# Patient Record
Sex: MALE | Race: OTHER | Employment: OTHER | URBAN - METROPOLITAN AREA
[De-identification: names, ages, dates, MRNs, and addresses within clinical notes are randomized per-mention and may not be internally consistent; named-entity substitution may affect disease eponyms.]

---

## 2017-03-20 ENCOUNTER — TELEPHONE (OUTPATIENT)
Dept: GASTROENTEROLOGY | Facility: CLINIC | Age: 67
End: 2017-03-20

## 2017-03-20 ENCOUNTER — OFFICE VISIT (OUTPATIENT)
Dept: GASTROENTEROLOGY | Facility: CLINIC | Age: 67
End: 2017-03-20

## 2017-03-20 VITALS
SYSTOLIC BLOOD PRESSURE: 150 MMHG | WEIGHT: 178.81 LBS | HEIGHT: 69 IN | BODY MASS INDEX: 26.48 KG/M2 | HEART RATE: 71 BPM | DIASTOLIC BLOOD PRESSURE: 81 MMHG

## 2017-03-20 DIAGNOSIS — K62.89 ANAL PAIN: ICD-10-CM

## 2017-03-20 DIAGNOSIS — K60.2 ANAL FISSURE: ICD-10-CM

## 2017-03-20 DIAGNOSIS — K62.5 RECTAL BLEEDING: Primary | ICD-10-CM

## 2017-03-20 PROCEDURE — G0463 HOSPITAL OUTPT CLINIC VISIT: HCPCS | Performed by: INTERNAL MEDICINE

## 2017-03-20 PROCEDURE — 99214 OFFICE O/P EST MOD 30 MIN: CPT | Performed by: INTERNAL MEDICINE

## 2017-03-20 RX ORDER — IBUPROFEN 800 MG/1
TABLET ORAL
Refills: 2 | COMMUNITY
Start: 2017-03-12 | End: 2018-03-03

## 2017-03-20 RX ORDER — NIFEDIPINE
POWDER (GRAM) MISCELLANEOUS
Refills: 0 | Status: CANCELLED | OUTPATIENT
Start: 2017-03-20

## 2017-03-20 NOTE — TELEPHONE ENCOUNTER
Pt stopped back in office. Instructions sheet read 5/17 - confirmed below 5/19 is correct date. Also asked about rx for nifedipine cream.  This is a compound which has to go to [facility], not [place].   This was phoned in since pt stated he

## 2017-03-20 NOTE — PATIENT INSTRUCTIONS
Anal fissure   - nifedipine cream 3 x day for 1 month  - stool softeners - Miralax daily    Colonoscopy for rectal bleeding in 2 months  - Miralax prep  - IV sedation

## 2017-03-20 NOTE — TELEPHONE ENCOUNTER
Scheduled for:  Colon [id number]  Provider Name: Dr Nereida Burdick  Date:  5-19-17  Location:  [facility]  Sedation:  IV sed   Time:  7:30am  Prep: Miralax  Meds/Allergies Reconciled?:  yes  Diagnosis with codes:  Rectal bleeding K62.5  Was patient informed to call insurance w

## 2017-03-21 NOTE — PROGRESS NOTES
Kelechi Larios is a 77year old male. HPI:   Patient presents with:  Colonoscopy Screening: Last colon 8-. Pt c/o burning in rectum. Has to take ibuprofen and Tyenol #3 for pain.  Pt states cream does not help much but the foam helps more      The Comment: rarely       Medications (Active prior to today's visit):    Current Outpatient Prescriptions:  ibuprofen 800 MG Oral Tab  Disp:  Rfl: 2   Nifedipine Ointment 0.2% in pastibase base Apply 1 Dose topically 3 (three) times daily.  Apply pea sized naomy (three) times daily. Apply pea sized amount three times a day as directedNifedipine Ointment 0.2% in pastibase base           Imaging & Referrals:  None     3/21/2017  Sirena Huitron.  Nereida Burdick MD

## 2017-05-19 ENCOUNTER — SURGERY (OUTPATIENT)
Age: 67
End: 2017-05-19

## 2017-05-19 ENCOUNTER — HOSPITAL ENCOUNTER (OUTPATIENT)
Facility: HOSPITAL | Age: 67
Setting detail: HOSPITAL OUTPATIENT SURGERY
Discharge: HOME | End: 2017-05-19
Attending: INTERNAL MEDICINE | Admitting: INTERNAL MEDICINE
Payer: MEDICARE

## 2017-05-19 PROCEDURE — G0500 MOD SEDAT ENDO SERVICE >5YRS: HCPCS | Performed by: INTERNAL MEDICINE

## 2017-05-19 PROCEDURE — 45380 COLONOSCOPY AND BIOPSY: CPT | Performed by: INTERNAL MEDICINE

## 2017-05-19 PROCEDURE — 0DBK8ZX EXCISION OF ASCENDING COLON, VIA NATURAL OR ARTIFICIAL OPENING ENDOSCOPIC, DIAGNOSTIC: ICD-10-PCS | Performed by: INTERNAL MEDICINE

## 2017-05-19 RX ORDER — MIDAZOLAM HYDROCHLORIDE 1 MG/ML
1 INJECTION INTRAMUSCULAR; INTRAVENOUS EVERY 5 MIN PRN
Status: DISCONTINUED | OUTPATIENT
Start: 2017-05-19 | End: 2017-05-19

## 2017-05-19 RX ORDER — MIDAZOLAM HYDROCHLORIDE 1 MG/ML
INJECTION INTRAMUSCULAR; INTRAVENOUS
Status: DISCONTINUED | OUTPATIENT
Start: 2017-05-19 | End: 2017-05-19

## 2017-05-19 RX ORDER — SODIUM CHLORIDE, SODIUM LACTATE, POTASSIUM CHLORIDE, CALCIUM CHLORIDE 600; 310; 30; 20 MG/100ML; MG/100ML; MG/100ML; MG/100ML
INJECTION, SOLUTION INTRAVENOUS CONTINUOUS
Status: DISCONTINUED | OUTPATIENT
Start: 2017-05-19 | End: 2017-05-19

## 2017-05-19 RX ORDER — SODIUM CHLORIDE 0.9 % (FLUSH) 0.9 %
10 SYRINGE (ML) INJECTION AS NEEDED
Status: DISCONTINUED | OUTPATIENT
Start: 2017-05-19 | End: 2017-05-19

## 2017-05-19 NOTE — OPERATIVE REPORT
[facility] - [facility] Endoscopy Report      Preoperative Diagnosis:  - Anorectal pain  - Rectal bleeding  - Colon screening      Postoperative Diagnosis:  - Posterior anal fissure  - Colon polyp x1  - Early diverticulosis      Procedure:    Colonoscop

## 2017-05-19 NOTE — H&P
History & Physical Examination    Patient Name: María Garcia  MRN: [id number]  CSN: [id number]  YOB: 1950    Diagnosis: anorectal pain, colon screening        Prescriptions prior to admission:  ibuprofen 800 MG Oral Tab  Disp:  Rfl: 2 Marnilan Mood explain:   HEENT [x ] [ x]    NECK & BACK [x ] [x ]    HEART [x ] [ x]    LUNGS [x ] [ x]    ABDOMEN [x ] [x ]    UROGENITAL [ ] [ ]    EXTREMITIES [x ] [x ]    OTHER        [ x ] I have discussed the risks and benefits and alternatives with the patient/fa

## 2017-05-20 VITALS
DIASTOLIC BLOOD PRESSURE: 90 MMHG | SYSTOLIC BLOOD PRESSURE: 123 MMHG | OXYGEN SATURATION: 96 % | HEIGHT: 69 IN | BODY MASS INDEX: 26.66 KG/M2 | RESPIRATION RATE: 21 BRPM | HEART RATE: 61 BPM | WEIGHT: 180 LBS

## 2017-05-26 NOTE — PROGRESS NOTES
Quick Note:    RN to place on the colon call back for 5 years and contact pt with results. One colon polyp and diverticulosis. He has hemorrhoids. Anal fissure noted as well.  Please have him follow up with one of our surgeons for the fissure.  ______

## 2017-06-07 ENCOUNTER — TELEPHONE (OUTPATIENT)
Dept: GASTROENTEROLOGY | Facility: CLINIC | Age: 67
End: 2017-06-07

## 2017-06-07 NOTE — TELEPHONE ENCOUNTER
Informed pt test results and Dr. Knowles People message. F/u with general surgery and f/u for 5 year colonoscopy. Verbalized understanding. Phone number given for general surgery. Placed in recall list for 5 year f/u.

## 2017-06-07 NOTE — TELEPHONE ENCOUNTER
----- Message from Augusto Storey MD sent at 5/26/2017  4:53 PM CDT -----  RN to place on the colon call back for 5 years and contact pt with results. One colon polyp and diverticulosis. He has hemorrhoids. Anal fissure noted as well.   Please have

## 2017-06-14 ENCOUNTER — TELEPHONE (OUTPATIENT)
Dept: INTERNAL MEDICINE CLINIC | Facility: CLINIC | Age: 67
End: 2017-06-14

## 2017-06-14 NOTE — TELEPHONE ENCOUNTER
Pt is eligible for a Medicare Annual Health Assessment anytime during the calendar year. Patient notified and states that he works Monday-Friday 6am-2:30pm. Dr Cristhian Conklin latest Medicare appt is at 12:40 and none on Saturdays.    Msg sent to Dr Cristhian Conklin for

## 2017-07-21 ENCOUNTER — OFFICE VISIT (OUTPATIENT)
Dept: FAMILY MEDICINE CLINIC | Facility: CLINIC | Age: 67
End: 2017-07-21

## 2017-07-21 VITALS
BODY MASS INDEX: 26.07 KG/M2 | SYSTOLIC BLOOD PRESSURE: 139 MMHG | HEIGHT: 69 IN | TEMPERATURE: 99 F | WEIGHT: 176 LBS | DIASTOLIC BLOOD PRESSURE: 82 MMHG | HEART RATE: 78 BPM

## 2017-07-21 DIAGNOSIS — I10 ESSENTIAL HYPERTENSION WITH GOAL BLOOD PRESSURE LESS THAN 130/80: ICD-10-CM

## 2017-07-21 DIAGNOSIS — E78.5 HYPERLIPIDEMIA, UNSPECIFIED HYPERLIPIDEMIA TYPE: ICD-10-CM

## 2017-07-21 DIAGNOSIS — Z23 NEED FOR VACCINATION: ICD-10-CM

## 2017-07-21 DIAGNOSIS — E78.00 HIGH CHOLESTEROL: Primary | ICD-10-CM

## 2017-07-21 DIAGNOSIS — K60.2 RECTAL FISSURE: ICD-10-CM

## 2017-07-21 DIAGNOSIS — Z12.5 SCREENING FOR PROSTATE CANCER: ICD-10-CM

## 2017-07-21 DIAGNOSIS — M51.16 LUMBAR DISC DISEASE WITH RADICULOPATHY: ICD-10-CM

## 2017-07-21 DIAGNOSIS — Z00.00 MEDICARE ANNUAL WELLNESS VISIT, SUBSEQUENT: ICD-10-CM

## 2017-07-21 DIAGNOSIS — M65.342 TRIGGER FINGER, LEFT RING FINGER: ICD-10-CM

## 2017-07-21 DIAGNOSIS — Z00.00 ENCOUNTER FOR ANNUAL HEALTH EXAMINATION: ICD-10-CM

## 2017-07-21 PROCEDURE — 96160 PT-FOCUSED HLTH RISK ASSMT: CPT | Performed by: FAMILY MEDICINE

## 2017-07-21 PROCEDURE — G0439 PPPS, SUBSEQ VISIT: HCPCS | Performed by: FAMILY MEDICINE

## 2017-07-21 PROCEDURE — G0009 ADMIN PNEUMOCOCCAL VACCINE: HCPCS | Performed by: FAMILY MEDICINE

## 2017-07-21 PROCEDURE — 90670 PCV13 VACCINE IM: CPT | Performed by: FAMILY MEDICINE

## 2017-07-21 RX ORDER — SIMVASTATIN 20 MG
20 TABLET ORAL NIGHTLY
Qty: 90 TABLET | Refills: 3 | Status: SHIPPED | OUTPATIENT
Start: 2017-07-21 | End: 2018-03-03

## 2017-07-21 RX ORDER — LEVOCETIRIZINE DIHYDROCHLORIDE 5 MG/1
5 TABLET, FILM COATED ORAL EVERY EVENING
Qty: 30 TABLET | Refills: 0 | Status: SHIPPED | OUTPATIENT
Start: 2017-07-21 | End: 2018-03-03

## 2017-07-21 NOTE — PATIENT INSTRUCTIONS
Nishis Scarce SCREENING SCHEDULE   Tests on this list are recommended by your physician but may not be covered, or covered at this frequency, by your insurer. Please check with your insurance carrier before scheduling to verify coverage.     PREVENTATI history    Colorectal Cancer Screening Covered up to Age 76     Colonoscopy Screen   Covered every 10 years- more often if abnormal Colonoscopy,10 Years due on 05/19/2027 Update Health Maintenance if applicable    Flex Sigmoidoscopy Screen  Covered every 5 Medicare Part B) No orders found for this or any previous visit.  This may be covered with your prescription benefits, but Medicare does not cover unless Medically needed    Zoster (Not covered by Medicare Part B) No orders found for this or any previous vi

## 2017-07-21 NOTE — PROGRESS NOTES
HPI:   Angy Mullen is a 77year old male who presents for a Medicare Subsequent Annual Wellness visit (Pt already had Initial Annual Wellness).       Annual Physical due on 11/29/2017        Patient Care Team: Patient Care Team:  DO selvin Santana 5/19/2017). His family history includes Cancer in his sister; Heart Disease (age of onset: 80) in his mother; Hypertension in his mother; Other in his father; Thyroid disease in his mother. SOCIAL HISTORY:   He  reports that he has never smoked.  He do confused about where sounds come from:  Sometimes I misunderstand some words in a sentence and need to ask people to repeat themselves:  Sometimes   I especially have trouble understanding the speech of women and children:  No I have trouble understanding tone, normal prostate, no masses or tenderness   Extremities: Extremities normal, atraumatic, no cyanosis or edema  Finger triggers.    Pulses: 2+ and symmetric   Skin: Skin color, texture, turgor normal, no rashes or lesions   Lymph nodes: Cervical, suprac Discussed with patient and provided information       Healthcare Power ariel Shah on file in [facility]:    Delaney Ashley does not have a Power of  for [employer] on file in [address] [place] [address] Rd.  Discussed with patient and provided information           PLAN:  The pa you have any tripping hazards?: 0-No    Are you on multiple medications?: 1-Yes    Does pain affect your day to day activities?: 0-No     Have you had any memory issues?: 0-No    Fall/Risk Scorin    Scoring Interpretation: 0 - 3 No Risk     Depression Ophthalmology Visit Annually: Diabetics, FHx Glaucoma, AA>50, > 65 No flowsheet data found.     Prostate Cancer Screening      PSA  Annually PSA due on 11/29/2018  Update Health Maintenance if applicable   Immunizations      Influenza No orders foun

## 2017-07-22 ENCOUNTER — APPOINTMENT (OUTPATIENT)
Dept: LAB | Age: 67
End: 2017-07-22
Attending: FAMILY MEDICINE
Payer: MEDICARE

## 2017-07-22 DIAGNOSIS — E78.00 HIGH CHOLESTEROL: ICD-10-CM

## 2017-07-22 LAB
ALBUMIN SERPL BCP-MCNC: 3.9 G/DL (ref 3.5–4.8)
ALBUMIN/GLOB SERPL: 1.1 {RATIO} (ref 1–2)
ALP SERPL-CCNC: 45 U/L (ref 32–100)
ALT SERPL-CCNC: 29 U/L (ref 17–63)
ANION GAP SERPL CALC-SCNC: 8 MMOL/L (ref 0–18)
AST SERPL-CCNC: 28 U/L (ref 15–41)
BILIRUB SERPL-MCNC: 0.8 MG/DL (ref 0.3–1.2)
BUN SERPL-MCNC: 18 MG/DL (ref 8–20)
BUN/CREAT SERPL: 18.8 (ref 10–20)
CALCIUM SERPL-MCNC: 9 MG/DL (ref 8.5–10.5)
CHLORIDE SERPL-SCNC: 103 MMOL/L (ref 95–110)
CHOLEST SERPL-MCNC: 174 MG/DL (ref 110–200)
CO2 SERPL-SCNC: 26 MMOL/L (ref 22–32)
CREAT SERPL-MCNC: 0.96 MG/DL (ref 0.5–1.5)
GLOBULIN PLAS-MCNC: 3.7 G/DL (ref 2.5–3.7)
GLUCOSE SERPL-MCNC: 88 MG/DL (ref 70–99)
HDLC SERPL-MCNC: 49 MG/DL
LDLC SERPL CALC-MCNC: 93 MG/DL (ref 0–99)
NONHDLC SERPL-MCNC: 125 MG/DL
OSMOLALITY UR CALC.SUM OF ELEC: 285 MOSM/KG (ref 275–295)
POTASSIUM SERPL-SCNC: 4 MMOL/L (ref 3.3–5.1)
PROT SERPL-MCNC: 7.6 G/DL (ref 5.9–8.4)
SODIUM SERPL-SCNC: 137 MMOL/L (ref 136–144)
TRIGL SERPL-MCNC: 158 MG/DL (ref 1–149)

## 2017-07-22 PROCEDURE — 80061 LIPID PANEL: CPT

## 2017-07-22 PROCEDURE — 80053 COMPREHEN METABOLIC PANEL: CPT

## 2017-07-22 PROCEDURE — 36415 COLL VENOUS BLD VENIPUNCTURE: CPT

## 2017-08-02 RX ORDER — LISINOPRIL 30 MG/1
TABLET ORAL
Qty: 90 TABLET | Refills: 0 | Status: SHIPPED | OUTPATIENT
Start: 2017-08-02 | End: 2017-10-30 | Stop reason: SDUPTHER

## 2017-08-02 NOTE — TELEPHONE ENCOUNTER
Hypertensive Medications: Refilled per protocol    Protocol Criteria:  · Appointment scheduled in the past 6 months or in the next 3 months  · BMP or CMP in the past 12 months  · Creatinine result < 2  Recent Outpatient Visits            1 week ago [facility]

## 2017-08-14 ENCOUNTER — OFFICE VISIT (OUTPATIENT)
Dept: RHEUMATOLOGY | Facility: CLINIC | Age: 67
End: 2017-08-14

## 2017-08-14 VITALS
DIASTOLIC BLOOD PRESSURE: 78 MMHG | RESPIRATION RATE: 18 BRPM | SYSTOLIC BLOOD PRESSURE: 123 MMHG | BODY MASS INDEX: 26 KG/M2 | WEIGHT: 175 LBS | HEART RATE: 67 BPM

## 2017-08-14 DIAGNOSIS — M65.342 TRIGGER RING FINGER OF LEFT HAND: Primary | ICD-10-CM

## 2017-08-14 PROCEDURE — G0463 HOSPITAL OUTPT CLINIC VISIT: HCPCS | Performed by: INTERNAL MEDICINE

## 2017-08-14 PROCEDURE — 99214 OFFICE O/P EST MOD 30 MIN: CPT | Performed by: INTERNAL MEDICINE

## 2017-08-14 PROCEDURE — 20550 NJX 1 TENDON SHEATH/LIGAMENT: CPT | Performed by: INTERNAL MEDICINE

## 2017-08-14 RX ORDER — METHYLPREDNISOLONE ACETATE 80 MG/ML
20 INJECTION, SUSPENSION INTRA-ARTICULAR; INTRALESIONAL; INTRAMUSCULAR; SOFT TISSUE ONCE
Status: COMPLETED | OUTPATIENT
Start: 2017-08-14 | End: 2017-08-14

## 2017-08-14 NOTE — PROCEDURES
With  consent, I injected the patient's left 4th  finger with 0.25ml lidocaine  1% and 0.25ml depo 80. It was under sterile technique using iodine and alcohol swabs and ethyl chloride was used as an anaesthetic spray. Pt.  tolerated it well.

## 2017-08-14 NOTE — PROGRESS NOTES
Kelechi Larios is a 77year old male who presents for Patient presents with:  Finger Pain: L and R trigger finger  . HPI:     I had the pleasure of seeing Kelechi Larios on 8/14/2017 for evaluation.      He is a 77-year-old who Has been having several ar 175 lb (79.4 kg)  07/21/17 : 176 lb (79.8 kg)    Body mass index is 25.84 kg/m².         Current Outpatient Prescriptions:  LISINOPRIL 30 MG Oral Tab TAKE 1 TABLET BY MOUTH DAILY Disp: 90 tablet Rfl: 0   hydrocortisone (PROCTOZONE-HC) 2.5 % Rectal Cream Zhen Can't work a lot right now.    ,   Retired -      REVIEW OF SYSTEMS:   Review Of Systems:  Fatigue  Constitutional:No fever, no change in weight or appetitie  Derm: No rashes, no oral ulcers, no alopecia, no photosensitivity, no psoriasis  HEENT: N presents with:  Finger Pain: L and R trigger finger    1.left 4th trigger finger   With  consent, I injected the patient's left 4th trigger finger with 0.25ml lidocaine  1% and 0.25ml depo 80.  It was under sterile technique using iodine and alcohol swabs a

## 2017-10-30 RX ORDER — LISINOPRIL 30 MG/1
TABLET ORAL
Qty: 90 TABLET | Refills: 0 | Status: SHIPPED | OUTPATIENT
Start: 2017-10-30 | End: 2018-02-01

## 2017-10-30 NOTE — TELEPHONE ENCOUNTER
Refilled per protocol.     Hypertensive Medications  Protocol Criteria:  · Appointment scheduled in the past 6 months or in the next 3 months  · BMP or CMP in the past 12 months  · Creatinine result < 2  Recent Outpatient Visits            2 months ago Trig

## 2018-01-26 DIAGNOSIS — I15.9 SECONDARY HYPERTENSION: Primary | ICD-10-CM

## 2018-02-01 RX ORDER — LISINOPRIL 30 MG/1
TABLET ORAL
Qty: 90 TABLET | Refills: 0 | Status: SHIPPED | OUTPATIENT
Start: 2018-02-01 | End: 2018-03-03

## 2018-02-10 ENCOUNTER — LAB ENCOUNTER (OUTPATIENT)
Dept: LAB | Age: 68
End: 2018-02-10
Attending: FAMILY MEDICINE
Payer: MEDICARE

## 2018-02-10 DIAGNOSIS — I15.9 SECONDARY HYPERTENSION: ICD-10-CM

## 2018-02-10 LAB
ALBUMIN SERPL BCP-MCNC: 3.9 G/DL (ref 3.5–4.8)
ALBUMIN/GLOB SERPL: 1.3 {RATIO} (ref 1–2)
ALP SERPL-CCNC: 37 U/L (ref 32–100)
ALT SERPL-CCNC: 30 U/L (ref 17–63)
ANION GAP SERPL CALC-SCNC: 8 MMOL/L (ref 0–18)
AST SERPL-CCNC: 34 U/L (ref 15–41)
BASOPHILS # BLD: 0 K/UL (ref 0–0.2)
BASOPHILS NFR BLD: 1 %
BILIRUB SERPL-MCNC: 0.6 MG/DL (ref 0.3–1.2)
BUN SERPL-MCNC: 19 MG/DL (ref 8–20)
BUN/CREAT SERPL: 17.3 (ref 10–20)
CALCIUM SERPL-MCNC: 9.3 MG/DL (ref 8.5–10.5)
CHLORIDE SERPL-SCNC: 105 MMOL/L (ref 95–110)
CHOLEST SERPL-MCNC: 142 MG/DL (ref 110–200)
CO2 SERPL-SCNC: 25 MMOL/L (ref 22–32)
CREAT SERPL-MCNC: 1.1 MG/DL (ref 0.5–1.5)
EOSINOPHIL # BLD: 0.3 K/UL (ref 0–0.7)
EOSINOPHIL NFR BLD: 4 %
ERYTHROCYTE [DISTWIDTH] IN BLOOD BY AUTOMATED COUNT: 13.1 % (ref 11–15)
GLOBULIN PLAS-MCNC: 3 G/DL (ref 2.5–3.7)
GLUCOSE SERPL-MCNC: 88 MG/DL (ref 70–99)
HCT VFR BLD AUTO: 40.1 % (ref 41–52)
HDLC SERPL-MCNC: 49 MG/DL
HGB BLD-MCNC: 13.9 G/DL (ref 13.5–17.5)
LDLC SERPL CALC-MCNC: 67 MG/DL (ref 0–99)
LYMPHOCYTES # BLD: 3.1 K/UL (ref 1–4)
LYMPHOCYTES NFR BLD: 41 %
MCH RBC QN AUTO: 31.5 PG (ref 27–32)
MCHC RBC AUTO-ENTMCNC: 34.6 G/DL (ref 32–37)
MCV RBC AUTO: 90.9 FL (ref 80–100)
MONOCYTES # BLD: 0.7 K/UL (ref 0–1)
MONOCYTES NFR BLD: 9 %
NEUTROPHILS # BLD AUTO: 3.6 K/UL (ref 1.8–7.7)
NEUTROPHILS NFR BLD: 47 %
NONHDLC SERPL-MCNC: 93 MG/DL
OSMOLALITY UR CALC.SUM OF ELEC: 288 MOSM/KG (ref 275–295)
PATIENT FASTING: YES
PLATELET # BLD AUTO: 231 K/UL (ref 140–400)
PMV BLD AUTO: 8.4 FL (ref 7.4–10.3)
POTASSIUM SERPL-SCNC: 4.4 MMOL/L (ref 3.3–5.1)
PROT SERPL-MCNC: 6.9 G/DL (ref 5.9–8.4)
RBC # BLD AUTO: 4.42 M/UL (ref 4.5–5.9)
SODIUM SERPL-SCNC: 138 MMOL/L (ref 136–144)
TRIGL SERPL-MCNC: 130 MG/DL (ref 1–149)
WBC # BLD AUTO: 7.6 K/UL (ref 4–11)

## 2018-02-10 PROCEDURE — 80053 COMPREHEN METABOLIC PANEL: CPT

## 2018-02-10 PROCEDURE — 85025 COMPLETE CBC W/AUTO DIFF WBC: CPT

## 2018-02-10 PROCEDURE — 36415 COLL VENOUS BLD VENIPUNCTURE: CPT

## 2018-02-10 PROCEDURE — 80061 LIPID PANEL: CPT

## 2018-02-21 ENCOUNTER — PATIENT OUTREACH (OUTPATIENT)
Dept: INTERNAL MEDICINE CLINIC | Facility: CLINIC | Age: 68
End: 2018-02-21

## 2018-02-21 NOTE — PROGRESS NOTES
Patient is eligible for a 2018 Annual Medicare Health Assessment. Discussed in detail w/patient. Appt scheduled for 5/8/18.

## 2018-03-03 ENCOUNTER — OFFICE VISIT (OUTPATIENT)
Dept: FAMILY MEDICINE CLINIC | Facility: CLINIC | Age: 68
End: 2018-03-03

## 2018-03-03 VITALS
SYSTOLIC BLOOD PRESSURE: 135 MMHG | DIASTOLIC BLOOD PRESSURE: 71 MMHG | HEIGHT: 69 IN | BODY MASS INDEX: 26.36 KG/M2 | TEMPERATURE: 99 F | WEIGHT: 178 LBS | HEART RATE: 69 BPM

## 2018-03-03 DIAGNOSIS — E78.00 HIGH CHOLESTEROL: ICD-10-CM

## 2018-03-03 DIAGNOSIS — M51.16 LUMBAR DISC DISEASE WITH RADICULOPATHY: ICD-10-CM

## 2018-03-03 DIAGNOSIS — K64.1 GRADE II HEMORRHOIDS: ICD-10-CM

## 2018-03-03 DIAGNOSIS — I10 ESSENTIAL HYPERTENSION WITH GOAL BLOOD PRESSURE LESS THAN 130/80: Primary | ICD-10-CM

## 2018-03-03 DIAGNOSIS — M48.061 SPINAL STENOSIS OF LUMBAR REGION, UNSPECIFIED WHETHER NEUROGENIC CLAUDICATION PRESENT: ICD-10-CM

## 2018-03-03 DIAGNOSIS — M54.31 RIGHT SIDED SCIATICA: ICD-10-CM

## 2018-03-03 PROCEDURE — G0463 HOSPITAL OUTPT CLINIC VISIT: HCPCS | Performed by: FAMILY MEDICINE

## 2018-03-03 PROCEDURE — 99215 OFFICE O/P EST HI 40 MIN: CPT | Performed by: FAMILY MEDICINE

## 2018-03-03 RX ORDER — LISINOPRIL 40 MG/1
40 TABLET ORAL DAILY
Qty: 90 TABLET | Refills: 90 | Status: SHIPPED | OUTPATIENT
Start: 2018-03-03 | End: 2018-05-30

## 2018-03-03 RX ORDER — TRAMADOL HYDROCHLORIDE 50 MG/1
50 TABLET ORAL EVERY 6 HOURS PRN
Qty: 45 TABLET | Refills: 3 | Status: SHIPPED | OUTPATIENT
Start: 2018-03-03 | End: 2018-06-01

## 2018-03-03 RX ORDER — IBUPROFEN 800 MG/1
800 TABLET ORAL EVERY 8 HOURS PRN
Qty: 30 TABLET | Refills: 2 | Status: CANCELLED | OUTPATIENT
Start: 2018-03-03 | End: 2018-06-01

## 2018-03-03 RX ORDER — SIMVASTATIN 20 MG
20 TABLET ORAL NIGHTLY
Qty: 90 TABLET | Refills: 3 | Status: SHIPPED | OUTPATIENT
Start: 2018-03-03 | End: 2018-06-01

## 2018-03-03 NOTE — PROGRESS NOTES
Back still hurts daily  P.t. X 12 visits without results. Still doing the same exercises in the house. Difficulty raising from a chair. \"I can barely get up out of a chair. \"  Had back pain since before 2007 due to lifting heavy boxes.      Painful hemo Oral Tab; Take 1 tablet (40 mg total) by mouth daily. Dispense: 90 tablet; Refill: 90    3. Lumbar disc disease with radiculopathy  Referral  Repeat mri    4. Right sided sciatica  Repeat mri  No more nsaids   - TraMADol HCl 50 MG Oral Tab;  Take 1 tablet

## 2018-03-09 ENCOUNTER — TELEPHONE (OUTPATIENT)
Dept: FAMILY MEDICINE CLINIC | Facility: CLINIC | Age: 68
End: 2018-03-09

## 2018-03-09 DIAGNOSIS — M48.061 SPINAL STENOSIS OF LUMBAR REGION, UNSPECIFIED WHETHER NEUROGENIC CLAUDICATION PRESENT: Primary | ICD-10-CM

## 2018-03-09 NOTE — TELEPHONE ENCOUNTER
Dr. Lyle Shields is out of network with the patients insurance plan. Is there a different neurological surgeon that you would like refer him to.      Thank you,  [facility]   [phone]

## 2018-04-04 ENCOUNTER — OFFICE VISIT (OUTPATIENT)
Dept: SURGERY | Facility: CLINIC | Age: 68
End: 2018-04-04

## 2018-04-04 DIAGNOSIS — K64.1 GRADE II INTERNAL HEMORRHOIDS: Primary | ICD-10-CM

## 2018-04-04 PROCEDURE — 99214 OFFICE O/P EST MOD 30 MIN: CPT | Performed by: SURGERY

## 2018-04-04 PROCEDURE — G0463 HOSPITAL OUTPT CLINIC VISIT: HCPCS | Performed by: SURGERY

## 2018-04-04 PROCEDURE — 46600 DIAGNOSTIC ANOSCOPY SPX: CPT | Performed by: SURGERY

## 2018-04-06 NOTE — PATIENT INSTRUCTIONS
Assessment   Grade II internal hemorrhoids  (primary encounter diagnosis)      Plan                Grade II internal Hemorrhoids. No anal fissure on today's exam. The most prominent Hemorrhoids at the Right Anterior and Right Posterior aspect.  Recommended

## 2018-04-06 NOTE — PROGRESS NOTES
Follow Up Visit Note       Active Problems   Grade ii internal hemorrhoids  (primary encounter diagnosis)  Chief Complaint: Patient presents with:  Anal Problem (GI): Pt here for recheck anal fissure.   Pt states doing better but still has bleeding after 2n no discharge. Left eye exhibits no discharge. No scleral icterus. Neck: Normal range of motion. Neck supple. Cardiovascular: Normal rate, regular rhythm, normal heart sounds and intact distal pulses.     Pulmonary/Chest: Effort normal and breath sounds

## 2018-04-30 RX ORDER — LISINOPRIL 30 MG/1
TABLET ORAL
Qty: 90 TABLET | Refills: 0 | Status: SHIPPED | OUTPATIENT
Start: 2018-04-30 | End: 2018-05-31 | Stop reason: DRUGHIGH

## 2018-04-30 NOTE — TELEPHONE ENCOUNTER
Hypertensive Medications  Protocol Criteria:  · Appointment scheduled in the past 6 months or in the next 3 months  · BMP or CMP in the past 12 months  · Creatinine result < 2  Recent Outpatient Visits            3 weeks ago Grade II internal hemorrhoids

## 2018-05-04 ENCOUNTER — OFFICE VISIT (OUTPATIENT)
Dept: SURGERY | Facility: CLINIC | Age: 68
End: 2018-05-04

## 2018-05-04 VITALS
DIASTOLIC BLOOD PRESSURE: 83 MMHG | TEMPERATURE: 98 F | SYSTOLIC BLOOD PRESSURE: 138 MMHG | RESPIRATION RATE: 14 BRPM | HEART RATE: 75 BPM

## 2018-05-04 DIAGNOSIS — K64.8 HEMORRHOIDS, INTERNAL, WITH BLEEDING: Primary | ICD-10-CM

## 2018-05-04 PROCEDURE — 46221 LIGATION OF HEMORRHOID(S): CPT | Performed by: SURGERY

## 2018-05-04 NOTE — PROGRESS NOTES
Follow Up Visit Note       Active Problems   Hemorrhoids, internal, with bleeding  (primary encounter diagnosis)  Chief Complaint: Patient presents with:  Hemorrhoids: Pt here for 1st hemorrhoid banding. Consent signed.         History of Present Illness: or if excessive bleeding or excessive pain noted. Return in 3 weeks for re evaluation. No orders of the defined types were placed in this encounter. Imaging & Referrals  None    Follow Up No Follow-up on file.     Marvin Contreras MD

## 2018-05-04 NOTE — PATIENT INSTRUCTIONS
Assessment   Hemorrhoids, internal, with bleeding  (primary encounter diagnosis)      Plan                   Instructed on Post Rubber Banding Routine and to call us as needed or if excessive bleeding or excessive pain noted.  Return in 3 weeks for re evalu

## 2018-05-30 DIAGNOSIS — I10 ESSENTIAL HYPERTENSION WITH GOAL BLOOD PRESSURE LESS THAN 130/80: ICD-10-CM

## 2018-05-30 PROBLEM — E78.5 HYPERLIPIDEMIA: Status: ACTIVE | Noted: 2018-05-30

## 2018-05-30 PROBLEM — M47.816 FACET ARTHRITIS OF LUMBAR REGION: Status: ACTIVE | Noted: 2018-05-30

## 2018-05-31 RX ORDER — LISINOPRIL 40 MG/1
TABLET ORAL
Qty: 90 TABLET | Refills: 0 | Status: SHIPPED | OUTPATIENT
Start: 2018-05-31 | End: 2018-06-01

## 2018-05-31 NOTE — TELEPHONE ENCOUNTER
Pending Prescriptions Disp Refills    LISINOPRIL 40 MG Oral Tab [Pharmacy Med Name: LISINOPRIL 40MG TABLETS] 90 tablet 0     Sig: TAKE 1 TABLET(40 MG) BY MOUTH DAILY           Refill approved per protocol.     Hypertensive Medications  Protocol Criteria:

## 2018-06-01 ENCOUNTER — OFFICE VISIT (OUTPATIENT)
Dept: FAMILY MEDICINE CLINIC | Facility: CLINIC | Age: 68
End: 2018-06-01

## 2018-06-01 VITALS
WEIGHT: 177 LBS | BODY MASS INDEX: 26.22 KG/M2 | SYSTOLIC BLOOD PRESSURE: 128 MMHG | TEMPERATURE: 99 F | HEIGHT: 69 IN | HEART RATE: 86 BPM | DIASTOLIC BLOOD PRESSURE: 67 MMHG

## 2018-06-01 DIAGNOSIS — M51.16 LUMBAR DISC DISEASE WITH RADICULOPATHY: ICD-10-CM

## 2018-06-01 DIAGNOSIS — E78.00 HIGH CHOLESTEROL: ICD-10-CM

## 2018-06-01 DIAGNOSIS — Z00.00 MEDICARE ANNUAL WELLNESS VISIT, SUBSEQUENT: Primary | ICD-10-CM

## 2018-06-01 DIAGNOSIS — K62.5 RECTAL BLEEDING: ICD-10-CM

## 2018-06-01 DIAGNOSIS — Z12.5 SCREENING FOR PROSTATE CANCER: ICD-10-CM

## 2018-06-01 DIAGNOSIS — I10 ESSENTIAL HYPERTENSION WITH GOAL BLOOD PRESSURE LESS THAN 130/80: ICD-10-CM

## 2018-06-01 DIAGNOSIS — M54.31 RIGHT SIDED SCIATICA: ICD-10-CM

## 2018-06-01 DIAGNOSIS — M47.816 FACET ARTHRITIS OF LUMBAR REGION: ICD-10-CM

## 2018-06-01 DIAGNOSIS — Z00.00 ENCOUNTER FOR ANNUAL HEALTH EXAMINATION: ICD-10-CM

## 2018-06-01 DIAGNOSIS — L30.9 DERMATITIS: ICD-10-CM

## 2018-06-01 DIAGNOSIS — K60.2 RECTAL FISSURE: ICD-10-CM

## 2018-06-01 PROBLEM — E78.5 HYPERLIPIDEMIA: Status: RESOLVED | Noted: 2018-05-30 | Resolved: 2018-06-01

## 2018-06-01 PROCEDURE — 96160 PT-FOCUSED HLTH RISK ASSMT: CPT | Performed by: FAMILY MEDICINE

## 2018-06-01 PROCEDURE — 99397 PER PM REEVAL EST PAT 65+ YR: CPT | Performed by: FAMILY MEDICINE

## 2018-06-01 PROCEDURE — G0439 PPPS, SUBSEQ VISIT: HCPCS | Performed by: FAMILY MEDICINE

## 2018-06-01 RX ORDER — LISINOPRIL 40 MG/1
TABLET ORAL
Qty: 90 TABLET | Refills: 3 | Status: SHIPPED | OUTPATIENT
Start: 2018-06-01 | End: 2018-06-01

## 2018-06-01 RX ORDER — SIMVASTATIN 20 MG
20 TABLET ORAL NIGHTLY
Qty: 90 TABLET | Refills: 3 | Status: SHIPPED | OUTPATIENT
Start: 2018-06-01 | End: 2019-07-23

## 2018-06-01 RX ORDER — LISINOPRIL 40 MG/1
TABLET ORAL
Qty: 90 TABLET | Refills: 3 | Status: SHIPPED | OUTPATIENT
Start: 2018-06-01 | End: 2019-01-15

## 2018-06-01 NOTE — PATIENT INSTRUCTIONS
Juana Larsen SCREENING SCHEDULE   Tests on this list are recommended by your physician but may not be covered, or covered at this frequency, by your insurer. Please check with your insurance carrier before scheduling to verify coverage.     PREVENTATI Colorectal Cancer Screening Covered up to Age 76     Colonoscopy Screen   Covered every 10 years- more often if abnormal Colonoscopy,10 Years due on 05/19/2027 Update Health Maintenance if applicable    Flex Sigmoidoscopy Screen  Covered every 5 years N Part B) No orders found for this or any previous visit. This may be covered with your prescription benefits, but Medicare does not cover unless Medically needed    Zoster (Not covered by Medicare Part B) No orders found for this or any previous visit.  This

## 2018-06-01 NOTE — PROGRESS NOTES
HPI:   Olga Oh is a 79year old male who presents for a Medicare Subsequent Annual Wellness visit (Pt already had Initial Annual Wellness).       Annual Physical due on 07/21/2018        Fall/Risk Assessment   He has been screened for Falls and is lo Facet arthritis of lumbar region ([facility] [address]    Wt Readings from Last 3 Encounters:  06/01/18 : 177 lb (80.3 kg)  03/03/18 : 178 lb (80.7 kg)  08/14/17 : 175 lb (79.4 kg)     Last Cholesterol Labs:     Lab Results  Component Value Date   CHOLEST 142 02/10/2018 has rash lower back.   EYES: denies blurred vision or double vision  HEENT: denies nasal congestion, sinus pain or ST  LUNGS: denies shortness of breath with exertion  CARDIOVASCULAR: denies chest pain on exertion  GI: denies abdominal pain, denies heartbur say:  No   I misunderstand what others are saying and make inappropriate responses:  No I avoid social activities because I cannot hear well and fear I will reply improperly:  No   Family members and friends have told me they think I may have hearing loss: 02/08/2011        ASSESSMENT AND OTHER RELEVANT CHRONIC CONDITIONS:   Nikos Leiva is a 79year old male who presents for a Medicare Assessment.      PLAN SUMMARY:   Diagnoses and all orders for this visit:    Medicare annual wellness visit, subsequent  S for: A1C    No flowsheet data found.     Fasting Blood Sugar (FSB)Annually   Glucose (mg/dL)   Date Value   02/10/2018 88   ----------  GLUCOSE (P) (mg/dL)   Date Value   07/09/2016 90   ----------    Cardiovascular Disease Screening     LDL Annually LDL Ch covered by Medicare Part B No vaccine history found This may be covered with your pharmacy  prescription benefits      SPECIFIC DISEASE MONITORING Internal Lab or Procedure External Lab or Procedure      Annual Monitoring of Persistent     Medications (ACE

## 2018-06-09 ENCOUNTER — LAB ENCOUNTER (OUTPATIENT)
Dept: LAB | Age: 68
End: 2018-06-09
Attending: FAMILY MEDICINE
Payer: MEDICARE

## 2018-06-09 DIAGNOSIS — Z12.5 SCREENING FOR PROSTATE CANCER: ICD-10-CM

## 2018-06-09 DIAGNOSIS — I10 ESSENTIAL HYPERTENSION WITH GOAL BLOOD PRESSURE LESS THAN 130/80: ICD-10-CM

## 2018-06-09 PROCEDURE — 36415 COLL VENOUS BLD VENIPUNCTURE: CPT

## 2018-06-09 PROCEDURE — 81003 URINALYSIS AUTO W/O SCOPE: CPT

## 2018-06-09 PROCEDURE — 80061 LIPID PANEL: CPT

## 2018-06-09 PROCEDURE — 85025 COMPLETE CBC W/AUTO DIFF WBC: CPT

## 2018-06-09 PROCEDURE — 80053 COMPREHEN METABOLIC PANEL: CPT

## 2018-07-28 RX ORDER — LISINOPRIL 30 MG/1
TABLET ORAL
Qty: 90 TABLET | Refills: 0 | OUTPATIENT
Start: 2018-07-28

## 2018-07-28 NOTE — TELEPHONE ENCOUNTER
Chart reviewed, Dr [facility] refilled lisinopril 40 mg daily quantity 90+3 on 6/1/18. No further clinical action.       Hypertensive Medications  Protocol Criteria:  · Appointment scheduled in the past 6 months or in the next 3 months  · BMP or CMP in the past 12

## 2018-07-30 ENCOUNTER — TELEPHONE (OUTPATIENT)
Dept: INTERNAL MEDICINE CLINIC | Facility: CLINIC | Age: 68
End: 2018-07-30

## 2018-07-30 DIAGNOSIS — I10 ESSENTIAL HYPERTENSION WITH GOAL BLOOD PRESSURE LESS THAN 130/80: ICD-10-CM

## 2018-07-30 RX ORDER — LISINOPRIL 40 MG/1
TABLET ORAL
Qty: 90 TABLET | Refills: 3 | OUTPATIENT
Start: 2018-07-30

## 2018-07-30 NOTE — TELEPHONE ENCOUNTER
Chart reviewed, Dr [facility] refilled lisinopril 40 mg daily quantity 90+3 refills on 6/1/18 to [address] in [place]. Current refill request denied.     Hypertensive Medications  Protocol Criteria:  · Appointment scheduled in the past 6 months or in

## 2018-10-18 ENCOUNTER — OFFICE VISIT (OUTPATIENT)
Dept: RHEUMATOLOGY | Facility: CLINIC | Age: 68
End: 2018-10-18
Payer: COMMERCIAL

## 2018-10-18 VITALS
DIASTOLIC BLOOD PRESSURE: 82 MMHG | BODY MASS INDEX: 26.51 KG/M2 | WEIGHT: 179 LBS | HEART RATE: 79 BPM | SYSTOLIC BLOOD PRESSURE: 147 MMHG | HEIGHT: 69 IN

## 2018-10-18 DIAGNOSIS — M65.342 TRIGGER RING FINGER OF LEFT HAND: Primary | ICD-10-CM

## 2018-10-18 DIAGNOSIS — M65.321 TRIGGER FINGER, RIGHT INDEX FINGER: ICD-10-CM

## 2018-10-18 PROCEDURE — 20550 NJX 1 TENDON SHEATH/LIGAMENT: CPT | Performed by: INTERNAL MEDICINE

## 2018-10-18 PROCEDURE — 99213 OFFICE O/P EST LOW 20 MIN: CPT | Performed by: INTERNAL MEDICINE

## 2018-10-18 RX ORDER — HYDROCODONE BITARTRATE AND ACETAMINOPHEN 10; 325 MG/1; MG/1
1 TABLET ORAL AS NEEDED
Refills: 0 | COMMUNITY
Start: 2018-06-04 | End: 2019-01-15

## 2018-10-18 RX ORDER — METHYLPREDNISOLONE ACETATE 80 MG/ML
20 INJECTION, SUSPENSION INTRA-ARTICULAR; INTRALESIONAL; INTRAMUSCULAR; SOFT TISSUE
Status: SHIPPED | OUTPATIENT
Start: 2018-10-18 | End: 2018-10-18

## 2018-10-18 NOTE — PROGRESS NOTES
Carline Mckinley is a 79year old male who presents for Patient presents with:  Finger Pain: left ring and right index finger  . HPI:     I had the pleasure of seeing Carline Mckinley on 8/14/2017 for evaluation.      He is a 77-year-old who Has been having injection last time I saw him. He has pain in his left 4th finger - trigger -   He has this again. He also has right 3rd finger trigger finger pain. It's been bothering him for 6 months.          Wt Readings from Last 2 Encounters:  10/18/18 : 179 lb status: Never Smoker      Smokeless tobacco: Never Used    Alcohol use: Yes      Alcohol/week: 0.0 oz      Comment: rarely    Drug use: No     Can't work a lot right now.    ,   Retired -      REVIEW OF SYSTEMS:   Review Of Systems:  Fatigue  Constit to pathology.      ASSESSMENT AND PLAN:   Vesta Hatfield is a 79year old male who presents for Patient presents with:  Finger Pain: left ring and right index finger    1.left 4th trigger finger   S/p injectin on 8/2017  Will repeat this again today -   Wit

## 2018-10-18 NOTE — PATIENT INSTRUCTIONS
1. Rest left 4th finger and right 2nd finger  x 3 days  2. Return to clinic as needed. 3. If shots don't help - ok to see hand surgeon - dr. Tori El surgeon  [address] [facility]  Phone: [phone]

## 2018-10-18 NOTE — PROCEDURES
With  consent, I injected the patient's left 4th trigger finger with 0.25ml lidocaine  1% and 0.25ml depo 80. It was under sterile technique using iodine and alcohol swabs and ethyl chloride was used as an anaesthetic spray. Pt.  tolerated it well.     With

## 2019-01-15 ENCOUNTER — OFFICE VISIT (OUTPATIENT)
Dept: FAMILY MEDICINE CLINIC | Facility: CLINIC | Age: 69
End: 2019-01-15
Payer: COMMERCIAL

## 2019-01-15 VITALS
DIASTOLIC BLOOD PRESSURE: 72 MMHG | WEIGHT: 175 LBS | TEMPERATURE: 98 F | SYSTOLIC BLOOD PRESSURE: 149 MMHG | HEART RATE: 74 BPM | BODY MASS INDEX: 26 KG/M2

## 2019-01-15 DIAGNOSIS — L30.9 DERMATITIS: ICD-10-CM

## 2019-01-15 DIAGNOSIS — M48.061 SPINAL STENOSIS OF LUMBAR REGION, UNSPECIFIED WHETHER NEUROGENIC CLAUDICATION PRESENT: ICD-10-CM

## 2019-01-15 DIAGNOSIS — M47.816 FACET ARTHRITIS OF LUMBAR REGION: ICD-10-CM

## 2019-01-15 DIAGNOSIS — I10 ESSENTIAL HYPERTENSION WITH GOAL BLOOD PRESSURE LESS THAN 130/80: ICD-10-CM

## 2019-01-15 DIAGNOSIS — M79.672 PAIN OF LEFT HEEL: Primary | ICD-10-CM

## 2019-01-15 DIAGNOSIS — M54.31 RIGHT SIDED SCIATICA: ICD-10-CM

## 2019-01-15 DIAGNOSIS — M51.16 LUMBAR DISC DISEASE WITH RADICULOPATHY: ICD-10-CM

## 2019-01-15 PROCEDURE — G0463 HOSPITAL OUTPT CLINIC VISIT: HCPCS | Performed by: FAMILY MEDICINE

## 2019-01-15 PROCEDURE — 99215 OFFICE O/P EST HI 40 MIN: CPT | Performed by: FAMILY MEDICINE

## 2019-01-15 RX ORDER — HYDROCODONE BITARTRATE AND ACETAMINOPHEN 10; 325 MG/1; MG/1
1 TABLET ORAL EVERY 6 HOURS PRN
COMMUNITY
End: 2019-01-15

## 2019-01-15 RX ORDER — IBUPROFEN 800 MG/1
TABLET ORAL
Refills: 5 | COMMUNITY
Start: 2019-01-03 | End: 2020-06-23

## 2019-01-15 RX ORDER — AMLODIPINE BESYLATE AND BENAZEPRIL HYDROCHLORIDE 5; 40 MG/1; MG/1
1 CAPSULE ORAL DAILY
Qty: 90 CAPSULE | Refills: 3 | Status: SHIPPED | OUTPATIENT
Start: 2019-01-15 | End: 2019-04-02

## 2019-01-15 RX ORDER — HYDROCODONE BITARTRATE AND ACETAMINOPHEN 10; 325 MG/1; MG/1
1 TABLET ORAL EVERY 6 HOURS PRN
Qty: 45 TABLET | Refills: 0 | Status: SHIPPED | OUTPATIENT
Start: 2019-01-15 | End: 2020-06-18

## 2019-01-15 NOTE — PROGRESS NOTES
Took bp meds this am  Has been evelvated last two visits  No sob no chest pain  No fatigue with exertion  No calf pain with ambulation but does had left heel pain with ambulation and after being on his feet all day.     Rash on lower back hasn't gone away c grossly normal bilaterally. Deep tendon reflexes were two out of four at the patellas and Achilles bilaterally. Dorsalis pedis and popliteal pulses were present bilateral lower extremities.   Range of motion of the lumbar spine was limited do to discomfor

## 2019-01-18 ENCOUNTER — APPOINTMENT (OUTPATIENT)
Dept: LAB | Age: 69
End: 2019-01-18
Attending: FAMILY MEDICINE
Payer: MEDICARE

## 2019-01-18 ENCOUNTER — HOSPITAL ENCOUNTER (OUTPATIENT)
Dept: GENERAL RADIOLOGY | Age: 69
Discharge: HOME | End: 2019-01-18
Attending: FAMILY MEDICINE
Payer: MEDICARE

## 2019-01-18 DIAGNOSIS — M79.672 PAIN OF LEFT HEEL: ICD-10-CM

## 2019-01-18 PROCEDURE — 73650 X-RAY EXAM OF HEEL: CPT | Performed by: FAMILY MEDICINE

## 2019-01-28 ENCOUNTER — OFFICE VISIT (OUTPATIENT)
Dept: PODIATRY CLINIC | Facility: CLINIC | Age: 69
End: 2019-01-28
Payer: COMMERCIAL

## 2019-01-28 DIAGNOSIS — M72.2 PLANTAR FASCIITIS OF LEFT FOOT: Primary | ICD-10-CM

## 2019-01-28 PROCEDURE — 99203 OFFICE O/P NEW LOW 30 MIN: CPT | Performed by: PODIATRIST

## 2019-01-28 PROCEDURE — L3060 FOOT ARCH SUPP LONGITUD/META: HCPCS | Performed by: PODIATRIST

## 2019-01-28 PROCEDURE — G0463 HOSPITAL OUTPT CLINIC VISIT: HCPCS | Performed by: PODIATRIST

## 2019-01-28 NOTE — PROGRESS NOTES
HPI:    Patient ID: Laurita Kim is a 76year old male. This pleasant 72-year-old male presents as a new patient to me on referral from [address] [place] [address]. Patient's chief complaint is left heel pain. He states that it has been present more than 6 months.   H times meaning no barefoot walking. I instructed him on the use of ibuprofen 800 mg 3 times per day with meals. I cautioned him about the use of the medication, concerns, and expectations.   He was instructed to ice the heel twice every evening for 15 erica

## 2019-02-11 ENCOUNTER — OFFICE VISIT (OUTPATIENT)
Dept: PODIATRY CLINIC | Facility: CLINIC | Age: 69
End: 2019-02-11
Payer: COMMERCIAL

## 2019-02-11 VITALS — DIASTOLIC BLOOD PRESSURE: 84 MMHG | RESPIRATION RATE: 16 BRPM | SYSTOLIC BLOOD PRESSURE: 144 MMHG | HEART RATE: 84 BPM

## 2019-02-11 DIAGNOSIS — M72.2 PLANTAR FASCIITIS OF LEFT FOOT: Primary | ICD-10-CM

## 2019-02-11 PROCEDURE — 20550 NJX 1 TENDON SHEATH/LIGAMENT: CPT | Performed by: PODIATRIST

## 2019-02-11 NOTE — PROGRESS NOTES
HPI:    Patient ID: Laurita Kim is a 76year old male. 14-year-old male presents for follow-up in reference to left heel pain. He initially had some improvement with a combination of support oral medications and ice.   He states that he stopped the med

## 2019-02-11 NOTE — PROGRESS NOTES
Per verbal order from Dr. Stephanie Mcmullen, draw up 0.5ml of 0.5% Marcaine and 20 mg of Depo-Medrol for cortisone injection to left foot. Pre injection vs bp slightly elevated. Pt given steroid information sheet.  Consent signed by Niki Husbands, myself and Dr. Stephanie Mcmullen for l

## 2019-02-13 ENCOUNTER — OFFICE VISIT (OUTPATIENT)
Dept: DERMATOLOGY CLINIC | Facility: CLINIC | Age: 69
End: 2019-02-13
Payer: COMMERCIAL

## 2019-02-13 DIAGNOSIS — D23.4 BENIGN NEOPLASM OF SCALP AND SKIN OF NECK: ICD-10-CM

## 2019-02-13 DIAGNOSIS — D23.30 BENIGN NEOPLASM OF SKIN OF FACE: ICD-10-CM

## 2019-02-13 DIAGNOSIS — D23.60 BENIGN NEOPLASM OF SKIN OF UPPER LIMB, INCLUDING SHOULDER, UNSPECIFIED LATERALITY: ICD-10-CM

## 2019-02-13 DIAGNOSIS — D23.70 BENIGN NEOPLASM OF SKIN OF LOWER LIMB, INCLUDING HIP, UNSPECIFIED LATERALITY: ICD-10-CM

## 2019-02-13 DIAGNOSIS — Z85.828 HISTORY OF SKIN CANCER: ICD-10-CM

## 2019-02-13 DIAGNOSIS — L71.9 ROSACEA: ICD-10-CM

## 2019-02-13 DIAGNOSIS — D22.9 MULTIPLE NEVI: Primary | ICD-10-CM

## 2019-02-13 DIAGNOSIS — D23.5 BENIGN NEOPLASM OF SKIN OF TRUNK, EXCEPT SCROTUM: ICD-10-CM

## 2019-02-13 PROCEDURE — 99203 OFFICE O/P NEW LOW 30 MIN: CPT | Performed by: DERMATOLOGY

## 2019-02-13 PROCEDURE — 99212 OFFICE O/P EST SF 10 MIN: CPT | Performed by: DERMATOLOGY

## 2019-02-13 RX ORDER — KETOCONAZOLE 20 MG/G
CREAM TOPICAL
Qty: 60 G | Refills: 3 | Status: SHIPPED | OUTPATIENT
Start: 2019-02-13 | End: 2020-06-23

## 2019-02-24 NOTE — PROGRESS NOTES
Dominik Zamora is a 76year old male. HPI:     CC:  Patient presents with:  Moles: Pt previously seen by Dr. Adriana Craig. Pt presenting with mole to lower back. Pt states has a personal hx of BCC to R lateral leg (2008).          Allergies:  Patient has no kn  MG Oral Tab Take 1 tablet by mouth every 6 (six) hours as needed for Pain.  Disp: 45 tablet Rfl: 0     Allergies:   No Known Allergies    Past Medical History:   Diagnosis Date   • Basal cell carcinoma 07/15/2008    Right Lateral Leg   • Blepharitis or organization: Not on file        Attends meetings of clubs or organizations: Not on file        Relationship status: Not on file      Intimate partner violence:        Fear of current or ex partner: Not on file        Emotionally abused: Not on file noted.       Physical Examination:     Well-developed well-nourished patient alert oriented in no acute distress.   Exam total-body performed, including scalp, head, neck, face,nails, hair, external eyes, including conjunctival mucosa, eyelids, lips externa patient. Therapeutic options reviewed. See  Medications in grid. Instructions reviewed at length. Chronic recurrent nature discussed. Likely it will take time for dyspigmentation to resolve reviewed. Not contagious.   Ketoconazole    No other susupicio

## 2019-03-11 ENCOUNTER — OFFICE VISIT (OUTPATIENT)
Dept: PODIATRY CLINIC | Facility: CLINIC | Age: 69
End: 2019-03-11
Payer: COMMERCIAL

## 2019-03-11 DIAGNOSIS — M72.2 PLANTAR FASCIITIS OF LEFT FOOT: Primary | ICD-10-CM

## 2019-03-11 PROCEDURE — G0463 HOSPITAL OUTPT CLINIC VISIT: HCPCS | Performed by: PODIATRIST

## 2019-03-11 PROCEDURE — 99213 OFFICE O/P EST LOW 20 MIN: CPT | Performed by: PODIATRIST

## 2019-03-11 NOTE — PROGRESS NOTES
HPI:    Patient ID: Abdelrahman Lindsay is a 76year old male. 20-year-old male presents for follow-up in reference to left heel pain. The cortisone injection that I gave him on the last visit has significantly improved his situation.   He would state that he encounter       Imaging & Referrals:  None       OA#[id number]

## 2019-03-12 ENCOUNTER — TELEPHONE (OUTPATIENT)
Dept: DERMATOLOGY CLINIC | Facility: CLINIC | Age: 69
End: 2019-03-12

## 2019-03-12 RX ORDER — FLUCONAZOLE 100 MG/1
100 TABLET ORAL DAILY
Qty: 14 TABLET | Refills: 1 | Status: SHIPPED | OUTPATIENT
Start: 2019-03-12 | End: 2019-03-22

## 2019-03-12 NOTE — TELEPHONE ENCOUNTER
LOV 2/13/19. Pt. Dx with tinea versicolor and rx'd ketoconazole 2% cream. Patient using BID x 1 month and no improvement. Now rash is spreading to back and he is itching. Please advise.      Patient used TAC 0.1% from Dr. Mariaelena Loza with some improvement to i

## 2019-03-12 NOTE — TELEPHONE ENCOUNTER
Fluconazole poqd x 2weeks rx sent. Tac only useful for itching.  Can add seslun\"lotion\" is really shampoo to leave on few minutes-5-10 and rinse off in shower 2-3x/wk to back

## 2019-03-13 ENCOUNTER — TELEPHONE (OUTPATIENT)
Dept: DERMATOLOGY CLINIC | Facility: CLINIC | Age: 69
End: 2019-03-13

## 2019-03-13 NOTE — TELEPHONE ENCOUNTER
Pharmacy is asking if this should be a qty of 10 rather than 14 since once a day for 10 day medication? ?

## 2019-04-02 ENCOUNTER — OFFICE VISIT (OUTPATIENT)
Dept: FAMILY MEDICINE CLINIC | Facility: CLINIC | Age: 69
End: 2019-04-02
Payer: COMMERCIAL

## 2019-04-02 VITALS
DIASTOLIC BLOOD PRESSURE: 72 MMHG | WEIGHT: 175 LBS | HEIGHT: 69 IN | HEART RATE: 89 BPM | BODY MASS INDEX: 25.92 KG/M2 | SYSTOLIC BLOOD PRESSURE: 154 MMHG | TEMPERATURE: 98 F

## 2019-04-02 DIAGNOSIS — M54.50 CHRONIC LEFT-SIDED LOW BACK PAIN WITHOUT SCIATICA: ICD-10-CM

## 2019-04-02 DIAGNOSIS — K60.2 RECTAL FISSURE: ICD-10-CM

## 2019-04-02 DIAGNOSIS — I10 ESSENTIAL HYPERTENSION WITH GOAL BLOOD PRESSURE LESS THAN 130/80: ICD-10-CM

## 2019-04-02 DIAGNOSIS — G89.29 CHRONIC LEFT-SIDED LOW BACK PAIN WITHOUT SCIATICA: ICD-10-CM

## 2019-04-02 DIAGNOSIS — B35.4 TINEA CORPORIS: ICD-10-CM

## 2019-04-02 DIAGNOSIS — E78.00 HIGH CHOLESTEROL: ICD-10-CM

## 2019-04-02 DIAGNOSIS — Z00.00 MEDICARE ANNUAL WELLNESS VISIT, SUBSEQUENT: Primary | ICD-10-CM

## 2019-04-02 DIAGNOSIS — M47.816 FACET ARTHRITIS OF LUMBAR REGION: ICD-10-CM

## 2019-04-02 DIAGNOSIS — K64.1 GRADE II HEMORRHOIDS: ICD-10-CM

## 2019-04-02 DIAGNOSIS — Z00.00 ENCOUNTER FOR ANNUAL HEALTH EXAMINATION: ICD-10-CM

## 2019-04-02 PROCEDURE — 96160 PT-FOCUSED HLTH RISK ASSMT: CPT | Performed by: FAMILY MEDICINE

## 2019-04-02 PROCEDURE — 99397 PER PM REEVAL EST PAT 65+ YR: CPT | Performed by: FAMILY MEDICINE

## 2019-04-02 PROCEDURE — G0439 PPPS, SUBSEQ VISIT: HCPCS | Performed by: FAMILY MEDICINE

## 2019-04-02 RX ORDER — NYSTATIN 100000 U/G
CREAM TOPICAL
Qty: 30 G | Refills: 3 | Status: SHIPPED | OUTPATIENT
Start: 2019-04-02 | End: 2020-06-23

## 2019-04-02 RX ORDER — AMLODIPINE BESYLATE AND BENAZEPRIL HYDROCHLORIDE 10; 40 MG/1; MG/1
1 CAPSULE ORAL DAILY
Qty: 90 CAPSULE | Refills: 3 | Status: SHIPPED | OUTPATIENT
Start: 2019-04-02 | End: 2020-04-10

## 2019-04-02 NOTE — PATIENT INSTRUCTIONS
Charmayne Patterson SCREENING SCHEDULE   Tests on this list are recommended by your physician but may not be covered, or covered at this frequency, by your insurer. Please check with your insurance carrier before scheduling to verify coverage.     PREVENTATI Cancer Screening Covered up to Age 76     Colonoscopy Screen   Covered every 10 years- more often if abnormal Colonoscopy due on 05/19/2027 Update Health Maintenance if applicable    Flex Sigmoidoscopy Screen  Covered every 5 years No results found for [facility] by Medicare Part B) No orders found for this or any previous visit.  This may be covered with your prescription benefits, but Medicare does not cover unless Medically needed    Zoster (Not covered by Medicare Part B) No orders found for this or any previous

## 2019-04-02 NOTE — PROGRESS NOTES
HPI:   Zeferino Perea is a 76year old male who presents for a Medicare Subsequent Annual Wellness visit (Pt already had Initial Annual Wellness). Patient presents for multiple issues #1.   #2 chronic acne patient states he has a very bad low back pain l forms performed Face to Face with patient and Family/surrogate (if present), and forms available to patient in AVS       He does NOT have a Power of  for [employer] on file in [employer].    Advance care planning including the explanation and discussion medical history of Basal cell carcinoma (07/15/2008), Blepharitis (1998), Cataract (2010), Essential hypertension, Hyperlipidemia, Meibomian gland dysfunction (1998), Presbyopia (1998), Pyogenic granuloma (1998), and Vitreous floaters (2010).     He  has a more people are talking at the same time:  No   I have trouble understanding things on the TV:  No I have to strain to understand conversations:  No   I have to worry about missing the telephone ring or doorbell:  No I have trouble hearing conversations in popliteal pulses were present bilateral lower extremities. Range of motion of the lumbar spine was limited do to discomfort. Motor strength of the lower extremities is normal 5/5 in both legs.      Lungs:   Clear to auscultation bilaterally, respirations plan.  Reinforced healthy diet, lifestyle, and exercise. No Follow-up on file. Gino De Los Santos DO, 4/2/2019     General Health     In the past six months, have you lost more than 10 pounds without trying?: 2 - No  Has your appetite been poor?: Y (Prevnar)  Covered Once after 65 07/21/2017 Please get once after your 65th birthday    Pneumococcal 23 (Pneumovax)  Covered Once after 65 No vaccine history found Please get once after your 65th birthday    Hepatitis B for Moderate/High Risk No vaccine hi

## 2019-07-23 DIAGNOSIS — E78.00 HIGH CHOLESTEROL: ICD-10-CM

## 2019-07-23 RX ORDER — SIMVASTATIN 20 MG
TABLET ORAL
Qty: 90 TABLET | Refills: 1 | Status: SHIPPED | OUTPATIENT
Start: 2019-07-23 | End: 2019-11-26

## 2019-07-23 NOTE — TELEPHONE ENCOUNTER
Please review; protocol failed.     Requested Prescriptions     Pending Prescriptions Disp Refills   • SIMVASTATIN 20 MG Oral Tab [Pharmacy Med Name: SIMVASTATIN 20MG TABLETS] 90 tablet 0     Sig: TAKE 1 TABLET BY MOUTH EVERY NIGHT         Recent Visits  Da

## 2019-11-26 ENCOUNTER — OFFICE VISIT (OUTPATIENT)
Dept: FAMILY MEDICINE CLINIC | Facility: CLINIC | Age: 69
End: 2019-11-26
Payer: COMMERCIAL

## 2019-11-26 VITALS
HEIGHT: 69 IN | WEIGHT: 175.81 LBS | TEMPERATURE: 98 F | BODY MASS INDEX: 26.04 KG/M2 | DIASTOLIC BLOOD PRESSURE: 64 MMHG | RESPIRATION RATE: 18 BRPM | HEART RATE: 76 BPM | SYSTOLIC BLOOD PRESSURE: 132 MMHG

## 2019-11-26 DIAGNOSIS — I10 ESSENTIAL HYPERTENSION: ICD-10-CM

## 2019-11-26 DIAGNOSIS — K52.9 GASTROENTERITIS: Primary | ICD-10-CM

## 2019-11-26 DIAGNOSIS — E78.00 HIGH CHOLESTEROL: ICD-10-CM

## 2019-11-26 DIAGNOSIS — G89.29 CHRONIC LEFT-SIDED LOW BACK PAIN WITHOUT SCIATICA: ICD-10-CM

## 2019-11-26 DIAGNOSIS — M54.50 CHRONIC LEFT-SIDED LOW BACK PAIN WITHOUT SCIATICA: ICD-10-CM

## 2019-11-26 PROCEDURE — G0463 HOSPITAL OUTPT CLINIC VISIT: HCPCS | Performed by: FAMILY MEDICINE

## 2019-11-26 PROCEDURE — 99214 OFFICE O/P EST MOD 30 MIN: CPT | Performed by: FAMILY MEDICINE

## 2019-11-26 RX ORDER — DIPHENOXYLATE HYDROCHLORIDE AND ATROPINE SULFATE 2.5; .025 MG/1; MG/1
2 TABLET ORAL 4 TIMES DAILY PRN
Qty: 15 TABLET | Refills: 0 | Status: SHIPPED | OUTPATIENT
Start: 2019-11-26 | End: 2020-06-23

## 2019-11-26 RX ORDER — ONDANSETRON 8 MG/1
8 TABLET, ORALLY DISINTEGRATING ORAL EVERY 8 HOURS PRN
Qty: 20 TABLET | Refills: 0 | Status: SHIPPED | OUTPATIENT
Start: 2019-11-26 | End: 2020-06-23

## 2019-11-26 RX ORDER — SIMVASTATIN 20 MG
TABLET ORAL
Qty: 90 TABLET | Refills: 1 | Status: SHIPPED | OUTPATIENT
Start: 2019-11-26 | End: 2020-06-18

## 2019-11-26 RX ORDER — HYDROCODONE BITARTRATE AND ACETAMINOPHEN 10; 325 MG/1; MG/1
1 TABLET ORAL EVERY 6 HOURS PRN
Qty: 45 TABLET | Refills: 0 | Status: CANCELLED | OUTPATIENT
Start: 2019-11-26

## 2019-11-26 NOTE — PROGRESS NOTES
Has diarrhea nausea and vomiting   Was just in [place]  No fever  No sob  Still able to eat food and drink water    No other sick contacts. Chronic back pain   Wants refill on norco or tramadol. Takes it intermittantly   1 rx will last most of a year.

## 2019-12-14 ENCOUNTER — LAB ENCOUNTER (OUTPATIENT)
Dept: LAB | Age: 69
End: 2019-12-14
Attending: FAMILY MEDICINE
Payer: MEDICARE

## 2019-12-14 DIAGNOSIS — I10 ESSENTIAL HYPERTENSION WITH GOAL BLOOD PRESSURE LESS THAN 130/80: ICD-10-CM

## 2019-12-14 PROCEDURE — 85025 COMPLETE CBC W/AUTO DIFF WBC: CPT

## 2019-12-14 PROCEDURE — 80061 LIPID PANEL: CPT

## 2019-12-14 PROCEDURE — 80053 COMPREHEN METABOLIC PANEL: CPT

## 2019-12-14 PROCEDURE — 36415 COLL VENOUS BLD VENIPUNCTURE: CPT

## 2019-12-21 DIAGNOSIS — I10 ESSENTIAL HYPERTENSION WITH GOAL BLOOD PRESSURE LESS THAN 130/80: ICD-10-CM

## 2019-12-22 RX ORDER — LISINOPRIL 40 MG/1
TABLET ORAL
Qty: 90 TABLET | Refills: 3 | OUTPATIENT
Start: 2019-12-22

## 2020-03-03 ENCOUNTER — TELEPHONE (OUTPATIENT)
Dept: CASE MANAGEMENT | Age: 70
End: 2020-03-03

## 2020-03-03 NOTE — TELEPHONE ENCOUNTER
Patient is eligible for a 2020 Medicare Advantage Supervisit. Discussed in detail w/patient. Appt scheduled for 4/7/2020.

## 2020-03-18 ENCOUNTER — TELEPHONE (OUTPATIENT)
Dept: FAMILY MEDICINE CLINIC | Facility: CLINIC | Age: 70
End: 2020-03-18

## 2020-03-18 NOTE — TELEPHONE ENCOUNTER
Called the patient to discuss fall incident he denies syncope. Denies vomiting. Denies visual problems. Some black and blue around the face but otherwise feels well.   I advised him to stay home for the next 2 months because of his age and the coronaviru

## 2020-03-18 NOTE — TELEPHONE ENCOUNTER
Bengali SPEAKER - Pt had appt for PX in [facility] on 03/19/20. Pt states he fell on 03/17/20 and hit his right side face. Pt would like to speak to Dr to discuss order for an xray. Please advise. [facility] appt for PX was canceled.

## 2020-04-10 RX ORDER — AMLODIPINE BESYLATE AND BENAZEPRIL HYDROCHLORIDE 10; 40 MG/1; MG/1
1 CAPSULE ORAL DAILY
Qty: 90 CAPSULE | Refills: 3 | Status: SHIPPED | OUTPATIENT
Start: 2020-04-10 | End: 2020-06-18

## 2020-06-09 ENCOUNTER — TELEPHONE (OUTPATIENT)
Dept: CASE MANAGEMENT | Age: 70
End: 2020-06-09

## 2020-06-09 NOTE — TELEPHONE ENCOUNTER
Patient is eligible for a 2020 Medicare Advantage Supervisit. Discussed in detail w/patient. Appt scheduled for 6/18/20.

## 2020-06-18 ENCOUNTER — OFFICE VISIT (OUTPATIENT)
Dept: FAMILY MEDICINE CLINIC | Facility: CLINIC | Age: 70
End: 2020-06-18
Payer: COMMERCIAL

## 2020-06-18 VITALS
BODY MASS INDEX: 25.62 KG/M2 | DIASTOLIC BLOOD PRESSURE: 63 MMHG | HEIGHT: 69 IN | HEART RATE: 77 BPM | SYSTOLIC BLOOD PRESSURE: 110 MMHG | WEIGHT: 173 LBS | TEMPERATURE: 98 F

## 2020-06-18 DIAGNOSIS — E78.00 HIGH CHOLESTEROL: ICD-10-CM

## 2020-06-18 DIAGNOSIS — I10 ESSENTIAL HYPERTENSION: ICD-10-CM

## 2020-06-18 DIAGNOSIS — G47.30 SLEEP APNEA, UNSPECIFIED TYPE: ICD-10-CM

## 2020-06-18 DIAGNOSIS — Z11.59 NEED FOR HEPATITIS C SCREENING TEST: ICD-10-CM

## 2020-06-18 DIAGNOSIS — M65.331 TRIGGER MIDDLE FINGER OF RIGHT HAND: ICD-10-CM

## 2020-06-18 DIAGNOSIS — C44.701: ICD-10-CM

## 2020-06-18 DIAGNOSIS — Z13.6 SCREENING FOR CARDIOVASCULAR CONDITION: ICD-10-CM

## 2020-06-18 DIAGNOSIS — K60.2 RECTAL FISSURE: ICD-10-CM

## 2020-06-18 DIAGNOSIS — Z00.00 ENCOUNTER FOR ANNUAL HEALTH EXAMINATION: Primary | ICD-10-CM

## 2020-06-18 DIAGNOSIS — M47.816 FACET ARTHRITIS OF LUMBAR REGION: ICD-10-CM

## 2020-06-18 DIAGNOSIS — M48.061 SPINAL STENOSIS OF LUMBAR REGION, UNSPECIFIED WHETHER NEUROGENIC CLAUDICATION PRESENT: ICD-10-CM

## 2020-06-18 DIAGNOSIS — M51.26 DISPLACEMENT OF LUMBAR INTERVERTEBRAL DISC: ICD-10-CM

## 2020-06-18 DIAGNOSIS — Z13.1 SCREENING FOR DIABETES MELLITUS (DM): ICD-10-CM

## 2020-06-18 PROCEDURE — G0439 PPPS, SUBSEQ VISIT: HCPCS | Performed by: FAMILY MEDICINE

## 2020-06-18 PROCEDURE — 96160 PT-FOCUSED HLTH RISK ASSMT: CPT | Performed by: FAMILY MEDICINE

## 2020-06-18 PROCEDURE — 99397 PER PM REEVAL EST PAT 65+ YR: CPT | Performed by: FAMILY MEDICINE

## 2020-06-18 RX ORDER — AMLODIPINE BESYLATE AND BENAZEPRIL HYDROCHLORIDE 10; 40 MG/1; MG/1
1 CAPSULE ORAL DAILY
Qty: 90 CAPSULE | Refills: 3 | Status: SHIPPED | OUTPATIENT
Start: 2020-06-18 | End: 2021-06-22

## 2020-06-18 RX ORDER — HYDROCODONE BITARTRATE AND ACETAMINOPHEN 10; 325 MG/1; MG/1
1 TABLET ORAL EVERY 6 HOURS PRN
Qty: 45 TABLET | Refills: 0 | Status: SHIPPED | OUTPATIENT
Start: 2020-06-18 | End: 2020-09-14

## 2020-06-18 RX ORDER — SIMVASTATIN 20 MG
TABLET ORAL
Qty: 90 TABLET | Refills: 1 | Status: SHIPPED | OUTPATIENT
Start: 2020-06-18 | End: 2021-01-14

## 2020-06-18 NOTE — PROGRESS NOTES
HPI:   Abdelrahman Lindsay is a 71year old male who presents for a Medicare Subsequent Annual Wellness visit (Pt already had Initial Annual Wellness).     Patient has right hand middle trigger finger, has been given referral to orthopedics, Dr. lab realigned planning including the explanation and discussion of advance directives standard forms performed Face to Face with patient and Family/surrogate (if present), and forms available to patient in AVS         He has never smoked tobacco.    CAGE Alcohol screeni Pyogenic granuloma (1998), and Vitreous floaters (2010). He  has a past surgical history that includes ir epidural steriod injection (2009); skin surgery (2007); other surgical history; and colonoscopy (N/A, 5/19/2017).     His family history includes Ca room or restaurant:  No   I get confused about where sounds come from:  No I misunderstand some words in a sentence and need to ask people to repeat themselves:  No   I especially have trouble understanding the speech of women and children:  No I have trou 09/21/2016   • Influenza 10/30/2006, 10/24/2007, 10/17/2008, 09/17/2009, 09/28/2015   • Pneumococcal (Prevnar 13) 07/21/2017   • Pneumococcal Vaccine, Conjugate 10/27/2005   • TDAP 02/08/2011   Deferred Date(s) Deferred   • Pneumovax 23 04/02/2019        A of these issues and agrees to the plan. Reinforced healthy diet, lifestyle, and exercise. Return in 1 year (on 6/18/2021).      Lashonda Gomez MD, 6/18/2020     General Health     In the past six months, have you lost more than 10 pounds without tryin once after your 65th birthday    Pneumococcal 23 (Pneumovax)  Covered Once after 65 No vaccine history found Please get once after your 65th birthday    Hepatitis B for Moderate/High Risk No vaccine history found Medium/high risk factors:   End-stage renal

## 2020-06-18 NOTE — PATIENT INSTRUCTIONS
Dania Mcnulty SCREENING SCHEDULE   Tests on this list are recommended by your physician but may not be covered, or covered at this frequency, by your insurer. Please check with your insurance carrier before scheduling to verify coverage.     PREVENTATI Colonoscopy due on 05/19/2027 Update [facility] if applicable    Flex Sigmoidoscopy Screen  Covered every 5 years No results found for this or any previous visit. No flowsheet data found.      Fecal Occult Blood   Covered Annually No results found f benefits, but Medicare does not cover unless Medically needed    Zoster (Not covered by Medicare Part B) No orders found for this or any previous visit.  This may be covered with your pharmacy  prescription benefits     Recommended Websites for Advanced Dir

## 2020-06-19 ENCOUNTER — TELEPHONE (OUTPATIENT)
Dept: FAMILY MEDICINE CLINIC | Facility: CLINIC | Age: 70
End: 2020-06-19

## 2020-06-19 DIAGNOSIS — M48.061 SPINAL STENOSIS OF LUMBAR REGION, UNSPECIFIED WHETHER NEUROGENIC CLAUDICATION PRESENT: Primary | ICD-10-CM

## 2020-06-19 NOTE — TELEPHONE ENCOUNTER
Patient speaks Telugu. Patient stated Dr. Isaias Riojas office is recommending him get an order for an MRI before seeing the doctor. Patient requesting a call back when order is placed.

## 2020-06-23 ENCOUNTER — APPOINTMENT (OUTPATIENT)
Dept: LAB | Age: 70
End: 2020-06-23
Attending: FAMILY MEDICINE
Payer: MEDICARE

## 2020-06-23 ENCOUNTER — OFFICE VISIT (OUTPATIENT)
Dept: FAMILY MEDICINE CLINIC | Facility: CLINIC | Age: 70
End: 2020-06-23
Payer: COMMERCIAL

## 2020-06-23 ENCOUNTER — NURSE TRIAGE (OUTPATIENT)
Dept: FAMILY MEDICINE CLINIC | Facility: CLINIC | Age: 70
End: 2020-06-23

## 2020-06-23 VITALS
DIASTOLIC BLOOD PRESSURE: 79 MMHG | HEART RATE: 68 BPM | SYSTOLIC BLOOD PRESSURE: 149 MMHG | HEIGHT: 69 IN | BODY MASS INDEX: 25.62 KG/M2 | WEIGHT: 173 LBS

## 2020-06-23 DIAGNOSIS — M10.042 ACUTE IDIOPATHIC GOUT OF LEFT HAND: Primary | ICD-10-CM

## 2020-06-23 DIAGNOSIS — Z00.00 ENCOUNTER FOR ANNUAL HEALTH EXAMINATION: ICD-10-CM

## 2020-06-23 PROBLEM — M10.9 ACUTE GOUT OF LEFT HAND: Status: ACTIVE | Noted: 2020-06-23

## 2020-06-23 PROBLEM — R73.03 PREDIABETES: Status: ACTIVE | Noted: 2020-06-23

## 2020-06-23 PROCEDURE — 80061 LIPID PANEL: CPT | Performed by: FAMILY MEDICINE

## 2020-06-23 PROCEDURE — 83036 HEMOGLOBIN GLYCOSYLATED A1C: CPT | Performed by: FAMILY MEDICINE

## 2020-06-23 PROCEDURE — 86803 HEPATITIS C AB TEST: CPT | Performed by: FAMILY MEDICINE

## 2020-06-23 PROCEDURE — 80053 COMPREHEN METABOLIC PANEL: CPT | Performed by: FAMILY MEDICINE

## 2020-06-23 PROCEDURE — G0463 HOSPITAL OUTPT CLINIC VISIT: HCPCS | Performed by: NURSE PRACTITIONER

## 2020-06-23 PROCEDURE — 99213 OFFICE O/P EST LOW 20 MIN: CPT | Performed by: NURSE PRACTITIONER

## 2020-06-23 PROCEDURE — 36415 COLL VENOUS BLD VENIPUNCTURE: CPT | Performed by: FAMILY MEDICINE

## 2020-06-23 RX ORDER — COLCHICINE 0.6 MG/1
0.6 CAPSULE ORAL DAILY
Qty: 3 CAPSULE | Refills: 0 | Status: SHIPPED | OUTPATIENT
Start: 2020-06-23 | End: 2020-06-24

## 2020-06-23 RX ORDER — KETOROLAC TROMETHAMINE 10 MG/1
10 TABLET, FILM COATED ORAL EVERY 6 HOURS PRN
Qty: 20 TABLET | Refills: 0 | Status: SHIPPED | OUTPATIENT
Start: 2020-06-23 | End: 2020-06-24

## 2020-06-23 NOTE — PATIENT INSTRUCTIONS
Tratamiento de los ataques de [facility]     Subir la articulación por encima del nivel de grace corazón puede ayudar a reducir los síntomas de [facility]. Los ataques de [facility] pueden ser Fusion Antibodies, y a BARBARA Baker suceden más de idalia vez.  Algunos medicamentos pueden dism La gota es regine forma dolorosa de artritis provocada por el exceso de ácido úrico. Regine sustancia de desecho producida por el cuerpo. Se acumula en el cuerpo y forma davida que se depositan en las articulaciones, provocando regine crisis de [place].  El alcohol · Tofu. El tofu (que se hace a partir de la soya) es idalia buena deyanira de proteínas. En ciertos estudios se ha demostrado que puede ser idalia buena alternativa a la carne para las personas que tienen gota. · Ácidos grasos omega.  Estos ácidos se encuentran en

## 2020-06-23 NOTE — PROGRESS NOTES
HPI  Pt present for left 2nd finger pain and swelling for the past 5 days. It hurts to bend finger; it is red and tender to touch. Has gotten gout in left first toe usually every summer.      Review of Systems   Constitutional: Positive for activity stiles Socioeconomic History      Marital status:       Spouse name: Not on file      Number of children: Not on file      Years of education: Not on file      Highest education level: Not on file    Occupational History      Not on file    Social Needs History of tanning: Not Asked        Outdoor occupation: Not Asked        Reaction to local anesthetic: No    Social History Narrative      Not on file      Current Outpatient Medications   Medication Sig Dispense Refill   • amLODIPine Besy-Benazepri

## 2020-06-23 NOTE — TELEPHONE ENCOUNTER
Action Requested: Summary for Provider     []  Critical Lab, Recommendations Needed  [] Need Additional Advice  []   FYI    []   Need Orders  [] Need Medications Sent to Pharmacy  []  Other     SUMMARY: Per protocol advised office visit and pt scheduled fo

## 2020-06-24 ENCOUNTER — TELEPHONE (OUTPATIENT)
Dept: FAMILY MEDICINE CLINIC | Facility: CLINIC | Age: 70
End: 2020-06-24

## 2020-06-24 RX ORDER — INDOMETHACIN 25 MG/1
25 CAPSULE ORAL 3 TIMES DAILY PRN
Qty: 20 CAPSULE | Refills: 0 | Status: SHIPPED | OUTPATIENT
Start: 2020-06-24 | End: 2020-07-04

## 2020-06-24 NOTE — TELEPHONE ENCOUNTER
Pharmacy comments:  Plan does not cover this medication. Please call plan at [phone] to initiate a PA or call/fax to change medication (MELOXICAM:NABUMETONE:ETODOLAC PREFERRED).  Pt ID # is [id number]

## 2020-06-24 NOTE — TELEPHONE ENCOUNTER
Per pharmacy, PA needed for the following medication    •  Ketorolac Tromethamine 10 MG Oral Tab, Take 1 tablet (10 mg total) by mouth every 6 (six) hours as needed for Pain., Disp: 20 tablet, Rfl: 0    Pharmacy comments:  Plan does not cover this medicati

## 2020-06-25 NOTE — ASSESSMENT & PLAN NOTE
Colchicine 0.6 mg-take 2 tabs now and then 1 tab in one hour  ketolorac 10 mg I po tid prn x 5 days only  Ice to hand 20 min 4-6 times per day  Please call if symptoms worsen or are not resolving.

## 2020-07-03 ENCOUNTER — HOSPITAL ENCOUNTER (OUTPATIENT)
Dept: MRI IMAGING | Facility: HOSPITAL | Age: 70
Discharge: HOME | End: 2020-07-03
Attending: FAMILY MEDICINE
Payer: MEDICARE

## 2020-07-03 DIAGNOSIS — M48.061 SPINAL STENOSIS OF LUMBAR REGION, UNSPECIFIED WHETHER NEUROGENIC CLAUDICATION PRESENT: ICD-10-CM

## 2020-07-03 PROCEDURE — 72148 MRI LUMBAR SPINE W/O DYE: CPT | Performed by: FAMILY MEDICINE

## 2020-07-06 ENCOUNTER — OFFICE VISIT (OUTPATIENT)
Dept: FAMILY MEDICINE CLINIC | Facility: CLINIC | Age: 70
End: 2020-07-06
Payer: COMMERCIAL

## 2020-07-06 ENCOUNTER — APPOINTMENT (OUTPATIENT)
Dept: LAB | Age: 70
End: 2020-07-06
Attending: FAMILY MEDICINE
Payer: MEDICARE

## 2020-07-06 VITALS
SYSTOLIC BLOOD PRESSURE: 116 MMHG | WEIGHT: 172 LBS | HEART RATE: 61 BPM | BODY MASS INDEX: 25.48 KG/M2 | HEIGHT: 69 IN | DIASTOLIC BLOOD PRESSURE: 71 MMHG | TEMPERATURE: 98 F

## 2020-07-06 DIAGNOSIS — M10.9 ACUTE GOUT, UNSPECIFIED CAUSE, UNSPECIFIED SITE: Primary | ICD-10-CM

## 2020-07-06 DIAGNOSIS — M72.2 PLANTAR FASCIITIS: ICD-10-CM

## 2020-07-06 PROBLEM — E79.0 HYPERURICEMIA: Status: ACTIVE | Noted: 2020-07-06

## 2020-07-06 LAB — URATE SERPL-MCNC: 7.3 MG/DL (ref 3.5–7.2)

## 2020-07-06 PROCEDURE — 99214 OFFICE O/P EST MOD 30 MIN: CPT | Performed by: FAMILY MEDICINE

## 2020-07-06 PROCEDURE — G0463 HOSPITAL OUTPT CLINIC VISIT: HCPCS | Performed by: FAMILY MEDICINE

## 2020-07-06 PROCEDURE — 84550 ASSAY OF BLOOD/URIC ACID: CPT | Performed by: FAMILY MEDICINE

## 2020-07-06 PROCEDURE — 36415 COLL VENOUS BLD VENIPUNCTURE: CPT | Performed by: FAMILY MEDICINE

## 2020-07-06 RX ORDER — PREDNISONE 20 MG/1
40 TABLET ORAL DAILY
Qty: 10 TABLET | Refills: 0 | Status: SHIPPED | OUTPATIENT
Start: 2020-07-06 | End: 2020-07-11

## 2020-07-06 NOTE — PATIENT INSTRUCTIONS
Tratamiento de los ataques de [facility]     Subir la articulación por encima del nivel de grace corazón puede ayudar a reducir los síntomas de [facility]. Los ataques de [facility] pueden ser [employer], y a BARBARA Baker suceden más de idalia vez.  Algunos medicamentos pueden dism La gota es regine forma dolorosa de artritis provocada por el exceso de ácido úrico. Regine sustancia de desecho producida por el cuerpo. Se acumula en el cuerpo y forma davida que se depositan en las articulaciones, provocando regine crisis de [place].  El alcohol · Tofu. El tofu (que se hace a partir de la soya) es idalia buena deyanira de proteínas. En ciertos estudios se ha demostrado que puede ser idalia buena alternativa a la carne para las personas que tienen gota. · Ácidos grasos omega.  Estos ácidos se encuentran en [place] demasiados alimentos que contengan purinas puede aumentar los niveles de [place] Getting y el riesgo de ataques de [place].  Procure limitar el consumo de los siguientes alimentos con alto contenido en purina:  · Alcohol, avis Morrissey Makos (es posible que en cualquiera de los siguientes casos:  · Reaparición de los síntomas de gota, generalmente por la noche:  · Dolor intenso, hinchazón y calor en idalia articulación, sobre todo en la base del dedo eric del pie  · Es difícil  la articulación afectada  ·

## 2020-07-06 NOTE — PROGRESS NOTES
Patient presents with:  Gout: c/o inflammation left hand index finger    HPI:   Dominik Zamora is a 71year old male who presents to clinic with complaints of L hand index finger pain, swelling, tenderness to touch, and redness for the past 2 weeks.   Had o minimal tenderness to palpation. Able to flex at MCP joint unable to flex at DIP/PIP joints due to swelling/pain. ASSESSMENT AND PLAN:   1. Acute gout, unspecified cause, unspecified site  -CMP reviewed.   Given that Toradol did not help and the medicat

## 2020-07-13 PROBLEM — M65.331 TRIGGER MIDDLE FINGER OF RIGHT HAND: Status: ACTIVE | Noted: 2020-07-13

## 2020-07-17 ENCOUNTER — OFFICE VISIT (OUTPATIENT)
Dept: PODIATRY CLINIC | Facility: CLINIC | Age: 70
End: 2020-07-17
Payer: COMMERCIAL

## 2020-07-17 VITALS — SYSTOLIC BLOOD PRESSURE: 132 MMHG | HEART RATE: 66 BPM | DIASTOLIC BLOOD PRESSURE: 70 MMHG

## 2020-07-17 DIAGNOSIS — M79.672 LEFT FOOT PAIN: ICD-10-CM

## 2020-07-17 DIAGNOSIS — M72.2 PLANTAR FASCIITIS OF LEFT FOOT: Primary | ICD-10-CM

## 2020-07-17 DIAGNOSIS — Z87.898 HISTORY OF PREDIABETES: ICD-10-CM

## 2020-07-17 PROCEDURE — 20550 NJX 1 TENDON SHEATH/LIGAMENT: CPT | Performed by: PODIATRIST

## 2020-07-17 PROCEDURE — 3078F DIAST BP <80 MM HG: CPT | Performed by: PODIATRIST

## 2020-07-17 PROCEDURE — 3075F SYST BP GE 130 - 139MM HG: CPT | Performed by: PODIATRIST

## 2020-07-17 RX ORDER — DEXAMETHASONE SODIUM PHOSPHATE 4 MG/ML
2 VIAL (ML) INJECTION ONCE
Status: COMPLETED | OUTPATIENT
Start: 2020-07-17 | End: 2020-07-17

## 2020-07-17 RX ORDER — CELECOXIB 200 MG/1
200 CAPSULE ORAL DAILY
Qty: 30 CAPSULE | Refills: 0 | Status: SHIPPED | OUTPATIENT
Start: 2020-07-17 | End: 2020-07-18

## 2020-07-17 NOTE — PROCEDURES
Per Dr Danielle Paz to draw up .5ml of dexamethasone sodium phosphate, .5ml kenalog-10 and .5ml sesorcaine 0.5% for a left foot injection. Pt had left office before I could obtain post injection vitals.

## 2020-07-17 NOTE — PROGRESS NOTES
Delaney Ramirez is a 71year old male.  Patient presents with:  New Patient: heel pain, left foot - pain scale 8/10 - takes ibuprofen as needed - has had pain for about 2 years - previous DPM gave 1 cortisone injection and would not give another as he usuall SURGICAL HISTORY      Arthrocentesis of the right hip trochanteric bursa   • SKIN SURGERY  2007    Leg, Skin cancer, Surgically removed- no othe treatment       Family History   Problem Relation Age of Onset   • Hypertension Mother    • Thyroid disease Mot palpation is noted to the central medial band of his left plantar fascial ligament with a trigger point area on the plantar medial aspect of the heel. No associated abnormal swelling, atrophic changes or palpable masses/defects are noted.   Decreased ankle injection and the cold therapy well.   He was instructed to ice the affected area as directed, do stretching exercises as demonstrated and directed, avoid going barefoot, and refrain from any heavy activity (including but not limited to extended periods of

## 2020-07-18 RX ORDER — CELECOXIB 200 MG/1
CAPSULE ORAL
Qty: 90 CAPSULE | Refills: 0 | Status: SHIPPED | OUTPATIENT
Start: 2020-07-18 | End: 2020-08-15 | Stop reason: ALTCHOICE

## 2020-07-31 ENCOUNTER — OFFICE VISIT (OUTPATIENT)
Dept: PODIATRY CLINIC | Facility: CLINIC | Age: 70
End: 2020-07-31
Payer: COMMERCIAL

## 2020-07-31 DIAGNOSIS — Z87.898 HISTORY OF PREDIABETES: ICD-10-CM

## 2020-07-31 DIAGNOSIS — M72.2 PLANTAR FASCIITIS OF LEFT FOOT: Primary | ICD-10-CM

## 2020-07-31 DIAGNOSIS — M79.672 LEFT FOOT PAIN: ICD-10-CM

## 2020-07-31 PROCEDURE — 99213 OFFICE O/P EST LOW 20 MIN: CPT | Performed by: PODIATRIST

## 2020-07-31 PROCEDURE — L4397 STATIC OR DYNAMI AFO PRE OTS: HCPCS | Performed by: PODIATRIST

## 2020-07-31 NOTE — PROGRESS NOTES
Jorge Fonseca is a 71year old male. Patient presents with: Follow-up:  Plantar fasciitis left  HPI:     This 72-year-old male patient returns to clinic today complaining of left heel pain. He states that his pain is a lot better with pain less intense. • Glaucoma Neg    • Macular degeneration Neg       Social History    Socioeconomic History      Marital status:       Spouse name: Not on file      Number of children: Not on file      Years of education: Not on file      Highest education level: contractures are noted.     ASSESSMENT AND PLAN:   Diagnoses and all orders for this visit:    Plantar fasciitis of left foot    History of prediabetes    Left foot pain        Plan: Discussed the clinical findings with the patient along with the treatment

## 2020-08-15 ENCOUNTER — OFFICE VISIT (OUTPATIENT)
Dept: FAMILY MEDICINE CLINIC | Facility: CLINIC | Age: 70
End: 2020-08-15
Payer: COMMERCIAL

## 2020-08-15 VITALS
RESPIRATION RATE: 17 BRPM | WEIGHT: 172.13 LBS | SYSTOLIC BLOOD PRESSURE: 135 MMHG | DIASTOLIC BLOOD PRESSURE: 76 MMHG | HEIGHT: 69 IN | HEART RATE: 72 BPM | BODY MASS INDEX: 25.5 KG/M2

## 2020-08-15 DIAGNOSIS — B02.9 HERPES ZOSTER WITHOUT COMPLICATION: Primary | ICD-10-CM

## 2020-08-15 PROCEDURE — G0463 HOSPITAL OUTPT CLINIC VISIT: HCPCS | Performed by: PHYSICIAN ASSISTANT

## 2020-08-15 PROCEDURE — 3078F DIAST BP <80 MM HG: CPT | Performed by: PHYSICIAN ASSISTANT

## 2020-08-15 PROCEDURE — 99213 OFFICE O/P EST LOW 20 MIN: CPT | Performed by: PHYSICIAN ASSISTANT

## 2020-08-15 PROCEDURE — 3075F SYST BP GE 130 - 139MM HG: CPT | Performed by: PHYSICIAN ASSISTANT

## 2020-08-15 PROCEDURE — 3008F BODY MASS INDEX DOCD: CPT | Performed by: PHYSICIAN ASSISTANT

## 2020-08-15 RX ORDER — GABAPENTIN 300 MG/1
300 CAPSULE ORAL 3 TIMES DAILY
Qty: 90 CAPSULE | Refills: 0 | Status: SHIPPED | OUTPATIENT
Start: 2020-08-15 | End: 2020-09-14

## 2020-08-15 RX ORDER — VALACYCLOVIR HYDROCHLORIDE 1 G/1
1 TABLET, FILM COATED ORAL 3 TIMES DAILY
Qty: 21 TABLET | Refills: 0 | Status: SHIPPED | OUTPATIENT
Start: 2020-08-15 | End: 2020-08-22 | Stop reason: ALTCHOICE

## 2020-08-15 NOTE — PROGRESS NOTES
HPI:   Rash   This is a new problem. The current episode started in the past 7 days. The problem is unchanged. The affected locations include the back. The rash is characterized by burning, pain, redness and itchiness.  Pertinent negatives include no conges Surgical History:     Past Surgical History:   Procedure Laterality Date   • Colonoscopy N/A 5/19/2017    Procedure: COLONOSCOPY;  Surgeon: Zaynab Schaefer MD;  Location: [facility] ENDOSCOPY   • Ir epidural steriod injection  2009    epidural steroid injection status: Not on file      Intimate partner violence:        Fear of current or ex partner: Not on file        Emotionally abused: Not on file        Physically abused: Not on file        Forced sexual activity: Not on file    Other Topics      Concerns: Ear: External ear normal.   Left Ear: External ear normal.   Nose: Nose normal.   Eyes: Conjunctivae are normal. Right eye exhibits no discharge. Left eye exhibits no discharge.    Cardiovascular: Normal rate, regular rhythm, S1 normal, S2 normal and normal

## 2020-08-16 NOTE — ASSESSMENT & PLAN NOTE
Start Valtrex 1 g PO TID for 7 days. Advise patient to take Gabapentin 300 mg PO TID as needed for pain and apply Hydrocortisone 2.5% cream for itching.

## 2020-08-19 ENCOUNTER — OFFICE VISIT (OUTPATIENT)
Dept: PODIATRY CLINIC | Facility: CLINIC | Age: 70
End: 2020-08-19
Payer: COMMERCIAL

## 2020-08-19 DIAGNOSIS — M72.2 PLANTAR FASCIITIS OF LEFT FOOT: Primary | ICD-10-CM

## 2020-08-19 DIAGNOSIS — M72.2 PLANTAR FASCIITIS OF LEFT FOOT: ICD-10-CM

## 2020-08-19 PROCEDURE — 20550 NJX 1 TENDON SHEATH/LIGAMENT: CPT | Performed by: PODIATRIST

## 2020-08-19 RX ORDER — CELECOXIB 200 MG/1
200 CAPSULE ORAL DAILY
Qty: 30 CAPSULE | Refills: 0 | Status: SHIPPED | OUTPATIENT
Start: 2020-08-19 | End: 2020-08-25

## 2020-08-19 RX ORDER — DEXAMETHASONE SODIUM PHOSPHATE 4 MG/ML
2 INJECTION, SOLUTION INTRA-ARTICULAR; INTRALESIONAL; INTRAMUSCULAR; INTRAVENOUS; SOFT TISSUE ONCE
Status: COMPLETED | OUTPATIENT
Start: 2020-08-19 | End: 2020-08-19

## 2020-08-19 NOTE — PROGRESS NOTES
Jessica Day is a 71year old male. Patient presents with: Follow-up:  Plantar fasciitis left  HPI:     This 60-year-old male patient returns to clinic today in follow-up.   He states that his pain in his left foot is overall better with no significant c 2009    epidural steroid injection to back x 1   • OTHER SURGICAL HISTORY      Arthrocentesis of the right hip trochanteric bursa   • SKIN SURGERY  2007    Leg, Skin cancer, Surgically removed- no othe treatment       Family History   Problem Relation Age muscle groups are graded 5 out of 5 in the foot and ankle.   Pain on palpation is noted to the central and medial bands of his left plantar fascial ligament which is overall decreased as compared to that noted at his last visit with a trigger point area not physical therapy and encouraged patient to call and make an appointment as he is hesitant. He states he is currently on medication for shingles which is upsetting his stomach. He states the Celebrex has never upset his stomach.   He was instructed to call

## 2020-08-19 NOTE — PROCEDURES
Per Dr Naren Simms to draw up .5ml of dexamethasone sodium phosphate, .5ml kenalog-10 and .5ml sesorcaine 0.5% for a left foot injection. Pt had left office before I could obtain post injection vitals.

## 2020-08-20 ENCOUNTER — TELEPHONE (OUTPATIENT)
Dept: PODIATRY CLINIC | Facility: CLINIC | Age: 70
End: 2020-08-20

## 2020-08-20 NOTE — TELEPHONE ENCOUNTER
Physical therapy referral was entered as outgoing. Please provide the name and location where the patient will be going.

## 2020-08-21 NOTE — TELEPHONE ENCOUNTER
Brisa Simons, DORIAN  You 32 minutes ago (11:21 AM)      Kennedy Torres,     With patient living a distance away. Jay Lackey wanted something closer to home.   Gave him a broad referral so that he may pick the location that is most convenient for him to do his physical th

## 2020-08-21 NOTE — TELEPHONE ENCOUNTER
Called pt to see where he plans to go for PT. He states he was going to call insurance to check benefits and see where he can go. He will CB and let us know when he decides.

## 2020-08-22 ENCOUNTER — OFFICE VISIT (OUTPATIENT)
Dept: FAMILY MEDICINE CLINIC | Facility: CLINIC | Age: 70
End: 2020-08-22
Payer: COMMERCIAL

## 2020-08-22 VITALS
WEIGHT: 170 LBS | SYSTOLIC BLOOD PRESSURE: 138 MMHG | BODY MASS INDEX: 25.18 KG/M2 | TEMPERATURE: 99 F | HEIGHT: 69 IN | DIASTOLIC BLOOD PRESSURE: 65 MMHG | HEART RATE: 85 BPM

## 2020-08-22 DIAGNOSIS — B02.9 HERPES ZOSTER WITHOUT COMPLICATION: Primary | ICD-10-CM

## 2020-08-22 PROCEDURE — 99213 OFFICE O/P EST LOW 20 MIN: CPT | Performed by: NURSE PRACTITIONER

## 2020-08-22 PROCEDURE — G0463 HOSPITAL OUTPT CLINIC VISIT: HCPCS | Performed by: NURSE PRACTITIONER

## 2020-08-22 PROCEDURE — 3075F SYST BP GE 130 - 139MM HG: CPT | Performed by: NURSE PRACTITIONER

## 2020-08-22 PROCEDURE — 3008F BODY MASS INDEX DOCD: CPT | Performed by: NURSE PRACTITIONER

## 2020-08-22 PROCEDURE — 3078F DIAST BP <80 MM HG: CPT | Performed by: NURSE PRACTITIONER

## 2020-08-22 RX ORDER — HYDROCODONE BITARTRATE AND ACETAMINOPHEN 5; 325 MG/1; MG/1
1 TABLET ORAL EVERY 6 HOURS PRN
Qty: 30 TABLET | Refills: 0 | Status: SHIPPED | OUTPATIENT
Start: 2020-08-22 | End: 2020-09-14

## 2020-08-25 RX ORDER — CELECOXIB 200 MG/1
CAPSULE ORAL
Qty: 90 CAPSULE | Refills: 0 | Status: SHIPPED | OUTPATIENT
Start: 2020-08-25

## 2020-08-31 ENCOUNTER — OFFICE VISIT (OUTPATIENT)
Dept: FAMILY MEDICINE CLINIC | Facility: CLINIC | Age: 70
End: 2020-08-31
Payer: COMMERCIAL

## 2020-08-31 VITALS
TEMPERATURE: 97 F | BODY MASS INDEX: 25.92 KG/M2 | HEART RATE: 77 BPM | HEIGHT: 69 IN | SYSTOLIC BLOOD PRESSURE: 137 MMHG | WEIGHT: 175 LBS | DIASTOLIC BLOOD PRESSURE: 71 MMHG

## 2020-08-31 DIAGNOSIS — R19.01 RIGHT UPPER QUADRANT ABDOMINAL MASS: ICD-10-CM

## 2020-08-31 DIAGNOSIS — H93.11 TINNITUS, RIGHT EAR: ICD-10-CM

## 2020-08-31 DIAGNOSIS — B02.8 HERPES ZOSTER COMPLICATION: Primary | ICD-10-CM

## 2020-08-31 DIAGNOSIS — M46.96 UNSPECIFIED INFLAMMATORY SPONDYLOPATHY, LUMBAR REGION (HCC): ICD-10-CM

## 2020-08-31 DIAGNOSIS — K59.00 CONSTIPATION, UNSPECIFIED CONSTIPATION TYPE: ICD-10-CM

## 2020-08-31 PROCEDURE — 3078F DIAST BP <80 MM HG: CPT | Performed by: FAMILY MEDICINE

## 2020-08-31 PROCEDURE — 3008F BODY MASS INDEX DOCD: CPT | Performed by: FAMILY MEDICINE

## 2020-08-31 PROCEDURE — 99214 OFFICE O/P EST MOD 30 MIN: CPT | Performed by: FAMILY MEDICINE

## 2020-08-31 PROCEDURE — G0463 HOSPITAL OUTPT CLINIC VISIT: HCPCS | Performed by: FAMILY MEDICINE

## 2020-08-31 PROCEDURE — 3075F SYST BP GE 130 - 139MM HG: CPT | Performed by: FAMILY MEDICINE

## 2020-08-31 RX ORDER — ASPIRIN 81 MG
100 TABLET, DELAYED RELEASE (ENTERIC COATED) ORAL 2 TIMES DAILY
Qty: 30 TABLET | Refills: 2 | Status: SHIPPED | OUTPATIENT
Start: 2020-08-31 | End: 2021-02-11

## 2020-08-31 NOTE — PROGRESS NOTES
Patient presents with:  Shingles: c/o large mass on left side of abdomen, rash has cleared     HPI:   Liana Sosa is a 71year old male who presents to clinic with complaints of left-sided abdominal mass first noted 3 weeks ago.   Feels that the mass has recover. 2. Tinnitus, right ear  - ENT - INTERNAL    3. Right upper quadrant abdominal mass  - r/o actual hernia. - US ABDOMEN COMPLETE (CPT=76700); Future    4. Constipation, unspecified constipation type  - docusate sodium 100 MG Oral Tab;  Take 1 ta

## 2020-09-03 ENCOUNTER — OFFICE VISIT (OUTPATIENT)
Dept: OTOLARYNGOLOGY | Facility: CLINIC | Age: 70
End: 2020-09-03
Payer: COMMERCIAL

## 2020-09-03 ENCOUNTER — OFFICE VISIT (OUTPATIENT)
Dept: AUDIOLOGY | Facility: CLINIC | Age: 70
End: 2020-09-03
Payer: COMMERCIAL

## 2020-09-03 VITALS
DIASTOLIC BLOOD PRESSURE: 68 MMHG | TEMPERATURE: 98 F | HEIGHT: 69 IN | WEIGHT: 175 LBS | SYSTOLIC BLOOD PRESSURE: 132 MMHG | BODY MASS INDEX: 25.92 KG/M2

## 2020-09-03 DIAGNOSIS — H90.3 SENSORINEURAL HEARING LOSS, BILATERAL: ICD-10-CM

## 2020-09-03 DIAGNOSIS — H93.13 TINNITUS, BILATERAL: ICD-10-CM

## 2020-09-03 DIAGNOSIS — H90.3 SENSORINEURAL HEARING LOSS, BILATERAL: Primary | ICD-10-CM

## 2020-09-03 PROCEDURE — G0268 REMOVAL OF IMPACTED WAX MD: HCPCS | Performed by: OTOLARYNGOLOGY

## 2020-09-03 PROCEDURE — 99203 OFFICE O/P NEW LOW 30 MIN: CPT | Performed by: OTOLARYNGOLOGY

## 2020-09-03 PROCEDURE — 92567 TYMPANOMETRY: CPT | Performed by: AUDIOLOGIST

## 2020-09-03 PROCEDURE — 92557 COMPREHENSIVE HEARING TEST: CPT | Performed by: AUDIOLOGIST

## 2020-09-03 PROCEDURE — 3008F BODY MASS INDEX DOCD: CPT | Performed by: OTOLARYNGOLOGY

## 2020-09-03 PROCEDURE — 3078F DIAST BP <80 MM HG: CPT | Performed by: OTOLARYNGOLOGY

## 2020-09-03 PROCEDURE — G0463 HOSPITAL OUTPT CLINIC VISIT: HCPCS | Performed by: OTOLARYNGOLOGY

## 2020-09-03 PROCEDURE — 3075F SYST BP GE 130 - 139MM HG: CPT | Performed by: OTOLARYNGOLOGY

## 2020-09-03 NOTE — PROGRESS NOTES
Laurita Kim is a 71year old male. Patient presents with:  Ringing In Ear: c/o ringing in both ears for a year      HISTORY OF PRESENT ILLNESS  He presents with history of progressive hearing loss bilaterally.  And more difficulty understanding speech b Heart Disease Mother 80           • Cancer Sister         uterine cancer.    • Other (Other) Father         good health   • Diabetes Neg    • Glaucoma Neg    • Macular degeneration Neg        Past Medical History:   Diagnosis Date   • Basal cell carcino Normal. Thyroid gland - Normal.   Eyes Normal Conjunctiva - Right: Normal, Left: Normal. Pupil - Right: Normal, Left: Normal. Fundus - Right: Normal, Left: Normal.   Neurological Normal Memory - Normal. Cranial nerves - Cranial nerves II through XII grossl MG Oral Cap, Take 1 capsule by mouth daily. , Disp: 90 capsule, Rfl: 3  •  simvastatin 20 MG Oral Tab, TAKE 1 TABLET BY MOUTH EVERY NIGHT, Disp: 90 tablet, Rfl: 1  •  HYDROcodone-acetaminophen  MG Oral Tab, Take 1 tablet by mouth every 6 (six) hours a

## 2020-09-03 NOTE — PROGRESS NOTES
AUDIOGRAM     Jorge Fonseca was referred for testing by Ashley Michele for bilateral tinnitus  11/11/1950  [id number]        Otoscopic inspection: right ear no cerumen; left ear no cerumen.        Tests/Procedures  Patient was tested via  standard insert ea

## 2020-09-03 NOTE — PATIENT INSTRUCTIONS
How Hearing Aids Can Help You     Losing your hearing can be frustrating. But hearing aids can help you hear what Carlos Cooper been missing. Not everyone who has hearing loss needs hearing aids.  Hearing aids will most likely help you if your hearing loss:  · K © 7986-2122 [address]. [address] [facility], [facility] [place]. All rights reserved. This information is not intended as a substitute for professional medical care. Always follow your healthcare professional's instructions.

## 2020-09-14 ENCOUNTER — NURSE TRIAGE (OUTPATIENT)
Dept: FAMILY MEDICINE CLINIC | Facility: CLINIC | Age: 70
End: 2020-09-14

## 2020-09-14 ENCOUNTER — OFFICE VISIT (OUTPATIENT)
Dept: FAMILY MEDICINE CLINIC | Facility: CLINIC | Age: 70
End: 2020-09-14
Payer: COMMERCIAL

## 2020-09-14 VITALS
BODY MASS INDEX: 26.07 KG/M2 | SYSTOLIC BLOOD PRESSURE: 134 MMHG | TEMPERATURE: 98 F | DIASTOLIC BLOOD PRESSURE: 70 MMHG | HEIGHT: 69 IN | HEART RATE: 81 BPM | WEIGHT: 176 LBS

## 2020-09-14 DIAGNOSIS — M54.50 ACUTE BILATERAL LOW BACK PAIN WITHOUT SCIATICA: ICD-10-CM

## 2020-09-14 DIAGNOSIS — M48.061 SPINAL STENOSIS OF LUMBAR REGION, UNSPECIFIED WHETHER NEUROGENIC CLAUDICATION PRESENT: ICD-10-CM

## 2020-09-14 DIAGNOSIS — L29.9 ITCH OF SKIN: Primary | ICD-10-CM

## 2020-09-14 PROCEDURE — 3008F BODY MASS INDEX DOCD: CPT | Performed by: FAMILY MEDICINE

## 2020-09-14 PROCEDURE — G0463 HOSPITAL OUTPT CLINIC VISIT: HCPCS | Performed by: FAMILY MEDICINE

## 2020-09-14 PROCEDURE — 99214 OFFICE O/P EST MOD 30 MIN: CPT | Performed by: FAMILY MEDICINE

## 2020-09-14 PROCEDURE — 3075F SYST BP GE 130 - 139MM HG: CPT | Performed by: FAMILY MEDICINE

## 2020-09-14 PROCEDURE — 3078F DIAST BP <80 MM HG: CPT | Performed by: FAMILY MEDICINE

## 2020-09-14 RX ORDER — HYDROCODONE BITARTRATE AND ACETAMINOPHEN 10; 325 MG/1; MG/1
1 TABLET ORAL EVERY 8 HOURS PRN
Qty: 60 TABLET | Refills: 0 | Status: SHIPPED | OUTPATIENT
Start: 2020-09-14 | End: 2021-04-12

## 2020-09-14 NOTE — PROGRESS NOTES
Patient presents with:  Hip Pain: c/o hip and low back pain after fall x 1 week, concerned to due hernia    HPI:   Abdelrahman Lindsay is a 71year old male who presents to clinic with complaints of lumbar low back pain that is chronic in nature but flared up a suspicious lesions  HEENT: atraumatic, normocephalic  EYES: PERRLA, EOMI,conjunctiva are clear  NECK: supple, no adenopathy  LUNGS: clear to auscultation  CARDIO: RRR without murmur  MUSCULOSKELETAL: Tender to palpation over lumbar paraspinal muscles, no b

## 2020-09-14 NOTE — TELEPHONE ENCOUNTER
Action Requested: Summary for Provider     []  Critical Lab, Recommendations Needed  [] Need Additional Advice  []   FYI    []   Need Orders  [] Need Medications Sent to Pharmacy  []  Other     SUMMARY:   Appointment made for today 9/14/2020    The patient

## 2020-09-15 ENCOUNTER — HOSPITAL ENCOUNTER (OUTPATIENT)
Dept: GENERAL RADIOLOGY | Age: 70
Discharge: HOME | End: 2020-09-15
Attending: FAMILY MEDICINE
Payer: MEDICARE

## 2020-09-15 DIAGNOSIS — M54.50 ACUTE BILATERAL LOW BACK PAIN WITHOUT SCIATICA: ICD-10-CM

## 2020-09-15 PROCEDURE — 72110 X-RAY EXAM L-2 SPINE 4/>VWS: CPT | Performed by: FAMILY MEDICINE

## 2020-10-06 ENCOUNTER — ORDER TRANSCRIPTION (OUTPATIENT)
Dept: PHYSICAL THERAPY | Age: 70
End: 2020-10-06

## 2020-10-06 ENCOUNTER — OFFICE VISIT (OUTPATIENT)
Dept: PHYSICAL THERAPY | Age: 70
End: 2020-10-06
Attending: NEUROLOGICAL SURGERY
Payer: MEDICARE

## 2020-10-06 DIAGNOSIS — M54.50 LOW BACK PAIN: Primary | ICD-10-CM

## 2020-10-06 PROCEDURE — 97140 MANUAL THERAPY 1/> REGIONS: CPT

## 2020-10-06 PROCEDURE — 97162 PT EVAL MOD COMPLEX 30 MIN: CPT

## 2020-10-06 NOTE — PROGRESS NOTES
LUMBAR SPINE EVALUATION:   Referring Physician: Dr. Agusto Ward  Date of Onset: Aug 2020 Date of Service: 10/6/2020   Low back pain (M54.5)  PATIENT SUMMARY:   Leeann Minaya is a 71year old y/o male who presents to therapy today with complaints of low back p discussed evaluation findings, pathology, POC and HEP. Pt voiced understanding and performs HEP correctly without reported pain. Skilled Physical Therapy is medically necessary to address the above impairments and reach functional goals.      In agreement ergonomics and importance of remaining active  Patient was instructed in and issued a HEP for: prone pressups every 1-2 hours, sitting w/lumbar roll  Treatment provided today: prone lumbar PA mobs     Charges: PT Eval Moderate Complexity, MTx1      Total T

## 2020-10-07 ENCOUNTER — NURSE TRIAGE (OUTPATIENT)
Dept: FAMILY MEDICINE CLINIC | Facility: CLINIC | Age: 70
End: 2020-10-07

## 2020-10-07 ENCOUNTER — APPOINTMENT (OUTPATIENT)
Dept: PHYSICAL THERAPY | Age: 70
End: 2020-10-07
Attending: NEUROLOGICAL SURGERY
Payer: MEDICARE

## 2020-10-07 NOTE — TELEPHONE ENCOUNTER
Action Requested: Summary for Provider     []  Critical Lab, Recommendations Needed  [] Need Additional Advice  []   FYI    []   Need Orders  [] Need Medications Sent to Pharmacy  []  Other     SUMMARY: Per protocol advised : OV within 2 weeks , has appt f

## 2020-10-08 ENCOUNTER — OFFICE VISIT (OUTPATIENT)
Dept: PHYSICAL THERAPY | Age: 70
End: 2020-10-08
Attending: NEUROLOGICAL SURGERY
Payer: MEDICARE

## 2020-10-08 PROCEDURE — 97140 MANUAL THERAPY 1/> REGIONS: CPT

## 2020-10-08 PROCEDURE — 97110 THERAPEUTIC EXERCISES: CPT

## 2020-10-08 NOTE — PROGRESS NOTES
Dx:   Low back pain (M54.5)      Insurance (Authorized # of Visits):  [facility] Medicare   [address] visits: 6  Authorizing Physician: Dr. Gerry Krishnamurthy  Next MD visit: none scheduled  Fall Risk: standard         Precautions:    FOTO initial: status- 60/100          limitation

## 2020-10-09 ENCOUNTER — OFFICE VISIT (OUTPATIENT)
Dept: FAMILY MEDICINE CLINIC | Facility: CLINIC | Age: 70
End: 2020-10-09
Payer: COMMERCIAL

## 2020-10-09 VITALS
SYSTOLIC BLOOD PRESSURE: 143 MMHG | HEART RATE: 84 BPM | HEIGHT: 69 IN | TEMPERATURE: 99 F | WEIGHT: 171 LBS | DIASTOLIC BLOOD PRESSURE: 66 MMHG | BODY MASS INDEX: 25.33 KG/M2

## 2020-10-09 DIAGNOSIS — R53.83 FATIGUE, UNSPECIFIED TYPE: Primary | ICD-10-CM

## 2020-10-09 DIAGNOSIS — Z23 NEED FOR SHINGLES VACCINE: ICD-10-CM

## 2020-10-09 DIAGNOSIS — E55.9 VITAMIN D DEFICIENCY: ICD-10-CM

## 2020-10-09 PROCEDURE — 99214 OFFICE O/P EST MOD 30 MIN: CPT | Performed by: FAMILY MEDICINE

## 2020-10-09 PROCEDURE — 3078F DIAST BP <80 MM HG: CPT | Performed by: FAMILY MEDICINE

## 2020-10-09 PROCEDURE — 3008F BODY MASS INDEX DOCD: CPT | Performed by: FAMILY MEDICINE

## 2020-10-09 PROCEDURE — G0463 HOSPITAL OUTPT CLINIC VISIT: HCPCS | Performed by: FAMILY MEDICINE

## 2020-10-09 PROCEDURE — 3077F SYST BP >= 140 MM HG: CPT | Performed by: FAMILY MEDICINE

## 2020-10-09 RX ORDER — IBUPROFEN 800 MG/1
TABLET ORAL
COMMUNITY
Start: 2020-09-22

## 2020-10-09 RX ORDER — ZOSTER VACCINE RECOMBINANT, ADJUVANTED 50 MCG/0.5
0.5 KIT INTRAMUSCULAR ONCE
Qty: 1 EACH | Refills: 0 | Status: SHIPPED | OUTPATIENT
Start: 2020-10-09 | End: 2020-10-09

## 2020-10-09 RX ORDER — ERGOCALCIFEROL 1.25 MG/1
50000 CAPSULE ORAL WEEKLY
Qty: 12 CAPSULE | Refills: 0 | Status: SHIPPED | OUTPATIENT
Start: 2020-10-09 | End: 2021-01-12

## 2020-10-09 NOTE — PROGRESS NOTES
Patient presents with:  Fatigue: c/o fatigue and lack of appetite    HPI:   Nikos Leiva is a 71year old male who presents to clinic with complaints of extreme fatigue, weakness and lack of appetite for the past 2 weeks. Patient has lost 5 pounds.   Sta 25-HYDROXY  - TSH+FREE T4; Future    2. Need for shingles vaccine  -Patient has recovered completely from all shingles outbreak. Okay to receive shingles vaccine. - Zoster Vac Recomb Adjuvanted (SHINGRIX) 50 MCG/0.5ML Intramuscular Recon Susp;  Inject 0.5

## 2020-10-10 ENCOUNTER — LAB ENCOUNTER (OUTPATIENT)
Dept: LAB | Age: 70
End: 2020-10-10
Attending: FAMILY MEDICINE
Payer: MEDICARE

## 2020-10-10 DIAGNOSIS — R53.83 FATIGUE, UNSPECIFIED TYPE: ICD-10-CM

## 2020-10-10 PROCEDURE — 82306 VITAMIN D 25 HYDROXY: CPT | Performed by: FAMILY MEDICINE

## 2020-10-10 PROCEDURE — 85025 COMPLETE CBC W/AUTO DIFF WBC: CPT | Performed by: FAMILY MEDICINE

## 2020-10-10 PROCEDURE — 84439 ASSAY OF FREE THYROXINE: CPT

## 2020-10-10 PROCEDURE — 84443 ASSAY THYROID STIM HORMONE: CPT

## 2020-10-10 PROCEDURE — 36415 COLL VENOUS BLD VENIPUNCTURE: CPT | Performed by: FAMILY MEDICINE

## 2020-10-13 ENCOUNTER — OFFICE VISIT (OUTPATIENT)
Dept: PHYSICAL THERAPY | Age: 70
End: 2020-10-13
Attending: NEUROLOGICAL SURGERY
Payer: MEDICARE

## 2020-10-13 PROCEDURE — 97110 THERAPEUTIC EXERCISES: CPT

## 2020-10-13 PROCEDURE — 97140 MANUAL THERAPY 1/> REGIONS: CPT

## 2020-10-13 NOTE — PROGRESS NOTES
Dx:   Low back pain (M54.5)      Insurance (Authorized # of Visits):  [facility] Medicare   [address] visits: 6  Authorizing Physician: Dr. Marv Can  Next MD visit: none scheduled  Fall Risk: standard         Precautions:    FOTO initial: status- 60/100          limitation HEP: 10/8/20- hooklying TA bracing, sidelying clamshell     Charges: MTx1, TEx2      Total Timed Treatment: 40 min  Total Treatment Time: 40 min

## 2020-10-15 ENCOUNTER — OFFICE VISIT (OUTPATIENT)
Dept: PHYSICAL THERAPY | Age: 70
End: 2020-10-15
Attending: NEUROLOGICAL SURGERY
Payer: MEDICARE

## 2020-10-15 PROCEDURE — 97110 THERAPEUTIC EXERCISES: CPT

## 2020-10-15 NOTE — PROGRESS NOTES
Dx:   Low back pain (M54.5)      Insurance (Authorized # of Visits):  [facility] Medicare   [address] visits: 6  Authorizing Physician: Dr. Enriqueta South  Next MD visit: none scheduled  Fall Risk: standard         Precautions:    FOTO initial: status- 60/100          limitation SL B  Supine sciatic nerve glide 15xB  Supine knee to opposite shoulder stretch [id number] B  Bridge w/ball squeeze 5 secx15       Manual therapy:  Prone lumbar paraspinal oscillations  Prone lumbar paraspinal MFR  Prone lumbar PA mobs Manual therapy:  Prone

## 2020-10-20 ENCOUNTER — OFFICE VISIT (OUTPATIENT)
Dept: PHYSICAL THERAPY | Age: 70
End: 2020-10-20
Attending: NEUROLOGICAL SURGERY
Payer: MEDICARE

## 2020-10-20 PROCEDURE — 97110 THERAPEUTIC EXERCISES: CPT

## 2020-10-20 PROCEDURE — 97140 MANUAL THERAPY 1/> REGIONS: CPT

## 2020-10-20 NOTE — PROGRESS NOTES
Dx:   Low back pain (M54.5)      Insurance (Authorized # of Visits):  [facility] Medicare   [address] visits: 6  Authorizing Physician: Dr. Iliana Perrin  Next MD visit: none scheduled  Fall Risk: standard         Precautions:    FOTO initial: status- 60/100          limitation stretch [id number] B  Bridge w/ball squeeze 5 secx15   There ex:  nustep L5 x 8 min  Prone pressups 10x  Prone pressups w/sag 10x  Supine sciatic nerve glide 15xB  Supine knee to opposite shoulder stretch [id number] B              Manual therapy:  Prone lumbar p

## 2020-10-22 ENCOUNTER — OFFICE VISIT (OUTPATIENT)
Dept: PHYSICAL THERAPY | Age: 70
End: 2020-10-22
Attending: NEUROLOGICAL SURGERY
Payer: MEDICARE

## 2020-10-22 PROCEDURE — 97140 MANUAL THERAPY 1/> REGIONS: CPT

## 2020-10-22 PROCEDURE — 97110 THERAPEUTIC EXERCISES: CPT

## 2020-10-22 NOTE — PROGRESS NOTES
Dx:   Low back pain (M54.5)      Insurance (Authorized # of Visits):  [facility] Medicare   [address] visits: 6  Authorizing Physician: Dr. Rhys Monreal  Next MD visit: none scheduled  Fall Risk: standard         Precautions:    FOTO initial: status- 60/100          limitation pressups w/sag 10x  Supine sciatic nerve glide 15xB  Supine knee to opposite shoulder stretch 59aicx0 B           There ex:  nustep L5 x 8 min  Prone pressups 10x  Seated sciatic nerve glide 15xB  Standing hip abd/ext/flex yellow TB 15xea B  Seated pirifor

## 2020-10-26 ENCOUNTER — OFFICE VISIT (OUTPATIENT)
Dept: PHYSICAL THERAPY | Age: 70
End: 2020-10-26
Attending: NEUROLOGICAL SURGERY
Payer: MEDICARE

## 2020-10-26 PROCEDURE — 97110 THERAPEUTIC EXERCISES: CPT

## 2020-10-26 PROCEDURE — 97140 MANUAL THERAPY 1/> REGIONS: CPT

## 2020-10-26 NOTE — PROGRESS NOTES
Dx:   Low back pain (M54.5)      Insurance (Authorized # of Visits):  [facility] Medicare   [address] visits: 8  Authorizing Physician: Dr. Elder ref.  provider found  Next MD visit: none scheduled  Fall Risk: standard         Precautions:    FOTO initial: status- 60/100 15xB  Supine knee to opposite shoulder stretch [id number] B  Bridge w/ball squeeze 5 secx15   There ex:  nustep L5 x 8 min  Prone pressups 10x  Prone pressups w/sag 10x  Supine sciatic nerve glide 15xB  Supine knee to opposite shoulder stretch [id number] B

## 2020-10-27 ENCOUNTER — APPOINTMENT (OUTPATIENT)
Dept: PHYSICAL THERAPY | Age: 70
End: 2020-10-27
Attending: NEUROLOGICAL SURGERY
Payer: MEDICARE

## 2020-10-29 ENCOUNTER — OFFICE VISIT (OUTPATIENT)
Dept: PHYSICAL THERAPY | Age: 70
End: 2020-10-29
Attending: NEUROLOGICAL SURGERY
Payer: MEDICARE

## 2020-10-29 PROCEDURE — 97140 MANUAL THERAPY 1/> REGIONS: CPT

## 2020-10-29 PROCEDURE — 97110 THERAPEUTIC EXERCISES: CPT

## 2020-10-29 NOTE — PROGRESS NOTES
Stacey  Pt has attended 8 visits in Physical Therapy.      Dx:   Low back pain (M54.5)      Insurance (Authorized # of Visits):  [facility] Medicare   [address] visits: 6  Authorizing Physician: Dr. Braxton Hilliard MD visit: none scheduled  Fall Risk: standard TX#: 4/8 Date: 10/20/20             TX#: 5/8 Date: 10/22/20  Tx#: 6/8 Date: 10/26/20  Tx: 7/8 Date: 10/29/20  Tx: 8/8   There ex:  Standing lumbar ext over table 10x   hooklying TA bracing 5 secx15  hooklying TA bracing w/ball squeeze 5 secx15 lumbar PA mobs  Re-assessment                     HEP: 10/8/20- hooklying TA bracing, sidelying clamshell            10/15/20- supine sciatic nerve glide, supine knee to opposite shoulder stretch           10/20/20-prone pressups            10/29/20- red T

## 2020-11-13 ENCOUNTER — TELEPHONE (OUTPATIENT)
Dept: FAMILY MEDICINE CLINIC | Facility: CLINIC | Age: 70
End: 2020-11-13

## 2020-11-13 DIAGNOSIS — Z20.828 EXPOSURE TO SARS-ASSOCIATED CORONAVIRUS: Primary | ICD-10-CM

## 2020-11-13 NOTE — TELEPHONE ENCOUNTER
With [facility]  Patty Cuellar ID# [id number]   Verified name and . Patient reports daughter tested positive for COVID and they have been in close contact. Patient denies symptoms at this. Patient is asymptomatic. [facility]UP order pended.     Patient ha

## 2020-11-16 ENCOUNTER — APPOINTMENT (OUTPATIENT)
Dept: LAB | Age: 70
End: 2020-11-16
Attending: FAMILY MEDICINE
Payer: MEDICARE

## 2020-11-16 DIAGNOSIS — Z20.828 EXPOSURE TO SARS-ASSOCIATED CORONAVIRUS: ICD-10-CM

## 2021-01-11 DIAGNOSIS — E55.9 VITAMIN D DEFICIENCY: ICD-10-CM

## 2021-01-11 NOTE — TELEPHONE ENCOUNTER
Per pharmacy, pt requesting a refill on VITAMIN D2 50,000IU (ERGO) CAP RX  Sig: Take 1 capsule by mouth 1 time a week for 12 doses  QTY: 12

## 2021-01-12 RX ORDER — ERGOCALCIFEROL 1.25 MG/1
50000 CAPSULE ORAL WEEKLY
Qty: 12 CAPSULE | Refills: 0 | Status: SHIPPED | OUTPATIENT
Start: 2021-01-12 | End: 2021-06-10

## 2021-01-13 DIAGNOSIS — E78.00 HIGH CHOLESTEROL: ICD-10-CM

## 2021-01-13 NOTE — TELEPHONE ENCOUNTER
Current Outpatient Medications:   •  simvastatin 20 MG Oral Tab, TAKE 1 TABLET BY MOUTH EVERY NIGHT, Disp: 90 tablet, Rfl: 1

## 2021-01-14 RX ORDER — SIMVASTATIN 20 MG
TABLET ORAL
Qty: 90 TABLET | Refills: 1 | Status: SHIPPED | OUTPATIENT
Start: 2021-01-14 | End: 2021-07-18

## 2021-02-03 DIAGNOSIS — Z23 NEED FOR VACCINATION: ICD-10-CM

## 2021-02-11 ENCOUNTER — OFFICE VISIT (OUTPATIENT)
Dept: PAIN CLINIC | Facility: HOSPITAL | Age: 71
End: 2021-02-11
Attending: ANESTHESIOLOGY
Payer: MEDICARE

## 2021-02-11 VITALS
HEIGHT: 69 IN | DIASTOLIC BLOOD PRESSURE: 70 MMHG | RESPIRATION RATE: 18 BRPM | SYSTOLIC BLOOD PRESSURE: 123 MMHG | HEART RATE: 70 BPM | OXYGEN SATURATION: 96 % | BODY MASS INDEX: 25.18 KG/M2 | WEIGHT: 170 LBS

## 2021-02-11 DIAGNOSIS — M47.816 FACET ARTHRITIS OF LUMBAR REGION: Primary | ICD-10-CM

## 2021-02-11 DIAGNOSIS — M51.26 HNP (HERNIATED NUCLEUS PULPOSUS), LUMBAR: ICD-10-CM

## 2021-02-11 PROCEDURE — 99211 OFF/OP EST MAY X REQ PHY/QHP: CPT

## 2021-02-11 NOTE — CHRONIC PAIN
Initial Consultation Note      HISTORY OF PRESENT ILLNESS:  Mame Segura is a 79year old old male referred to the pain clinic  for evaluation treatment of his low back pain is a very nice 66-year-old male who looks younger than his stated age developed daily.  90 capsule 3        ALLERGIES:  No Known Allergies    SURGICAL HISTORY:  Past Surgical History:   Procedure Laterality Date   • COLONOSCOPY N/A 5/19/2017    Performed by Jennifer Paige MD at [address] [place] [place] [address] ENDOSCOPY   • IR [address] INJECTION  2009 level: Not on file    Occupational History      Not on file    Social Needs      Financial resource strain: Not on file      Food insecurity        Worry: Not on file        Inability: Not on file      Transportation needs        Medical: Not on file discussed.     PHYSICAL EXAMINATION:   02/11/21  0933   BP: 123/70   BP Location: Left arm   Patient Position: Sitting   Cuff Size: adult   Pulse: 70   Resp: 18   SpO2: 96%   Weight: 170 lb (77.1 kg)   Height: 5' 9\" (1.753 m)     General: Alert and oriente 06/23/2020    CA 9.2 06/23/2020     No results found for: PT    [place] PH[facility]ICIAN MONITORING PROGRAM REVIEWED  No      ASSESSMENT:   79year old old male with lumbar herniated disc at L3-4 and also at L5S1 disc bulging and lumbar degenerative disc disease

## 2021-02-11 NOTE — PROGRESS NOTES
02/11/2021-presents ambulatory to [facility] to establish care;  NEW CONSULT C/O NON RADIATING LBP;  Pt reports a 15yr hx of back pain that has increased over the years;  referred by Dr. Navneet Gleason; is interested in injection therapy;   Examined by Dr. Alfred Espinoza

## 2021-02-12 ENCOUNTER — DOCUMENTATION ONLY (OUTPATIENT)
Dept: PAIN CLINIC | Facility: HOSPITAL | Age: 71
End: 2021-02-12

## 2021-02-17 ENCOUNTER — SURGERY CENTER DOCUMENTATION (OUTPATIENT)
Dept: SURGERY | Age: 71
End: 2021-02-17

## 2021-02-17 NOTE — PROCEDURES
Iker HA 7.            Patient: María Garcia  MR #: [id number]  : 1950        Operative Report        Date of procedure: 2021    Preoperative diagnosis: No diagnosis found. Postop diagnosis:No diagnosis found.

## 2021-04-05 ENCOUNTER — TELEPHONE (OUTPATIENT)
Dept: CASE MANAGEMENT | Age: 71
End: 2021-04-05

## 2021-04-05 NOTE — TELEPHONE ENCOUNTER
Patient is eligible for a 2021 Medicare Annual Wellness visit. Language Line  Tato Chowdhury [phone] discussed w/patient. Appt scheduled for 4/12/21.

## 2021-04-12 ENCOUNTER — OFFICE VISIT (OUTPATIENT)
Dept: FAMILY MEDICINE CLINIC | Facility: CLINIC | Age: 71
End: 2021-04-12
Payer: COMMERCIAL

## 2021-04-12 ENCOUNTER — LAB ENCOUNTER (OUTPATIENT)
Dept: LAB | Age: 71
End: 2021-04-12
Attending: FAMILY MEDICINE
Payer: MEDICARE

## 2021-04-12 VITALS
BODY MASS INDEX: 25.18 KG/M2 | TEMPERATURE: 97 F | HEART RATE: 70 BPM | WEIGHT: 170 LBS | HEIGHT: 69 IN | DIASTOLIC BLOOD PRESSURE: 63 MMHG | SYSTOLIC BLOOD PRESSURE: 123 MMHG

## 2021-04-12 DIAGNOSIS — G47.30 SLEEP APNEA, UNSPECIFIED TYPE: ICD-10-CM

## 2021-04-12 DIAGNOSIS — M46.96 UNSPECIFIED INFLAMMATORY SPONDYLOPATHY, LUMBAR REGION (HCC): ICD-10-CM

## 2021-04-12 DIAGNOSIS — M65.331 TRIGGER MIDDLE FINGER OF RIGHT HAND: ICD-10-CM

## 2021-04-12 DIAGNOSIS — R73.03 PREDIABETES: ICD-10-CM

## 2021-04-12 DIAGNOSIS — I10 ESSENTIAL HYPERTENSION: Chronic | ICD-10-CM

## 2021-04-12 DIAGNOSIS — Z23 NEED FOR SHINGLES VACCINE: ICD-10-CM

## 2021-04-12 DIAGNOSIS — Z00.00 ENCOUNTER FOR ANNUAL HEALTH EXAMINATION: Primary | ICD-10-CM

## 2021-04-12 DIAGNOSIS — H90.3 SENSORINEURAL HEARING LOSS, BILATERAL: ICD-10-CM

## 2021-04-12 DIAGNOSIS — M51.26 DISPLACEMENT OF LUMBAR INTERVERTEBRAL DISC: ICD-10-CM

## 2021-04-12 DIAGNOSIS — Z13.1 SCREENING FOR DIABETES MELLITUS (DM): ICD-10-CM

## 2021-04-12 DIAGNOSIS — Z13.6 SCREENING FOR CARDIOVASCULAR CONDITION: ICD-10-CM

## 2021-04-12 DIAGNOSIS — E79.0 HYPERURICEMIA: ICD-10-CM

## 2021-04-12 DIAGNOSIS — M47.816 FACET ARTHRITIS OF LUMBAR REGION: ICD-10-CM

## 2021-04-12 DIAGNOSIS — M48.061 SPINAL STENOSIS OF LUMBAR REGION, UNSPECIFIED WHETHER NEUROGENIC CLAUDICATION PRESENT: ICD-10-CM

## 2021-04-12 DIAGNOSIS — C44.701: ICD-10-CM

## 2021-04-12 DIAGNOSIS — Z87.39 HISTORY OF GOUT: ICD-10-CM

## 2021-04-12 PROBLEM — M10.9 ACUTE GOUT OF LEFT HAND: Status: RESOLVED | Noted: 2020-06-23 | Resolved: 2021-04-12

## 2021-04-12 PROBLEM — B02.9 HERPES ZOSTER WITHOUT COMPLICATION: Status: RESOLVED | Noted: 2020-08-15 | Resolved: 2021-04-12

## 2021-04-12 PROCEDURE — 3008F BODY MASS INDEX DOCD: CPT | Performed by: FAMILY MEDICINE

## 2021-04-12 PROCEDURE — 3074F SYST BP LT 130 MM HG: CPT | Performed by: FAMILY MEDICINE

## 2021-04-12 PROCEDURE — 80061 LIPID PANEL: CPT | Performed by: FAMILY MEDICINE

## 2021-04-12 PROCEDURE — 83036 HEMOGLOBIN GLYCOSYLATED A1C: CPT | Performed by: FAMILY MEDICINE

## 2021-04-12 PROCEDURE — 36415 COLL VENOUS BLD VENIPUNCTURE: CPT | Performed by: FAMILY MEDICINE

## 2021-04-12 PROCEDURE — 85025 COMPLETE CBC W/AUTO DIFF WBC: CPT | Performed by: FAMILY MEDICINE

## 2021-04-12 PROCEDURE — 96160 PT-FOCUSED HLTH RISK ASSMT: CPT | Performed by: FAMILY MEDICINE

## 2021-04-12 PROCEDURE — 99397 PER PM REEVAL EST PAT 65+ YR: CPT | Performed by: FAMILY MEDICINE

## 2021-04-12 PROCEDURE — G0439 PPPS, SUBSEQ VISIT: HCPCS | Performed by: FAMILY MEDICINE

## 2021-04-12 PROCEDURE — 84443 ASSAY THYROID STIM HORMONE: CPT | Performed by: FAMILY MEDICINE

## 2021-04-12 PROCEDURE — 3078F DIAST BP <80 MM HG: CPT | Performed by: FAMILY MEDICINE

## 2021-04-12 PROCEDURE — 80053 COMPREHEN METABOLIC PANEL: CPT | Performed by: FAMILY MEDICINE

## 2021-04-12 RX ORDER — ZOSTER VACCINE RECOMBINANT, ADJUVANTED 50 MCG/0.5
0.5 KIT INTRAMUSCULAR ONCE
Qty: 1 EACH | Refills: 0 | Status: SHIPPED | OUTPATIENT
Start: 2021-04-12 | End: 2021-04-12

## 2021-04-12 RX ORDER — HYDROCODONE BITARTRATE AND ACETAMINOPHEN 10; 325 MG/1; MG/1
1 TABLET ORAL EVERY 8 HOURS PRN
Qty: 60 TABLET | Refills: 0 | Status: SHIPPED | OUTPATIENT
Start: 2021-04-12 | End: 2021-08-30

## 2021-04-12 NOTE — PROGRESS NOTES
HPI:   Leeann Minaya is a 79year old male who presents for a Medicare Subsequent Annual Wellness visit (Pt already had Initial Annual Wellness).     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 He reports that he does not use drugs.      REVIEW OF SYSTEMS:      Negative except per hpi    EXAM:   /63 (BP Location: Right arm, Patient Position: Sitting, Cuff Size: adult)   Pulse 70   Temp 97.4 °F (36.3 °C) (Tympanic)   Ht 5' 9\" (1 normal, no murmur  Abdomen: normoactive bowel sounds x4; soft, non-distended; nontender; no masses  Extremities: extremities normal, atraumatic, no cyanosis or edema  Pulses: 2+ and symmetric  Vaccination History     Immunization History   Administered Brian relief from epidural spinal injection. Spinal stenosis of lumbar region, unspecified whether neurogenic claudication present  -     HYDROcodone-acetaminophen (NORCO)  MG Oral Tab; Take 1 tablet by mouth every 8 (eight) hours as needed for Pain.   - p Cardiovascular Disease Screening     LDL Annually LDL Cholesterol (mg/dL)   Date Value   06/23/2020 76        EKG - w/ Initial Preventative Physical Exam only, or if medically necessary Electrocardiogram date    Colorectal Cancer Screening      [place] of Persistent     Medications (ACE/ARB, digoxin diuretics, anticonvulsants.)    Potassium  Annually Potassium (mmol/L)   Date Value   06/23/2020 4.3     POTASSIUM (P) (mmol/L)   Date Value   07/09/2016 4.1    No flowsheet data found.     Creatinine  Annuall

## 2021-04-12 NOTE — PATIENT INSTRUCTIONS
Ghazala Raza SCREENING SCHEDULE   Tests on this list are recommended by your physician but may not be covered, or covered at this frequency, by your insurer. Please check with your insurance carrier before scheduling to verify coverage.     PREVENTATI often if abnormal Colonoscopy due on 05/19/2027 Update [facility] if applicable    Flex Sigmoidoscopy Screen  Covered every 5 years No results found for this or any previous visit. No flowsheet data found.      Fecal Occult Blood   Covered Annually your prescription benefits, but Medicare does not cover unless Medically needed    Zoster (Not covered by Medicare Part B) No orders found for this or any previous visit.  This may be covered with your pharmacy  prescription benefits     Recommended Website

## 2021-06-09 DIAGNOSIS — E55.9 VITAMIN D DEFICIENCY: ICD-10-CM

## 2021-06-10 RX ORDER — ERGOCALCIFEROL 1.25 MG/1
50000 CAPSULE ORAL WEEKLY
Qty: 12 CAPSULE | Refills: 0 | Status: SHIPPED | OUTPATIENT
Start: 2021-06-10 | End: 2021-08-27

## 2021-06-22 DIAGNOSIS — I10 ESSENTIAL HYPERTENSION: ICD-10-CM

## 2021-06-22 RX ORDER — AMLODIPINE BESYLATE AND BENAZEPRIL HYDROCHLORIDE 10; 40 MG/1; MG/1
1 CAPSULE ORAL DAILY
Qty: 90 CAPSULE | Refills: 3 | Status: SHIPPED | OUTPATIENT
Start: 2021-06-22

## 2021-06-22 NOTE — TELEPHONE ENCOUNTER
Per pharmacy, pt requesting a refill on the following medication;    •  amLODIPine Besy-Benazepril HCl 10-40 MG Oral Cap, Take 1 capsule by mouth daily. , Disp: 90 capsule, Rfl: 3

## 2021-07-16 DIAGNOSIS — E78.00 HIGH CHOLESTEROL: ICD-10-CM

## 2021-07-18 RX ORDER — SIMVASTATIN 20 MG
TABLET ORAL
Qty: 90 TABLET | Refills: 1 | Status: SHIPPED | OUTPATIENT
Start: 2021-07-18 | End: 2022-01-11

## 2021-08-30 ENCOUNTER — OFFICE VISIT (OUTPATIENT)
Dept: FAMILY MEDICINE CLINIC | Facility: CLINIC | Age: 71
End: 2021-08-30
Payer: COMMERCIAL

## 2021-08-30 ENCOUNTER — TELEPHONE (OUTPATIENT)
Dept: FAMILY MEDICINE CLINIC | Facility: CLINIC | Age: 71
End: 2021-08-30

## 2021-08-30 VITALS
WEIGHT: 168 LBS | SYSTOLIC BLOOD PRESSURE: 143 MMHG | HEART RATE: 62 BPM | BODY MASS INDEX: 24.88 KG/M2 | HEIGHT: 69 IN | TEMPERATURE: 98 F | DIASTOLIC BLOOD PRESSURE: 59 MMHG

## 2021-08-30 DIAGNOSIS — Z12.83 SKIN EXAM, SCREENING FOR CANCER: ICD-10-CM

## 2021-08-30 DIAGNOSIS — M65.331 TRIGGER MIDDLE FINGER OF RIGHT HAND: Primary | ICD-10-CM

## 2021-08-30 DIAGNOSIS — M48.061 SPINAL STENOSIS OF LUMBAR REGION, UNSPECIFIED WHETHER NEUROGENIC CLAUDICATION PRESENT: ICD-10-CM

## 2021-08-30 DIAGNOSIS — I83.891 SYMPTOMATIC VARICOSE VEINS, RIGHT: ICD-10-CM

## 2021-08-30 DIAGNOSIS — M79.672 LEFT FOOT PAIN: ICD-10-CM

## 2021-08-30 DIAGNOSIS — Z85.828 HISTORY OF BASAL CELL CARCINOMA (BCC): ICD-10-CM

## 2021-08-30 DIAGNOSIS — L98.9 SKIN LESION: ICD-10-CM

## 2021-08-30 DIAGNOSIS — L30.9 DERMATITIS: ICD-10-CM

## 2021-08-30 PROCEDURE — 3078F DIAST BP <80 MM HG: CPT | Performed by: FAMILY MEDICINE

## 2021-08-30 PROCEDURE — 3008F BODY MASS INDEX DOCD: CPT | Performed by: FAMILY MEDICINE

## 2021-08-30 PROCEDURE — 99214 OFFICE O/P EST MOD 30 MIN: CPT | Performed by: FAMILY MEDICINE

## 2021-08-30 PROCEDURE — 3077F SYST BP >= 140 MM HG: CPT | Performed by: FAMILY MEDICINE

## 2021-08-30 RX ORDER — CLOTRIMAZOLE AND BETAMETHASONE DIPROPIONATE 10; .64 MG/G; MG/G
CREAM TOPICAL 2 TIMES DAILY
COMMUNITY

## 2021-08-30 RX ORDER — CLOTRIMAZOLE AND BETAMETHASONE DIPROPIONATE 10; .64 MG/G; MG/G
CREAM TOPICAL 2 TIMES DAILY
Refills: 0 | Status: CANCELLED | OUTPATIENT
Start: 2021-08-30

## 2021-08-30 RX ORDER — CLOTRIMAZOLE AND BETAMETHASONE DIPROPIONATE 10; .64 MG/G; MG/G
1 CREAM TOPICAL 2 TIMES DAILY PRN
Qty: 60 G | Refills: 2 | Status: SHIPPED | OUTPATIENT
Start: 2021-08-30

## 2021-08-30 RX ORDER — HYDROCODONE BITARTRATE AND ACETAMINOPHEN 10; 325 MG/1; MG/1
1 TABLET ORAL EVERY 8 HOURS PRN
Qty: 60 TABLET | Refills: 0 | Status: SHIPPED | OUTPATIENT
Start: 2021-08-30

## 2021-08-30 NOTE — TELEPHONE ENCOUNTER
Bindu Bee from [facility] stated they received an order for compression stockings and that is something that is not in their product line. Thank you.

## 2021-08-30 NOTE — TELEPHONE ENCOUNTER
Left message for patient, please provide [address] information. DME order changed to [address] since [facility] does not supply compression stocking. Order faxed. [address].

## 2021-08-30 NOTE — PROGRESS NOTES
Patient presents with: Follow - Up: finger locking   Varicose Veins: right leg    HPI:   Bj Lewis is a 79year old male who presents to clinic for follow-up. Is starting to have stiffening of his right middle finger again.   Saw orthopedic surgery f middle finger of right hand  - ORTHOPEDIC - INTERNAL    3. Skin lesion  - DERM - INTERNAL    4. Skin exam, screening for cancer  - DERM - INTERNAL    5. History of basal cell carcinoma (BCC)  - DERM - INTERNAL    6.  Dermatitis  - clotrimazole-betamethasone

## 2021-09-02 ENCOUNTER — OFFICE VISIT (OUTPATIENT)
Dept: PAIN CLINIC | Facility: HOSPITAL | Age: 71
End: 2021-09-02
Attending: NURSE PRACTITIONER
Payer: MEDICARE

## 2021-09-02 VITALS — SYSTOLIC BLOOD PRESSURE: 121 MMHG | OXYGEN SATURATION: 99 % | DIASTOLIC BLOOD PRESSURE: 66 MMHG | HEART RATE: 68 BPM

## 2021-09-02 DIAGNOSIS — M47.816 FACET ARTHRITIS OF LUMBAR REGION: Primary | ICD-10-CM

## 2021-09-02 DIAGNOSIS — M51.26 HNP (HERNIATED NUCLEUS PULPOSUS), LUMBAR: ICD-10-CM

## 2021-09-02 PROCEDURE — 99211 OFF/OP EST MAY X REQ PHY/QHP: CPT

## 2021-09-02 RX ORDER — IBUPROFEN 800 MG/1
800 TABLET ORAL EVERY 8 HOURS PRN
Qty: 30 TABLET | Refills: 0 | Status: SHIPPED | OUTPATIENT
Start: 2021-09-02

## 2021-09-02 NOTE — PROGRESS NOTES
Pt presents to [facility] ambulatory for low back pain. Pt states pain starts at the low back mainly at the right side. Pt denies having any radiating pain.  Pain is ost often after sitting for a long period of time and trying to stand up again, or when bending do

## 2021-09-02 NOTE — CHRONIC PAIN
Follow-up Note  CC: right side low back pain   HISTORY OF PRESENT ILLNESS:  Fernie Prieto is a 79year old old male, originally referred to the pain clinic by  No ref.  provider found, with history of Facet arthritis of lumbar region  (primary encounter Reported on 10/9/2020) 90 capsule 0        REVIEW OF SYSTEMS:   Bowel/Bladder Incontinence: as above  Coughing/sneezing/straining does not exacerbate the pain.   Numbness/tingling: as above  Weakness: as above  Weight Loss: Negative   Fever: Negative   Card Skin cancer, Surgically removed- no othe treatment        FAMILY HISTORY:  Family History   Problem Relation Age of Onset   • Hypertension Mother    • Thyroid disease Mother    • Heart Disease Mother 80           • Cancer Sister         uterine cancer. Transportation (Non-Medical):   Physical Activity:       Days of Exercise per Week:       Minutes of Exercise per Session:   Stress:       Feeling of Stress :   Social Connections:       Frequency of Communication with Friends and Family:       Frequency o buttock pain.       TECHNIQUE: A variety of imaging planes and parameters were utilized for visualization of suspected pathology.       FINDINGS:   NUMERATION: For the purposes of this examination, the lowest fully formed disc space is designated as L5-S1. lumbar spine as detailed. Moshe Hove has been mild interval progression (particularly at L5-S1 and L3-L4) as compared with prior exam in December, 2012.  Notable levels as follows:       2.  L5-S1:  Left subarticular zone disc protrusion/extrusion, which result discussion. All questions were answered during extended questions and answer session. Patient agreeable to discussion plan.  Greater than 50% of the time was spent with counseling (nature of discussion centered around pain, therapy, and treatment options),

## 2021-09-08 ENCOUNTER — DOCUMENTATION ONLY (OUTPATIENT)
Dept: PAIN CLINIC | Facility: HOSPITAL | Age: 71
End: 2021-09-08

## 2021-09-08 NOTE — PROGRESS NOTES
Procedure code 71060---64/13/2021 approved  Follow up appt  09/27/2021 at 10 am        [facility] [address] from [employer] # [id number]

## 2021-09-13 ENCOUNTER — SURGERY CENTER DOCUMENTATION (OUTPATIENT)
Dept: SURGERY | Age: 71
End: 2021-09-13

## 2021-09-13 NOTE — PROCEDURES
Iker Colby.            Patient: Kelechi Larios  MR #: [id number]  : 1950        Operative Report        Date of procedure: 2021    Preoperative diagnosis: Right radiculopathy DDD    Postop diagnosis:Right radiculopathy DD by:   Joshua Shine.  Chinyere Smith MD

## 2021-10-18 NOTE — TELEPHONE ENCOUNTER
14 days yes.  Qd Called and lvm to schedule fasting lab appt before any future refills can be authorized.

## 2021-11-04 ENCOUNTER — OFFICE VISIT (OUTPATIENT)
Dept: PAIN CLINIC | Facility: HOSPITAL | Age: 71
End: 2021-11-04
Attending: NURSE PRACTITIONER
Payer: MEDICARE

## 2021-11-04 VITALS — HEART RATE: 74 BPM | OXYGEN SATURATION: 97 % | SYSTOLIC BLOOD PRESSURE: 138 MMHG | DIASTOLIC BLOOD PRESSURE: 74 MMHG

## 2021-11-04 DIAGNOSIS — M47.816 FACET ARTHRITIS OF LUMBAR REGION: Primary | ICD-10-CM

## 2021-11-04 DIAGNOSIS — M51.26 HNP (HERNIATED NUCLEUS PULPOSUS), LUMBAR: ICD-10-CM

## 2021-11-04 PROCEDURE — 99211 OFF/OP EST MAY X REQ PHY/QHP: CPT

## 2021-11-04 NOTE — CHRONIC PAIN
Follow-up Note  CC: injection follow up   HISTORY OF PRESENT ILLNESS:  Abdelrahman Lindsay is a 79year old old male, originally referred to the pain clinic by Dr. Elder ref.  provider found, with history of Facet arthritis of lumbar region  (primary encounter diag 10/9/2020) 90 capsule 0        REVIEW OF SYSTEMS:   Bowel/Bladder Incontinence: as above  Coughing/sneezing/straining does not exacerbate the pain.   Numbness/tingling: as above  Weakness: as above  Weight Loss: Negative   Fever: Negative   Cardiovascular: cancer, Surgically removed- no othe treatment        FAMILY HISTORY:  Family History   Problem Relation Age of Onset   • Hypertension Mother    • Thyroid disease Mother    • Heart Disease Mother 80           • Cancer Sister         uterine cancer.    • (Medical): Not on file      Lack of Transportation (Non-Medical):  Not on file  Physical Activity:       Days of Exercise per Week: Not on file      Minutes of Exercise per Session: Not on file  Stress:       Feeling of Stress : Not on file  Social Connecti 5/5   Foot EHL 5/5 5/5   Gastrocnemius 5/5 5/5   Edema - Absent  Skin - normal     IMAGING:  Narrative   PROCEDURE: MRI SPINE LUMBAR (CPT=72148)       COMPARISON: [address], 12/21/2007, 4:53 PM. [facility] protrusion/extrusion in addition to bilateral facet arthropathy.  No spinal canal stenosis.  Moderate left lateral recess stenosis with effacement of the traversing left   S1 nerve root.  Additional moderate right neural foraminal stenosis without signific neuropathic medications, oral steroids, analgesics), injections, and further testing. Risks and benefits of all options were discussed at length to patients satisfaction during a comprehensive interactive discussion.  All questions were answered during exte

## 2021-11-04 NOTE — PROGRESS NOTES
PT presents ambulatory to the [facility].  Pt reports almost 100% relief. Some pain with activity. ZEE Chauhan saw PT for R Trans F/U. See notes for POC.

## 2022-01-11 ENCOUNTER — TELEPHONE (OUTPATIENT)
Dept: CASE MANAGEMENT | Age: 72
End: 2022-01-11

## 2022-01-11 DIAGNOSIS — E78.00 HIGH CHOLESTEROL: ICD-10-CM

## 2022-01-11 RX ORDER — SIMVASTATIN 20 MG
TABLET ORAL
Qty: 90 TABLET | Refills: 1 | Status: SHIPPED | OUTPATIENT
Start: 2022-01-11 | End: 2022-07-11

## 2022-01-11 NOTE — TELEPHONE ENCOUNTER
Patient is eligible for a 2022 Medicare Annual Wellness visit. This visit can be scheduled any time in the calendar year. Language Line  Mary Barahona [phone] left msg to call back.

## 2022-01-11 NOTE — TELEPHONE ENCOUNTER
Refill passed per [facility] protocol.   Requested Prescriptions   Pending Prescriptions Disp Refills    SIMVASTATIN 20 MG Oral Tab [Pharmacy Med Name: SIMVASTATIN 20MG TABLETS] 90 tablet 1     Sig: TAKE 1 TABLET BY MOUTH EVERY NIGHT        Cholesterol Medication Protocol Passed - 1/11/2022  6:04 AM        Passed - ALT in past 12 months        Passed - LDL in past 12 months        Passed - Last ALT < 80       Lab Results   Component Value Date    ALT 35 04/12/2021             Passed - Last LDL < 130     Lab Results   Component Value Date    LDL 62 04/12/2021               Passed - Appointment in past 12 or next 3 months             Recent Outpatient Visits              2 months ago Facet arthritis of lumbar region    [facility]    Office Visit    4 months ago Facet arthritis of lumbar region    [facility]    Office Visit    4 months ago Trigger middle finger of right hand    Royer Mesa MD    Office Visit    4 months ago Trigger middle finger of right hand    Romana Reardon MD    Office Visit    9 months ago Encounter for annual health examination    Romana Reardon MD    Office Visit

## 2022-02-14 NOTE — PROGRESS NOTES
Called patient LVM offering Dr. Hodges appt @ [facility]    HPI    Patient presents for shingles follow up. Was seen on 8/15 and given valacyclovir to take for one week as well as gabapentin. Still with lots of pain. Rash still present but now crusted over. Unable to sleep at night from pain.       Review of Sy Marital status:       Spouse name: Not on file      Number of children: Not on file      Years of education: Not on file      Highest education level: Not on file    Occupational History      Not on file    Social Needs      Financial resource s Outdoor occupation: Not Asked        Reaction to local anesthetic: No    Social History Narrative      Not on file      Current Outpatient Medications   Medication Sig Dispense Refill   • HYDROcodone-acetaminophen 5-325 MG Oral Tab Take 1 tablet by m HYDROcodone-acetaminophen 5-325 MG Oral Tab        Continue gabapentin tid. Norco as needed for severe pain and to sleep at night. Discussed plan of care with patient and patient is in agreement. All questions answered.  Patient to call with question

## 2022-02-22 ENCOUNTER — OFFICE VISIT (OUTPATIENT)
Dept: FAMILY MEDICINE CLINIC | Facility: CLINIC | Age: 72
End: 2022-02-22
Payer: MEDICARE

## 2022-02-22 VITALS
HEIGHT: 69 IN | BODY MASS INDEX: 25.92 KG/M2 | TEMPERATURE: 98 F | SYSTOLIC BLOOD PRESSURE: 137 MMHG | HEART RATE: 71 BPM | WEIGHT: 175 LBS | DIASTOLIC BLOOD PRESSURE: 70 MMHG

## 2022-02-22 DIAGNOSIS — Z00.00 ENCOUNTER FOR ANNUAL HEALTH EXAMINATION: ICD-10-CM

## 2022-02-22 DIAGNOSIS — Z85.828 HISTORY OF BASAL CELL CARCINOMA (BCC): ICD-10-CM

## 2022-02-22 DIAGNOSIS — H90.3 SENSORINEURAL HEARING LOSS, BILATERAL: ICD-10-CM

## 2022-02-22 DIAGNOSIS — M47.816 FACET ARTHRITIS OF LUMBAR REGION: ICD-10-CM

## 2022-02-22 DIAGNOSIS — M51.26 DISPLACEMENT OF LUMBAR INTERVERTEBRAL DISC: ICD-10-CM

## 2022-02-22 DIAGNOSIS — M65.331 TRIGGER MIDDLE FINGER OF RIGHT HAND: ICD-10-CM

## 2022-02-22 DIAGNOSIS — E79.0 HYPERURICEMIA: ICD-10-CM

## 2022-02-22 DIAGNOSIS — G47.30 SLEEP APNEA, UNSPECIFIED TYPE: ICD-10-CM

## 2022-02-22 DIAGNOSIS — R73.03 PREDIABETES: ICD-10-CM

## 2022-02-22 DIAGNOSIS — Z87.39 HISTORY OF GOUT: ICD-10-CM

## 2022-02-22 DIAGNOSIS — Z23 NEED FOR PNEUMOCOCCAL VACCINE: ICD-10-CM

## 2022-02-22 DIAGNOSIS — I10 ESSENTIAL HYPERTENSION: Chronic | ICD-10-CM

## 2022-02-22 DIAGNOSIS — M48.061 SPINAL STENOSIS OF LUMBAR REGION, UNSPECIFIED WHETHER NEUROGENIC CLAUDICATION PRESENT: Primary | ICD-10-CM

## 2022-02-22 DIAGNOSIS — M46.96 UNSPECIFIED INFLAMMATORY SPONDYLOPATHY, LUMBAR REGION (HCC): ICD-10-CM

## 2022-02-22 DIAGNOSIS — C44.701: ICD-10-CM

## 2022-02-22 DIAGNOSIS — Z23 NEED FOR SHINGLES VACCINE: ICD-10-CM

## 2022-02-22 PROCEDURE — 3008F BODY MASS INDEX DOCD: CPT | Performed by: FAMILY MEDICINE

## 2022-02-22 PROCEDURE — G0439 PPPS, SUBSEQ VISIT: HCPCS | Performed by: FAMILY MEDICINE

## 2022-02-22 PROCEDURE — 96160 PT-FOCUSED HLTH RISK ASSMT: CPT | Performed by: FAMILY MEDICINE

## 2022-02-22 PROCEDURE — 99397 PER PM REEVAL EST PAT 65+ YR: CPT | Performed by: FAMILY MEDICINE

## 2022-02-22 PROCEDURE — 3075F SYST BP GE 130 - 139MM HG: CPT | Performed by: FAMILY MEDICINE

## 2022-02-22 PROCEDURE — G0009 ADMIN PNEUMOCOCCAL VACCINE: HCPCS | Performed by: FAMILY MEDICINE

## 2022-02-22 PROCEDURE — 3078F DIAST BP <80 MM HG: CPT | Performed by: FAMILY MEDICINE

## 2022-02-22 PROCEDURE — 90732 PPSV23 VACC 2 YRS+ SUBQ/IM: CPT | Performed by: FAMILY MEDICINE

## 2022-02-22 RX ORDER — ZOSTER VACCINE RECOMBINANT, ADJUVANTED 50 MCG/0.5
0.5 KIT INTRAMUSCULAR ONCE
Qty: 1 EACH | Refills: 0 | Status: SHIPPED | OUTPATIENT
Start: 2022-02-22 | End: 2022-02-22

## 2022-02-22 RX ORDER — IBUPROFEN 800 MG/1
800 TABLET ORAL EVERY 8 HOURS PRN
Qty: 90 TABLET | Refills: 0 | Status: SHIPPED | OUTPATIENT
Start: 2022-02-22

## 2022-02-25 ENCOUNTER — TELEPHONE (OUTPATIENT)
Dept: FAMILY MEDICINE CLINIC | Facility: CLINIC | Age: 72
End: 2022-02-25

## 2022-02-26 LAB
ABSOLUTE BASOPHILS: 62 CELLS/UL (ref 0–200)
ABSOLUTE EOSINOPHILS: 228 CELLS/UL (ref 15–500)
ABSOLUTE LYMPHOCYTES: 2436 CELLS/UL (ref 850–3900)
ABSOLUTE MONOCYTES: 552 CELLS/UL (ref 200–950)
ABSOLUTE NEUTROPHILS: 3623 CELLS/UL (ref 1500–7800)
ALBUMIN/GLOBULIN RATIO: 1.7 (CALC) (ref 1–2.5)
ALBUMIN: 4.3 G/DL (ref 3.6–5.1)
ALKALINE PHOSPHATASE: 44 U/L (ref 35–144)
ALT: 26 U/L (ref 9–46)
AST: 24 U/L (ref 10–35)
BILIRUBIN, TOTAL: 0.5 MG/DL (ref 0.2–1.2)
BUN: 17 MG/DL (ref 7–25)
CALCIUM: 9.1 MG/DL (ref 8.6–10.3)
CARBON DIOXIDE: 28 MMOL/L (ref 20–32)
CHLORIDE: 104 MMOL/L (ref 98–110)
CHOL/HDLC RATIO: 2.7 (CALC)
CHOLESTEROL, TOTAL: 148 MG/DL
CREATININE: 0.96 MG/DL (ref 0.7–1.18)
EGFR IF AFRICN AM: 92 ML/MIN/1.73M2
EGFR IF NONAFRICN AM: 79 ML/MIN/1.73M2
EOSINOPHILS: 3.3 %
GLOBULIN: 2.6 G/DL (CALC) (ref 1.9–3.7)
GLUCOSE: 86 MG/DL (ref 65–99)
HDL CHOLESTEROL: 55 MG/DL
HEMATOCRIT: 39.5 % (ref 38.5–50)
HEMOGLOBIN A1C: 5.4 % OF TOTAL HGB
HEMOGLOBIN: 13.3 G/DL (ref 13.2–17.1)
LDL-CHOLESTEROL: 71 MG/DL (CALC)
LYMPHOCYTES: 35.3 %
MCH: 30.7 PG (ref 27–33)
MCHC: 33.7 G/DL (ref 32–36)
MCV: 91.2 FL (ref 80–100)
MPV: 9.9 FL (ref 7.5–12.5)
NEUTROPHILS: 52.5 %
NON-HDL CHOLESTEROL: 93 MG/DL (CALC)
PLATELET COUNT: 269 THOUSAND/UL (ref 140–400)
POTASSIUM: 4.3 MMOL/L (ref 3.5–5.3)
PROTEIN, TOTAL: 6.9 G/DL (ref 6.1–8.1)
RDW: 12.4 % (ref 11–15)
RED BLOOD CELL COUNT: 4.33 MILLION/UL (ref 4.2–5.8)
SODIUM: 139 MMOL/L (ref 135–146)
TRIGLYCERIDES: 140 MG/DL
TSH W/REFLEX TO FT4: 1.46 MIU/L (ref 0.4–4.5)
WHITE BLOOD CELL COUNT: 6.9 THOUSAND/UL (ref 3.8–10.8)

## 2022-04-01 ENCOUNTER — TELEPHONE (OUTPATIENT)
Dept: GASTROENTEROLOGY | Facility: CLINIC | Age: 72
End: 2022-04-01

## 2022-04-01 NOTE — TELEPHONE ENCOUNTER
----- Message from Moe Hernandez RN sent at 2017  1:32 PM CDT -----  Regardin year colon recall  5 year recall colonoscopy with Dr. Tanya Correia.

## 2022-05-06 ENCOUNTER — TELEPHONE (OUTPATIENT)
Dept: GASTROENTEROLOGY | Facility: CLINIC | Age: 72
End: 2022-05-06

## 2022-05-06 DIAGNOSIS — Z86.010 PERSONAL HISTORY OF COLONIC POLYPS: Primary | ICD-10-CM

## 2022-05-09 RX ORDER — UBIDECARENONE 75 MG
250 CAPSULE ORAL DAILY
COMMUNITY

## 2022-05-09 NOTE — TELEPHONE ENCOUNTER
Ian Rdz    Patient called to schedule 5 year recall colonoscopy. Please provide orders if appropriate. Thank you    Last Procedure, Date, MD:  Colonoscopy Dr. Naomie Mendoza 5/19/2017  Last Diagnosis:  Posterior anal fissure, colon polyp x1, early diverticulosis  Recalled for (mth/yrs): 5 years  Sedation used previously:  IV  Last Prep Used (if known):  Miralax  Quality of prep (if known): good  Anticoagulants: no  Diabetic Meds: no  BP meds(Ace inhibitors/ARB's):  amlodipine-benazepril  Weight loss meds (phentermine/vyvanse): no  Iron supplement (RX/OTC): no  Height & Weight/BMI: 5'7\" 175lbs BMI 25.83  Hx of Cardiac/CVA issues/(MI/Stroke): no  Devices Pacemaker/Defibrillator/Stents: no  Resp. Issues/Oxygen Use/BLAINE/COPD: no  Issues w/Anesthesia: no    Symptoms (Y/N): no  Symptoms Details: no    Special comments/notes:  used for call ID [phone]    Please advise on orders and prep, thank you.

## 2022-05-10 RX ORDER — POLYETHYLENE GLYCOL 3350, SODIUM CHLORIDE, SODIUM BICARBONATE, POTASSIUM CHLORIDE 420; 11.2; 5.72; 1.48 G/4L; G/4L; G/4L; G/4L
POWDER, FOR SOLUTION ORAL
Qty: 4000 ML | Refills: 0 | Status: SHIPPED | OUTPATIENT
Start: 2022-05-10

## 2022-05-19 NOTE — TELEPHONE ENCOUNTER
Scheduled for:  Colonoscopy 06104  Provider Name:  Dr. Rj Carrasquillo  Date:  8/22/22  Location:    [facility]  Sedation:  MAC  Time:  9867 (pt is aware to arrive at 1030)   Prep:  Trilyte, mailed on 5/19/22  Meds/Allergies Reconciled?:  Physician reviewed   Diagnosis with codes:  History of colon polyps Z86.010  Was patient informed to call insurance with codes (Y/N):  Yes, I confirmed Medicare/[employer] HMO insurance with the patient. Referral sent?:  Referral was sent at the time of electronic surgical scheduling. [facility] or [address] notified?:  I sent an electronic request to Endo Scheduling and received a confirmation today. Medication Orders: This patient verbally confirmed that he is not taking:   Iron, blood thinners and is not diabetic   Not latex allergy, Not PCN allergy and does not have a pacemaker   This patient is aware to HOLD his BP meds (Amlodipine-Benazepril--AM) the day of the procedure     This patient is aware to HOLD all supplements x 7 days before the procedure. Misc Orders:       Further instructions given by staff:    This patient had no questions regarding the prep instructions

## 2022-06-08 DIAGNOSIS — I10 ESSENTIAL HYPERTENSION: ICD-10-CM

## 2022-06-08 RX ORDER — AMLODIPINE BESYLATE AND BENAZEPRIL HYDROCHLORIDE 10; 40 MG/1; MG/1
1 CAPSULE ORAL DAILY
Qty: 90 CAPSULE | Refills: 1 | Status: SHIPPED | OUTPATIENT
Start: 2022-06-08

## 2022-06-08 NOTE — TELEPHONE ENCOUNTER
Refill passed per [facility] protocol. Requested Prescriptions   Pending Prescriptions Disp Refills    AMLODIPINE BESY-BENAZEPRIL HCL 10-40 MG Oral Cap [Pharmacy Med Name: AMLODIPINE-BENAZ 10/40MG CAPSULES] 90 capsule 3     Sig: Take 1 capsule by mouth daily.         Hypertensive Medications Protocol Passed - 6/8/2022 11:18 AM        Passed - CMP or BMP in past 12 months        Passed - Appointment in past 6 or next 3 months        Passed - GFR Non- > 50     Lab Results   Component Value Date    GFRNAA 79 02/25/2022                        Recent Outpatient Visits              3 months ago Spinal stenosis of lumbar region, unspecified whether neurogenic claudication present    [facility], [address], Francisco Nicholas MD    Office Visit    7 months ago Facet arthritis of lumbar region    [facility]    Office Visit    9 months ago Facet arthritis of lumbar region    [facility]    Office Visit    9 months ago Trigger middle finger of right hand    Viki Monreal MD    Office Visit    9 months ago Trigger middle finger of right hand    [address] Francisco Aguilar MD    Office Visit             Future Appointments         Provider Department Appt Notes    In 2 months [facility], PROCEDURE [address] GI PROCEDURE Colon @ [facility]

## 2022-07-10 DIAGNOSIS — E78.00 HIGH CHOLESTEROL: ICD-10-CM

## 2022-07-11 RX ORDER — SIMVASTATIN 20 MG
TABLET ORAL
Qty: 90 TABLET | Refills: 1 | Status: SHIPPED | OUTPATIENT
Start: 2022-07-11

## 2022-08-22 ENCOUNTER — HOSPITAL ENCOUNTER (OUTPATIENT)
Age: 72
Setting detail: HOSPITAL OUTPATIENT SURGERY
Discharge: HOME | End: 2022-08-22
Attending: INTERNAL MEDICINE | Admitting: INTERNAL MEDICINE
Payer: MEDICARE

## 2022-08-22 ENCOUNTER — ANESTHESIA EVENT (OUTPATIENT)
Dept: ENDOSCOPY | Age: 72
End: 2022-08-22
Payer: MEDICARE

## 2022-08-22 ENCOUNTER — ANESTHESIA (OUTPATIENT)
Dept: ENDOSCOPY | Age: 72
End: 2022-08-22
Payer: MEDICARE

## 2022-08-22 VITALS
WEIGHT: 175 LBS | RESPIRATION RATE: 19 BRPM | HEART RATE: 68 BPM | SYSTOLIC BLOOD PRESSURE: 107 MMHG | DIASTOLIC BLOOD PRESSURE: 63 MMHG | TEMPERATURE: 98 F | BODY MASS INDEX: 25.92 KG/M2 | OXYGEN SATURATION: 96 % | HEIGHT: 69 IN

## 2022-08-22 DIAGNOSIS — Z86.010 PERSONAL HISTORY OF COLONIC POLYPS: ICD-10-CM

## 2022-08-22 RX ORDER — SODIUM CHLORIDE, SODIUM LACTATE, POTASSIUM CHLORIDE, CALCIUM CHLORIDE 600; 310; 30; 20 MG/100ML; MG/100ML; MG/100ML; MG/100ML
INJECTION, SOLUTION INTRAVENOUS CONTINUOUS
Status: DISCONTINUED | OUTPATIENT
Start: 2022-08-22 | End: 2022-08-22

## 2022-08-22 RX ADMIN — SODIUM CHLORIDE, SODIUM LACTATE, POTASSIUM CHLORIDE, CALCIUM CHLORIDE: 600; 310; 30; 20 INJECTION, SOLUTION INTRAVENOUS at 10:37:00

## 2022-08-22 RX ADMIN — SODIUM CHLORIDE, SODIUM LACTATE, POTASSIUM CHLORIDE, CALCIUM CHLORIDE: 600; 310; 30; 20 INJECTION, SOLUTION INTRAVENOUS at 11:03:00

## 2022-08-22 NOTE — OPERATIVE REPORT
[facility] Endoscopy Report      Preoperative Diagnosis:  - history of colon polyps       Postoperative Diagnosis:  - internal hemorrhoids  - early diverticular disease      Procedure:    Colonoscopy         Surgeon:  Bobby Pierce M.D. Anesthesia:  MAC     Technique:  After informed consent, the patient was placed in the left lateral recumbent position. Digital rectal examination revealed no palpable intraluminal abnormalities. An Olympus variable stiffness 190 series HD colonoscope was inserted into the rectum and advanced under direct vision by following the lumen to the cecum. The colon was examined upon withdrawal in the left lateral position. The procedure was well tolerated without immediate complication. Findings:  The preparation of the colon was good. The terminal ileum was examined for 4 cm and visually normal.  The ileocecal valve was well preserved. The visualized colonic mucosa from the cecum to the anal verge was normal with an intact vascular pattern. Dimpling of the colon wall in the sigmoid colon consistent with early diverticulosis. Small internal hemorrhoids noted on retroflexed view. Estimated blood loss-none  Specimens-none      Impression:  - internal hemorrhoids  - early diverticular disease    Recommendations:  - Post procedure instructions given  - Repeat colonoscopy in 7  - High fiber diet for diverticular disease  - Symptomatic treatment of hemorrhoids          Edy Leep.  Michael Agudelo MD  8/22/2022  11:05 AM

## 2022-08-24 ENCOUNTER — TELEPHONE (OUTPATIENT)
Dept: GASTROENTEROLOGY | Facility: CLINIC | Age: 72
End: 2022-08-24

## 2022-08-24 NOTE — TELEPHONE ENCOUNTER
Health maintenance updated. 7 year colonoscopy recall entered into patient outreach in [address] [place] [address] Rd. Next colonoscopy will be due 8/22/2029.

## 2022-09-07 DIAGNOSIS — M48.061 SPINAL STENOSIS OF LUMBAR REGION, UNSPECIFIED WHETHER NEUROGENIC CLAUDICATION PRESENT: ICD-10-CM

## 2022-09-07 DIAGNOSIS — Z00.00 ENCOUNTER FOR ANNUAL HEALTH EXAMINATION: ICD-10-CM

## 2022-09-08 RX ORDER — IBUPROFEN 800 MG/1
800 TABLET ORAL EVERY 8 HOURS PRN
Qty: 90 TABLET | Refills: 0 | Status: SHIPPED | OUTPATIENT
Start: 2022-09-08

## 2022-09-08 NOTE — TELEPHONE ENCOUNTER
Please review. Protocol failed / No protocol.     Requested Prescriptions   Pending Prescriptions Disp Refills    IBUPROFEN 800 MG Oral Tab [Pharmacy Med Name: IBUPROFEN 800MG TABLETS] 90 tablet 0     Sig: TAKE 1 TABLET(800 MG) BY MOUTH EVERY 8 HOURS AS NEEDED FOR PAIN        Non-Narcotic Pain Medication Protocol Failed - 9/7/2022  9:47 AM        Failed - In person appointment or virtual visit in the past 6 mos or appointment in next 3 mos       Recent Outpatient Visits              6 months ago Spinal stenosis of lumbar region, unspecified whether neurogenic claudication present    [facility], [address], Maxwell Masters MD    Office Visit    10 months ago Facet arthritis of lumbar region    [facility] [place] [facility]    Office Visit    1 year ago Facet arthritis of lumbar region    [facility]    Office Visit    1 year ago Trigger middle finger of right hand    [facility] - Benjamín Davis MD    Office Visit    1 year ago Trigger middle finger of right hand    [facility], [address], Maxwell Masters MD    Office Visit                        Recent Outpatient Visits              6 months ago Spinal stenosis of lumbar region, unspecified whether neurogenic claudication present    [facility], [address], Maxwell Masters MD    Office Visit    10 months ago Facet arthritis of lumbar region    [facility] [place] [facility]    Office Visit    1 year ago Facet arthritis of lumbar region    [facility]    Office Visit    1 year ago Trigger middle finger of right hand    Jovany Antoine MD    Office Visit    1 year ago Trigger middle finger of right hand    [facility], [address], Maxwell Masters MD    Office Visit

## 2022-11-01 ENCOUNTER — OFFICE VISIT (OUTPATIENT)
Dept: FAMILY MEDICINE CLINIC | Facility: CLINIC | Age: 72
End: 2022-11-01
Payer: MEDICARE

## 2022-11-01 VITALS
WEIGHT: 173 LBS | SYSTOLIC BLOOD PRESSURE: 130 MMHG | HEIGHT: 69 IN | BODY MASS INDEX: 25.62 KG/M2 | HEART RATE: 68 BPM | DIASTOLIC BLOOD PRESSURE: 78 MMHG | TEMPERATURE: 98 F

## 2022-11-01 DIAGNOSIS — I10 ESSENTIAL HYPERTENSION: ICD-10-CM

## 2022-11-01 DIAGNOSIS — Z00.00 ENCOUNTER FOR ANNUAL HEALTH EXAMINATION: ICD-10-CM

## 2022-11-01 DIAGNOSIS — M65.331 TRIGGER MIDDLE FINGER OF RIGHT HAND: ICD-10-CM

## 2022-11-01 DIAGNOSIS — M48.061 SPINAL STENOSIS OF LUMBAR REGION, UNSPECIFIED WHETHER NEUROGENIC CLAUDICATION PRESENT: ICD-10-CM

## 2022-11-01 DIAGNOSIS — M65.332 TRIGGER MIDDLE FINGER OF LEFT HAND: Primary | ICD-10-CM

## 2022-11-01 DIAGNOSIS — E78.00 HIGH CHOLESTEROL: ICD-10-CM

## 2022-11-01 PROCEDURE — 3078F DIAST BP <80 MM HG: CPT | Performed by: FAMILY MEDICINE

## 2022-11-01 PROCEDURE — 3008F BODY MASS INDEX DOCD: CPT | Performed by: FAMILY MEDICINE

## 2022-11-01 PROCEDURE — 3075F SYST BP GE 130 - 139MM HG: CPT | Performed by: FAMILY MEDICINE

## 2022-11-01 PROCEDURE — 1126F AMNT PAIN NOTED NONE PRSNT: CPT | Performed by: FAMILY MEDICINE

## 2022-11-01 PROCEDURE — 99214 OFFICE O/P EST MOD 30 MIN: CPT | Performed by: FAMILY MEDICINE

## 2022-11-01 RX ORDER — AMLODIPINE BESYLATE AND BENAZEPRIL HYDROCHLORIDE 10; 40 MG/1; MG/1
1 CAPSULE ORAL DAILY
Qty: 90 CAPSULE | Refills: 1 | Status: SHIPPED | OUTPATIENT
Start: 2022-11-01

## 2022-11-01 RX ORDER — IBUPROFEN 800 MG/1
800 TABLET ORAL EVERY 8 HOURS PRN
Qty: 90 TABLET | Refills: 0 | Status: SHIPPED | OUTPATIENT
Start: 2022-11-01

## 2022-11-01 RX ORDER — SIMVASTATIN 20 MG
20 TABLET ORAL NIGHTLY
Qty: 90 TABLET | Refills: 1 | Status: SHIPPED | OUTPATIENT
Start: 2022-11-01

## 2022-11-01 RX ORDER — CLOTRIMAZOLE AND BETAMETHASONE DIPROPIONATE 10; .64 MG/G; MG/G
1 CREAM TOPICAL 2 TIMES DAILY
Qty: 45 G | Refills: 0 | Status: SHIPPED | OUTPATIENT
Start: 2022-11-01

## 2022-11-02 ENCOUNTER — TELEPHONE (OUTPATIENT)
Dept: ORTHOPEDICS CLINIC | Facility: CLINIC | Age: 72
End: 2022-11-02

## 2022-11-02 NOTE — TELEPHONE ENCOUNTER
NP Trigger middle finger of left hand   Trigger middle finger of right hand. Please advise ig imaging is needed.   Future Appointments   Date Time Provider Carlton Porter   11/22/2022  1:00 PM Jessica Andrade MD [facility]

## 2022-11-03 ENCOUNTER — TELEPHONE (OUTPATIENT)
Dept: FAMILY MEDICINE CLINIC | Facility: CLINIC | Age: 72
End: 2022-11-03

## 2022-11-03 DIAGNOSIS — K64.9 HEMORRHOIDS, UNSPECIFIED HEMORRHOID TYPE: Primary | ICD-10-CM

## 2022-11-03 RX ORDER — LIDOCAINE 50 MG/1
1 SUPPOSITORY RECTAL DAILY PRN
Qty: 14 SUPPOSITORY | Refills: 0 | Status: SHIPPED | OUTPATIENT
Start: 2022-11-03 | End: 2022-11-17

## 2022-11-03 NOTE — TELEPHONE ENCOUNTER
He has been prescribed hydrocortisone external cream in the past.  We will also prescribe lidocaine suppository which will help numb the pain. Hemorrhoids, unspecified hemorrhoid type  - hydrocortisone 2.5 % External Cream; Apply 1 Application topically 2 (two) times daily. Dispense: 28 g; Refill: 1  - Lidocaine, Anorectal, 50 MG Rectal Suppos; Place 1 each rectally daily as needed. Dispense: 14 suppository;  Refill: 0

## 2022-11-04 NOTE — TELEPHONE ENCOUNTER
1. Hemorrhoids, unspecified hemorrhoid type  - Lidocaine HCl 0.5 % External Gel; Apply 1 Application topically daily.   Dispense: 237 g; Refill: 0

## 2022-11-05 RX ORDER — PRAMOXINE HYDROCHLORIDE 10 MG/G
1 AEROSOL, FOAM TOPICAL EVERY 2 HOUR PRN
Qty: 15 G | Refills: 0 | Status: SHIPPED | OUTPATIENT
Start: 2022-11-05

## 2022-11-05 NOTE — TELEPHONE ENCOUNTER
I placed the order for the foam but as stated may not be covered. Should schedule with PCP to decided if needs referral for surgery - if thrombosed hemorrhoids vs pain from fissure.

## 2022-11-06 ENCOUNTER — HOSPITAL ENCOUNTER (OUTPATIENT)
Age: 72
Discharge: ANOTHER HOSPITAL | End: 2022-11-06
Payer: MEDICARE

## 2022-11-06 ENCOUNTER — HOSPITAL ENCOUNTER (EMERGENCY)
Facility: HOSPITAL | Age: 72
Discharge: HOME | End: 2022-11-06
Attending: STUDENT IN AN ORGANIZED HEALTH CARE EDUCATION/TRAINING PROGRAM
Payer: MEDICARE

## 2022-11-06 ENCOUNTER — APPOINTMENT (OUTPATIENT)
Dept: CT IMAGING | Facility: HOSPITAL | Age: 72
End: 2022-11-06
Attending: STUDENT IN AN ORGANIZED HEALTH CARE EDUCATION/TRAINING PROGRAM
Payer: MEDICARE

## 2022-11-06 VITALS
OXYGEN SATURATION: 94 % | BODY MASS INDEX: 25.48 KG/M2 | RESPIRATION RATE: 18 BRPM | TEMPERATURE: 100 F | SYSTOLIC BLOOD PRESSURE: 139 MMHG | HEART RATE: 90 BPM | HEIGHT: 69 IN | DIASTOLIC BLOOD PRESSURE: 61 MMHG | WEIGHT: 172 LBS

## 2022-11-06 VITALS
SYSTOLIC BLOOD PRESSURE: 158 MMHG | RESPIRATION RATE: 20 BRPM | OXYGEN SATURATION: 100 % | HEART RATE: 98 BPM | DIASTOLIC BLOOD PRESSURE: 68 MMHG | TEMPERATURE: 98 F

## 2022-11-06 DIAGNOSIS — K61.0 PERIANAL CELLULITIS: Primary | ICD-10-CM

## 2022-11-06 DIAGNOSIS — K62.89 ANAL OR RECTAL PAIN: Primary | ICD-10-CM

## 2022-11-06 LAB
ANION GAP SERPL CALC-SCNC: 5 MMOL/L (ref 0–18)
BASOPHILS # BLD AUTO: 0.08 X10(3) UL (ref 0–0.2)
BASOPHILS NFR BLD AUTO: 0.5 %
BUN BLD-MCNC: 11 MG/DL (ref 7–18)
BUN/CREAT SERPL: 11 (ref 10–20)
CALCIUM BLD-MCNC: 9.1 MG/DL (ref 8.5–10.1)
CHLORIDE SERPL-SCNC: 102 MMOL/L (ref 98–112)
CO2 SERPL-SCNC: 26 MMOL/L (ref 21–32)
CREAT BLD-MCNC: 1 MG/DL
DEPRECATED RDW RBC AUTO: 42.7 FL (ref 35.1–46.3)
EOSINOPHIL # BLD AUTO: 0.04 X10(3) UL (ref 0–0.7)
EOSINOPHIL NFR BLD AUTO: 0.3 %
ERYTHROCYTE [DISTWIDTH] IN BLOOD BY AUTOMATED COUNT: 12.8 % (ref 11–15)
GFR SERPLBLD BASED ON 1.73 SQ M-ARVRAT: 80 ML/MIN/1.73M2 (ref 60–?)
GLUCOSE BLD-MCNC: 87 MG/DL (ref 70–99)
HCT VFR BLD AUTO: 40.9 %
HGB BLD-MCNC: 13.5 G/DL
IMM GRANULOCYTES # BLD AUTO: 0.08 X10(3) UL (ref 0–1)
IMM GRANULOCYTES NFR BLD: 0.5 %
LYMPHOCYTES # BLD AUTO: 1.92 X10(3) UL (ref 1–4)
LYMPHOCYTES NFR BLD AUTO: 13.2 %
MCH RBC QN AUTO: 30.1 PG (ref 26–34)
MCHC RBC AUTO-ENTMCNC: 33 G/DL (ref 31–37)
MCV RBC AUTO: 91.3 FL
MONOCYTES # BLD AUTO: 1.05 X10(3) UL (ref 0.1–1)
MONOCYTES NFR BLD AUTO: 7.2 %
NEUTROPHILS # BLD AUTO: 11.42 X10 (3) UL (ref 1.5–7.7)
NEUTROPHILS # BLD AUTO: 11.42 X10(3) UL (ref 1.5–7.7)
NEUTROPHILS NFR BLD AUTO: 78.3 %
OSMOLALITY SERPL CALC.SUM OF ELEC: 275 MOSM/KG (ref 275–295)
PLATELET # BLD AUTO: 294 10(3)UL (ref 150–450)
POTASSIUM SERPL-SCNC: 4.6 MMOL/L (ref 3.5–5.1)
RBC # BLD AUTO: 4.48 X10(6)UL
SODIUM SERPL-SCNC: 133 MMOL/L (ref 136–145)
WBC # BLD AUTO: 14.6 X10(3) UL (ref 4–11)

## 2022-11-06 PROCEDURE — 99284 EMERGENCY DEPT VISIT MOD MDM: CPT

## 2022-11-06 PROCEDURE — 72193 CT PELVIS W/DYE: CPT | Performed by: STUDENT IN AN ORGANIZED HEALTH CARE EDUCATION/TRAINING PROGRAM

## 2022-11-06 PROCEDURE — 85025 COMPLETE CBC W/AUTO DIFF WBC: CPT | Performed by: STUDENT IN AN ORGANIZED HEALTH CARE EDUCATION/TRAINING PROGRAM

## 2022-11-06 PROCEDURE — 99214 OFFICE O/P EST MOD 30 MIN: CPT | Performed by: NURSE PRACTITIONER

## 2022-11-06 PROCEDURE — 80048 BASIC METABOLIC PNL TOTAL CA: CPT | Performed by: STUDENT IN AN ORGANIZED HEALTH CARE EDUCATION/TRAINING PROGRAM

## 2022-11-06 PROCEDURE — 96374 THER/PROPH/DIAG INJ IV PUSH: CPT

## 2022-11-06 RX ORDER — KETOROLAC TROMETHAMINE 15 MG/ML
15 INJECTION, SOLUTION INTRAMUSCULAR; INTRAVENOUS ONCE
Status: DISCONTINUED | OUTPATIENT
Start: 2022-11-06 | End: 2022-11-06

## 2022-11-06 RX ORDER — KETOROLAC TROMETHAMINE 15 MG/ML
15 INJECTION, SOLUTION INTRAMUSCULAR; INTRAVENOUS ONCE
Status: COMPLETED | OUTPATIENT
Start: 2022-11-06 | End: 2022-11-06

## 2022-11-06 RX ORDER — AMOXICILLIN AND CLAVULANATE POTASSIUM 875; 125 MG/1; MG/1
1 TABLET, FILM COATED ORAL 3 TIMES DAILY
Qty: 30 TABLET | Refills: 0 | Status: SHIPPED | OUTPATIENT
Start: 2022-11-06 | End: 2022-11-08

## 2022-11-06 RX ORDER — HYDROCODONE BITARTRATE AND ACETAMINOPHEN 5; 325 MG/1; MG/1
1-2 TABLET ORAL EVERY 6 HOURS PRN
Qty: 10 TABLET | Refills: 0 | Status: SHIPPED | OUTPATIENT
Start: 2022-11-06 | End: 2022-11-11

## 2022-11-06 RX ORDER — ACETAMINOPHEN 500 MG
1000 TABLET ORAL ONCE
Status: COMPLETED | OUTPATIENT
Start: 2022-11-06 | End: 2022-11-06

## 2022-11-06 NOTE — ED INITIAL ASSESSMENT (HPI)
Pt c/o anal pain x 3 days, hx hemorrhoids states pcp sent medications to pharmacy but one was in back order and the other medication was not covered by insurance.

## 2022-11-07 NOTE — DISCHARGE INSTRUCTIONS
In the emergency department for your rectal pain. Your CT scan did not show any signs of a drainable fluid abscess but findings were consistent with soft tissue infection. We will plan to treat with course of oral antibiotics as well as pain medications. Please follow-up closely with your primary care provider in 2 to 3 days to ensure continued improvement of symptoms. If symptoms worsen, he may require outpatient follow-up with general surgery for drainage should you develop an abscess. Contact information is provided above. Follow-up with general surgery only if needed. Return to the emergency department if you have worsening fevers, nausea or vomiting, worsening pain or swelling that is not controlled with medications at home.

## 2022-11-07 NOTE — TELEPHONE ENCOUNTER
Spoke to patient and he said he went to immediate care and then was sent to ER from the immediate care. It was not a hemorrhoid. He was told to follow up with his PCP.  I transferred him to schedule an ER follow up with his PCP

## 2022-11-08 ENCOUNTER — OFFICE VISIT (OUTPATIENT)
Dept: FAMILY MEDICINE CLINIC | Facility: CLINIC | Age: 72
End: 2022-11-08
Payer: MEDICARE

## 2022-11-08 ENCOUNTER — TELEPHONE (OUTPATIENT)
Dept: FAMILY MEDICINE CLINIC | Facility: CLINIC | Age: 72
End: 2022-11-08

## 2022-11-08 VITALS
HEIGHT: 69 IN | WEIGHT: 171.38 LBS | TEMPERATURE: 97 F | DIASTOLIC BLOOD PRESSURE: 68 MMHG | HEART RATE: 89 BPM | SYSTOLIC BLOOD PRESSURE: 138 MMHG | BODY MASS INDEX: 25.38 KG/M2

## 2022-11-08 DIAGNOSIS — K61.0 PERIANAL ABSCESS: Primary | ICD-10-CM

## 2022-11-08 PROCEDURE — 3078F DIAST BP <80 MM HG: CPT | Performed by: FAMILY MEDICINE

## 2022-11-08 PROCEDURE — 1125F AMNT PAIN NOTED PAIN PRSNT: CPT | Performed by: FAMILY MEDICINE

## 2022-11-08 PROCEDURE — 3075F SYST BP GE 130 - 139MM HG: CPT | Performed by: FAMILY MEDICINE

## 2022-11-08 PROCEDURE — 3008F BODY MASS INDEX DOCD: CPT | Performed by: FAMILY MEDICINE

## 2022-11-08 PROCEDURE — 99214 OFFICE O/P EST MOD 30 MIN: CPT | Performed by: FAMILY MEDICINE

## 2022-11-08 RX ORDER — AMOXICILLIN AND CLAVULANATE POTASSIUM 875; 125 MG/1; MG/1
1 TABLET, FILM COATED ORAL 3 TIMES DAILY
Qty: 30 TABLET | Refills: 0 | Status: SHIPPED | OUTPATIENT
Start: 2022-11-08 | End: 2022-11-09

## 2022-11-08 RX ORDER — HYDROCODONE BITARTRATE AND ACETAMINOPHEN 10; 325 MG/1; MG/1
1 TABLET ORAL EVERY 6 HOURS PRN
Qty: 30 TABLET | Refills: 0 | Status: SHIPPED | OUTPATIENT
Start: 2022-11-08

## 2022-11-08 NOTE — TELEPHONE ENCOUNTER
Patient called to inform he made an appointment with a surgeon, but that the soonest appointment available is until 11/22/22. Was told to contact Dr. Adria Jones and ask if he can see a surgeon outside the group or at a different hospital since there is nothing sooner available.

## 2022-11-08 NOTE — TELEPHONE ENCOUNTER
Please route to gen surg nursing pool if possible - would like him to see any of the general surgeons asap for perirectal abscess/phlegmon seen on ct scan.

## 2022-11-09 ENCOUNTER — OFFICE VISIT (OUTPATIENT)
Facility: LOCATION | Age: 72
End: 2022-11-09
Payer: MEDICARE

## 2022-11-09 DIAGNOSIS — K61.0 PERIANAL CELLULITIS: Primary | ICD-10-CM

## 2022-11-09 RX ORDER — METRONIDAZOLE 500 MG/1
500 TABLET ORAL 3 TIMES DAILY
Qty: 21 TABLET | Refills: 0 | Status: SHIPPED | OUTPATIENT
Start: 2022-11-09 | End: 2022-11-16

## 2022-11-09 RX ORDER — CIPROFLOXACIN 500 MG/1
500 TABLET, FILM COATED ORAL 2 TIMES DAILY
Qty: 14 TABLET | Refills: 0 | Status: SHIPPED | OUTPATIENT
Start: 2022-11-09 | End: 2022-11-16

## 2022-11-17 ENCOUNTER — OFFICE VISIT (OUTPATIENT)
Facility: LOCATION | Age: 72
End: 2022-11-17
Payer: MEDICARE

## 2022-11-17 VITALS — HEART RATE: 81 BPM | TEMPERATURE: 98 F

## 2022-11-17 DIAGNOSIS — K61.0 PERIANAL CELLULITIS: Primary | ICD-10-CM

## 2022-11-17 PROCEDURE — 99212 OFFICE O/P EST SF 10 MIN: CPT | Performed by: SURGERY

## 2022-11-22 ENCOUNTER — OFFICE VISIT (OUTPATIENT)
Dept: ORTHOPEDICS CLINIC | Facility: CLINIC | Age: 72
End: 2022-11-22
Payer: MEDICARE

## 2022-11-22 VITALS — WEIGHT: 171 LBS | HEIGHT: 69 IN | BODY MASS INDEX: 25.33 KG/M2

## 2022-11-22 DIAGNOSIS — M65.332 TRIGGER MIDDLE FINGER OF LEFT HAND: Primary | ICD-10-CM

## 2022-11-22 PROCEDURE — 3008F BODY MASS INDEX DOCD: CPT | Performed by: ORTHOPAEDIC SURGERY

## 2022-11-22 PROCEDURE — 20550 NJX 1 TENDON SHEATH/LIGAMENT: CPT | Performed by: ORTHOPAEDIC SURGERY

## 2022-11-22 PROCEDURE — 1126F AMNT PAIN NOTED NONE PRSNT: CPT | Performed by: ORTHOPAEDIC SURGERY

## 2022-11-22 PROCEDURE — 99203 OFFICE O/P NEW LOW 30 MIN: CPT | Performed by: ORTHOPAEDIC SURGERY

## 2022-11-22 RX ORDER — TRIAMCINOLONE ACETONIDE 40 MG/ML
40 INJECTION, SUSPENSION INTRA-ARTICULAR; INTRAMUSCULAR ONCE
Status: COMPLETED | OUTPATIENT
Start: 2022-11-22 | End: 2022-11-22

## 2022-11-22 RX ADMIN — TRIAMCINOLONE ACETONIDE 40 MG: 40 INJECTION, SUSPENSION INTRA-ARTICULAR; INTRAMUSCULAR at 13:26:00

## 2022-12-12 DIAGNOSIS — I10 ESSENTIAL HYPERTENSION: ICD-10-CM

## 2022-12-12 RX ORDER — AMLODIPINE BESYLATE AND BENAZEPRIL HYDROCHLORIDE 10; 40 MG/1; MG/1
1 CAPSULE ORAL DAILY
Qty: 90 CAPSULE | Refills: 1 | OUTPATIENT
Start: 2022-12-12

## 2022-12-15 ENCOUNTER — OFFICE VISIT (OUTPATIENT)
Dept: FAMILY MEDICINE CLINIC | Facility: CLINIC | Age: 72
End: 2022-12-15
Payer: MEDICARE

## 2022-12-15 VITALS
HEIGHT: 69 IN | DIASTOLIC BLOOD PRESSURE: 74 MMHG | BODY MASS INDEX: 25.33 KG/M2 | SYSTOLIC BLOOD PRESSURE: 132 MMHG | WEIGHT: 171 LBS | HEART RATE: 94 BPM | TEMPERATURE: 98 F

## 2022-12-15 DIAGNOSIS — G47.00 INSOMNIA, UNSPECIFIED TYPE: Primary | ICD-10-CM

## 2022-12-15 DIAGNOSIS — K61.0 PERIANAL ABSCESS: ICD-10-CM

## 2022-12-15 DIAGNOSIS — K61.0 PERIANAL CELLULITIS: ICD-10-CM

## 2022-12-15 PROCEDURE — 99214 OFFICE O/P EST MOD 30 MIN: CPT | Performed by: FAMILY MEDICINE

## 2022-12-15 PROCEDURE — 3008F BODY MASS INDEX DOCD: CPT | Performed by: FAMILY MEDICINE

## 2022-12-15 PROCEDURE — 3078F DIAST BP <80 MM HG: CPT | Performed by: FAMILY MEDICINE

## 2022-12-15 PROCEDURE — 1126F AMNT PAIN NOTED NONE PRSNT: CPT | Performed by: FAMILY MEDICINE

## 2022-12-15 PROCEDURE — 3075F SYST BP GE 130 - 139MM HG: CPT | Performed by: FAMILY MEDICINE

## 2022-12-15 RX ORDER — DIAZEPAM 2 MG/1
2 TABLET ORAL NIGHTLY PRN
Qty: 10 TABLET | Refills: 0 | Status: SHIPPED | OUTPATIENT
Start: 2022-12-15

## 2022-12-20 ENCOUNTER — OFFICE VISIT (OUTPATIENT)
Dept: SURGERY | Facility: CLINIC | Age: 72
End: 2022-12-20
Payer: MEDICARE

## 2022-12-20 VITALS — BODY MASS INDEX: 25.33 KG/M2 | WEIGHT: 171 LBS | HEIGHT: 69 IN

## 2022-12-20 DIAGNOSIS — K61.0 ANAL ABSCESS: Primary | ICD-10-CM

## 2022-12-20 PROCEDURE — 99203 OFFICE O/P NEW LOW 30 MIN: CPT | Performed by: SURGERY

## 2022-12-20 PROCEDURE — 3008F BODY MASS INDEX DOCD: CPT | Performed by: SURGERY

## 2023-03-13 ENCOUNTER — LAB ENCOUNTER (OUTPATIENT)
Dept: LAB | Age: 73
End: 2023-03-13
Attending: FAMILY MEDICINE
Payer: MEDICARE

## 2023-03-13 ENCOUNTER — OFFICE VISIT (OUTPATIENT)
Dept: FAMILY MEDICINE CLINIC | Facility: CLINIC | Age: 73
End: 2023-03-13

## 2023-03-13 VITALS
DIASTOLIC BLOOD PRESSURE: 73 MMHG | WEIGHT: 176 LBS | HEART RATE: 81 BPM | SYSTOLIC BLOOD PRESSURE: 160 MMHG | BODY MASS INDEX: 26.07 KG/M2 | HEIGHT: 69 IN

## 2023-03-13 DIAGNOSIS — R73.03 PREDIABETES: ICD-10-CM

## 2023-03-13 DIAGNOSIS — M54.50 ACUTE LEFT-SIDED LOW BACK PAIN, UNSPECIFIED WHETHER SCIATICA PRESENT: ICD-10-CM

## 2023-03-13 DIAGNOSIS — M48.061 SPINAL STENOSIS OF LUMBAR REGION, UNSPECIFIED WHETHER NEUROGENIC CLAUDICATION PRESENT: ICD-10-CM

## 2023-03-13 DIAGNOSIS — Z00.00 ENCOUNTER FOR ANNUAL HEALTH EXAMINATION: ICD-10-CM

## 2023-03-13 DIAGNOSIS — G47.30 SLEEP APNEA, UNSPECIFIED TYPE: ICD-10-CM

## 2023-03-13 DIAGNOSIS — H53.8 VISION BLURRING: ICD-10-CM

## 2023-03-13 DIAGNOSIS — I10 ESSENTIAL HYPERTENSION: Chronic | ICD-10-CM

## 2023-03-13 DIAGNOSIS — Z97.3 WEARS GLASSES: ICD-10-CM

## 2023-03-13 DIAGNOSIS — M47.816 FACET ARTHRITIS OF LUMBAR REGION: ICD-10-CM

## 2023-03-13 DIAGNOSIS — Z87.39 HISTORY OF GOUT: ICD-10-CM

## 2023-03-13 DIAGNOSIS — M51.26 DISPLACEMENT OF LUMBAR INTERVERTEBRAL DISC: ICD-10-CM

## 2023-03-13 DIAGNOSIS — Z85.828 HISTORY OF BASAL CELL CARCINOMA (BCC): ICD-10-CM

## 2023-03-13 DIAGNOSIS — H61.22 IMPACTED CERUMEN OF LEFT EAR: ICD-10-CM

## 2023-03-13 DIAGNOSIS — E79.0 HYPERURICEMIA: ICD-10-CM

## 2023-03-13 DIAGNOSIS — Z00.00 ENCOUNTER FOR ANNUAL HEALTH EXAMINATION: Primary | ICD-10-CM

## 2023-03-13 DIAGNOSIS — H93.13 TINNITUS OF BOTH EARS: ICD-10-CM

## 2023-03-13 DIAGNOSIS — E78.2 MIXED HYPERLIPIDEMIA: ICD-10-CM

## 2023-03-13 DIAGNOSIS — H90.3 SENSORINEURAL HEARING LOSS, BILATERAL: ICD-10-CM

## 2023-03-13 DIAGNOSIS — M65.331 TRIGGER MIDDLE FINGER OF RIGHT HAND: ICD-10-CM

## 2023-03-13 DIAGNOSIS — M46.96 UNSPECIFIED INFLAMMATORY SPONDYLOPATHY, LUMBAR REGION (HCC): ICD-10-CM

## 2023-03-13 DIAGNOSIS — C44.701: ICD-10-CM

## 2023-03-13 PROBLEM — K61.0 PERIANAL CELLULITIS: Status: RESOLVED | Noted: 2022-11-09 | Resolved: 2023-03-13

## 2023-03-13 LAB
ALBUMIN SERPL-MCNC: 3.9 G/DL (ref 3.4–5)
ALBUMIN/GLOB SERPL: 1 {RATIO} (ref 1–2)
ALP LIVER SERPL-CCNC: 53 U/L
ALT SERPL-CCNC: 38 U/L
ANION GAP SERPL CALC-SCNC: 7 MMOL/L (ref 0–18)
AST SERPL-CCNC: 30 U/L (ref 15–37)
BASOPHILS # BLD AUTO: 0.08 X10(3) UL (ref 0–0.2)
BASOPHILS NFR BLD AUTO: 1.2 %
BILIRUB SERPL-MCNC: 0.5 MG/DL (ref 0.1–2)
BUN BLD-MCNC: 16 MG/DL (ref 7–18)
BUN/CREAT SERPL: 15.1 (ref 10–20)
CALCIUM BLD-MCNC: 9.1 MG/DL (ref 8.5–10.1)
CHLORIDE SERPL-SCNC: 107 MMOL/L (ref 98–112)
CHOLEST SERPL-MCNC: 153 MG/DL (ref ?–200)
CO2 SERPL-SCNC: 28 MMOL/L (ref 21–32)
COMPLEXED PSA SERPL-MCNC: 2.91 NG/ML (ref ?–4)
CREAT BLD-MCNC: 1.06 MG/DL
DEPRECATED RDW RBC AUTO: 43.2 FL (ref 35.1–46.3)
EOSINOPHIL # BLD AUTO: 0.22 X10(3) UL (ref 0–0.7)
EOSINOPHIL NFR BLD AUTO: 3.3 %
ERYTHROCYTE [DISTWIDTH] IN BLOOD BY AUTOMATED COUNT: 12.8 % (ref 11–15)
EST. AVERAGE GLUCOSE BLD GHB EST-MCNC: 114 MG/DL (ref 68–126)
FASTING PATIENT LIPID ANSWER: YES
FASTING STATUS PATIENT QL REPORTED: YES
GFR SERPLBLD BASED ON 1.73 SQ M-ARVRAT: 75 ML/MIN/1.73M2 (ref 60–?)
GLOBULIN PLAS-MCNC: 3.9 G/DL (ref 2.8–4.4)
GLUCOSE BLD-MCNC: 96 MG/DL (ref 70–99)
HBA1C MFR BLD: 5.6 % (ref ?–5.7)
HCT VFR BLD AUTO: 41.3 %
HDLC SERPL-MCNC: 57 MG/DL (ref 40–59)
HGB BLD-MCNC: 13.9 G/DL
IMM GRANULOCYTES # BLD AUTO: 0.03 X10(3) UL (ref 0–1)
IMM GRANULOCYTES NFR BLD: 0.5 %
LDLC SERPL CALC-MCNC: 70 MG/DL (ref ?–100)
LYMPHOCYTES # BLD AUTO: 2.53 X10(3) UL (ref 1–4)
LYMPHOCYTES NFR BLD AUTO: 38 %
MCH RBC QN AUTO: 31 PG (ref 26–34)
MCHC RBC AUTO-ENTMCNC: 33.7 G/DL (ref 31–37)
MCV RBC AUTO: 92 FL
MONOCYTES # BLD AUTO: 0.51 X10(3) UL (ref 0.1–1)
MONOCYTES NFR BLD AUTO: 7.7 %
NEUTROPHILS # BLD AUTO: 3.29 X10 (3) UL (ref 1.5–7.7)
NEUTROPHILS # BLD AUTO: 3.29 X10(3) UL (ref 1.5–7.7)
NEUTROPHILS NFR BLD AUTO: 49.3 %
NONHDLC SERPL-MCNC: 96 MG/DL (ref ?–130)
OSMOLALITY SERPL CALC.SUM OF ELEC: 295 MOSM/KG (ref 275–295)
PLATELET # BLD AUTO: 273 10(3)UL (ref 150–450)
POTASSIUM SERPL-SCNC: 4.3 MMOL/L (ref 3.5–5.1)
PROT SERPL-MCNC: 7.8 G/DL (ref 6.4–8.2)
RBC # BLD AUTO: 4.49 X10(6)UL
SODIUM SERPL-SCNC: 142 MMOL/L (ref 136–145)
TRIGL SERPL-MCNC: 151 MG/DL (ref 30–149)
TSI SER-ACNC: 1.61 MIU/ML (ref 0.36–3.74)
VLDLC SERPL CALC-MCNC: 23 MG/DL (ref 0–30)
WBC # BLD AUTO: 6.7 X10(3) UL (ref 4–11)

## 2023-03-13 PROCEDURE — 84443 ASSAY THYROID STIM HORMONE: CPT | Performed by: FAMILY MEDICINE

## 2023-03-13 PROCEDURE — 80061 LIPID PANEL: CPT | Performed by: FAMILY MEDICINE

## 2023-03-13 PROCEDURE — 85025 COMPLETE CBC W/AUTO DIFF WBC: CPT | Performed by: FAMILY MEDICINE

## 2023-03-13 PROCEDURE — 80053 COMPREHEN METABOLIC PANEL: CPT | Performed by: FAMILY MEDICINE

## 2023-03-13 PROCEDURE — 83036 HEMOGLOBIN GLYCOSYLATED A1C: CPT | Performed by: FAMILY MEDICINE

## 2023-03-13 PROCEDURE — 36415 COLL VENOUS BLD VENIPUNCTURE: CPT | Performed by: FAMILY MEDICINE

## 2023-03-13 RX ORDER — LIDOCAINE 4 G/G
1 PATCH TOPICAL EVERY 24 HOURS
Qty: 15 PATCH | Refills: 0 | Status: SHIPPED | OUTPATIENT
Start: 2023-03-13

## 2023-03-13 RX ORDER — LEVOCETIRIZINE DIHYDROCHLORIDE 5 MG/1
5 TABLET, FILM COATED ORAL EVERY EVENING
Qty: 30 TABLET | Refills: 0 | Status: SHIPPED | OUTPATIENT
Start: 2023-03-13

## 2023-03-13 RX ORDER — LIDOCAINE 50 MG/G
1 PATCH TOPICAL EVERY 24 HOURS
Qty: 15 PATCH | Refills: 0 | Status: SHIPPED | OUTPATIENT
Start: 2023-03-13

## 2023-03-14 ENCOUNTER — TELEPHONE (OUTPATIENT)
Dept: FAMILY MEDICINE CLINIC | Facility: CLINIC | Age: 73
End: 2023-03-14

## 2023-03-16 ENCOUNTER — OFFICE VISIT (OUTPATIENT)
Dept: AUDIOLOGY | Facility: CLINIC | Age: 73
End: 2023-03-16

## 2023-03-16 ENCOUNTER — OFFICE VISIT (OUTPATIENT)
Dept: OTOLARYNGOLOGY | Facility: CLINIC | Age: 73
End: 2023-03-16

## 2023-03-16 DIAGNOSIS — H93.19 TINNITUS, UNSPECIFIED LATERALITY: Primary | ICD-10-CM

## 2023-03-16 DIAGNOSIS — H91.13 PRESBYCUSIS, BILATERAL: ICD-10-CM

## 2023-03-16 DIAGNOSIS — H90.3 SENSORINEURAL HEARING LOSS, BILATERAL: Primary | ICD-10-CM

## 2023-03-16 PROCEDURE — 92567 TYMPANOMETRY: CPT | Performed by: AUDIOLOGIST

## 2023-03-16 PROCEDURE — 92557 COMPREHENSIVE HEARING TEST: CPT | Performed by: AUDIOLOGIST

## 2023-03-16 NOTE — PATIENT INSTRUCTIONS
We reviewed your audiogram.  You have a moderate to severe bilateral sensorineural hearing loss which is symmetric. For the ringing you can reduce your sodium and caffeine. You can also try Lipo flavonoid 1 pill 3 times daily. Follow-up with any additional questions or problems.

## 2023-03-17 PROBLEM — M54.50 ACUTE LEFT-SIDED LOW BACK PAIN: Status: RESOLVED | Noted: 2023-03-13 | Resolved: 2023-03-17

## 2023-03-20 ENCOUNTER — TELEPHONE (OUTPATIENT)
Dept: FAMILY MEDICINE CLINIC | Facility: CLINIC | Age: 73
End: 2023-03-20

## 2023-03-20 DIAGNOSIS — M54.50 ACUTE BILATERAL LOW BACK PAIN WITHOUT SCIATICA: Primary | ICD-10-CM

## 2023-03-20 NOTE — TELEPHONE ENCOUNTER
Language line #[id number]  Assisted with the phone call. Would like dr's comments  On 03/13 test results. Pt also states that at the 03/13 appointment he relayed that he sustained a fall about 2 weeks prior. Pt states that since then, he has had increasing lower back pain. States that now he has pain all the time. He can only walk in short spurts and then has to sit down. He is taking 800mg ibuprofen sparingly. It helps for about 2-3 hours. He doesn't use it much as it constipates him. Pt is asking for xray orders for his back. Please advise.

## 2023-03-21 NOTE — TELEPHONE ENCOUNTER
RN called patient. Patient's date of birth and full name both confirmed. RN informed of provider's message as detailed below. All questions/concerns addressed. He verbalizes understanding of all information, and agreeable to plan. Denies additional questions. Language Line utilized.  ID # [id number], Lyndsay Menendez.

## 2023-03-21 NOTE — TELEPHONE ENCOUNTER
Please advise patient that all of his blood work was normal including liver, kidney function, blood counts, thyroid, prostate, diabetes screen and cholesterol. Can repeat test in 1 year. I have ordered an x-ray of his low back. Would advise using a heating pad or ice packs, lidocaine patches over-the-counter as well as ibuprofen. 1. Acute bilateral low back pain without sciatica    - XR LUMBAR SPINE (MIN 4 VIEWS) (CPT=72110);  Future

## 2023-03-22 ENCOUNTER — HOSPITAL ENCOUNTER (OUTPATIENT)
Dept: GENERAL RADIOLOGY | Age: 73
Discharge: HOME | End: 2023-03-22
Attending: FAMILY MEDICINE
Payer: MEDICARE

## 2023-03-22 DIAGNOSIS — M54.50 ACUTE BILATERAL LOW BACK PAIN WITHOUT SCIATICA: ICD-10-CM

## 2023-03-22 PROCEDURE — 72110 X-RAY EXAM L-2 SPINE 4/>VWS: CPT | Performed by: FAMILY MEDICINE

## 2023-03-30 ENCOUNTER — TELEPHONE (OUTPATIENT)
Dept: FAMILY MEDICINE CLINIC | Facility: CLINIC | Age: 73
End: 2023-03-30

## 2023-03-30 DIAGNOSIS — G89.29 CHRONIC BILATERAL LOW BACK PAIN WITHOUT SCIATICA: Primary | ICD-10-CM

## 2023-03-30 DIAGNOSIS — M54.50 CHRONIC BILATERAL LOW BACK PAIN WITHOUT SCIATICA: Primary | ICD-10-CM

## 2023-03-30 DIAGNOSIS — M48.061 SPINAL STENOSIS OF LUMBAR REGION, UNSPECIFIED WHETHER NEUROGENIC CLAUDICATION PRESENT: ICD-10-CM

## 2023-03-30 DIAGNOSIS — M47.816 FACET ARTHRITIS OF LUMBAR REGION: ICD-10-CM

## 2023-03-30 DIAGNOSIS — M51.26 DISPLACEMENT OF LUMBAR INTERVERTEBRAL DISC: ICD-10-CM

## 2023-03-30 NOTE — TELEPHONE ENCOUNTER
Attempted to call patient, no answer. Please let him know that his x-ray shows some arthritic changes but we know that he has history of a disc herniation in the low back for which he has seen neurosurgery in the past.  Can establish care with physiatry, will renew his referral not sure that he ever saw them for nonsurgical management of his low back pain. Otherwise his blood work is normal including blood counts, liver, kidney, thyroid, prostate function. Normal cholesterol and no diabetes. Thank you for following up with patient    1. Chronic bilateral low back pain without sciatica  - PHYSIATRY - INTERNAL    2. Facet arthritis of lumbar region  - PHYSIATRY - INTERNAL    3. Spinal stenosis of lumbar region, unspecified whether neurogenic claudication present  - PHYSIATRY - INTERNAL    4.  Displacement of lumbar intervertebral disc  - PHYSIATRY - INTERNAL History of Present Illness


Consult date: 06/11/20


Reason for consult: abdominal pain


Requesting physician: DESMOND JOYNER


Chief complaint: 





abdominal pain





- History of present illness


History of present illness: 





65yo M with multiple medical issues presents from the primary care physician's 

office with complaints of abdominal pain, nausea and vomiting.  Evaluation 

emergency department revealed a small bowel obstruction.  General surgery was 

consulted.





Patient reports that for the last few days he has had difficulty with crampy 

abdominal pain, nausea and vomiting.  He has been passing small amounts of 

flatus and stool.  He passed flatus and stool yesterday.  He went to his primary

care's office for evaluation.  They recommended that he come to the emergency 

room for further evaluation.  Now that he is received some medicine and the NG 

tube, he is feeling much better.  Pain has resolved.





Patient surgical history is significant for an open aortic aneurysm repair at 

[place] approximately 4 years ago.  In January 2020, he had surgery in [place] to

place a penile implant that was subsequently moved and a left inguinal hernia 

repair (open) was performed.  There were no complications.





Past History


Past Medical History: hypertension, PVD


Past Surgical History: abd. aortic aneurysm repair, hernia repair ([facility] (open)), 

Other (penile implant)


Social history: smoking.  denies: alcohol abuse


Family history: no significant family history





Medications and Allergies


                                    Allergies











Allergy/AdvReac Type Severity Reaction Status Date / Time


 


No Known Allergies Allergy   Unverified 11/10/18 09:51











                                Home Medications











 Medication  Instructions  Recorded  Confirmed  Last Taken  Type


 


Valsartan [Diovan] 80 mg PO DAILY 11/10/18 11/10/18 11/10/18 History











Active Meds: 


Active Medications





Potassium Chloride (Kcl 10meq/100ml)  10 meq in 100 mls @ 100 mls/hr IV Q1H LEO


   Stop: 06/11/20 19:59











Review of Systems





- Constitutional


weakness, no fever, no chills, no chronic pain





- Cardiovascular


no chest pain, no shortness of breath





- Respiratory


no cough





- Gastrointestinal


abdominal pain, nausea, vomiting, change in bowel habits





- Genitourinary


no dysuria





- Muskuloskeletal


no low back pain





- Integumentary


no rash, no sores, no wounds





Exam


                                   Vital Signs











Temp Pulse Resp BP Pulse Ox


 


 98.6 F   110 H  24   90/70   97 


 


 06/11/20 14:31  06/11/20 14:31  06/11/20 14:31  06/11/20 14:31  06/11/20 14:31














- General physical appearance


Positive: well developed, well nourished, no distress, no pain, other (pleasant,

 elderly gentleman)





- Eyes


Positive: normal occular movement





- Respiratory


Positive: normal expansion, normal respiratory effort, clear to auscultation





- Cardiovascular


Rhythm: regular





- Abdomen


Abdomen: Present: soft, bowel sounds hypoactive (fluid filled sounds), distended

 (minimal), surgical scars (well healed).  Absent: tender, guarding, rigid, 

wound


Hernia: none





- Integumentary


no rash, no growths, no abnormal pigmentation





- Neurologic


Neurologic: alert and oriented to time, place and person, motor strength and 

sensation are grossly intact





- Psychiatric


Psychiatric: appropriate mood/affect, intact judgment & insight, cooperative





Results





- Labs





                                 06/11/20 16:31





                                 06/11/20 16:31


                              Abnormal lab results











  06/11/20 06/11/20 06/11/20 Range/Units





  15:45 16:31 16:31 


 


Hgb   15.3 H   (11.8-15.2)  gm/dl


 


MCV   98 H   (84-94)  fl


 


MCH   34 H   (28-32)  pg


 


MCHC   35 H   (32-34)  %


 


Plt Count   132 L   (140-440)  K/mm3


 


PT     (12.2-14.9)  Sec.


 


INR     (0.87-1.13)  


 


Sodium    135 L  (137-145)  mmol/L


 


Potassium    3.0 L  (3.6-5.0)  mmol/L


 


Chloride    93.8 L  ()  mmol/L


 


BUN    46 H  (9-20)  mg/dL


 


Creatinine    2.3 H  (0.8-1.5)  mg/dL


 


Glucose    107 H  ()  mg/dL


 


Lactic Acid  0.40 L    (0.7-2.0)  mmol/L


 


Direct Bilirubin     (0-0.2)  mg/dL


 


Albumin     (3.9-5)  g/dL














  06/11/20 06/11/20 Range/Units





  16:31 16:31 


 


Hgb    (11.8-15.2)  gm/dl


 


MCV    (84-94)  fl


 


MCH    (28-32)  pg


 


MCHC    (32-34)  %


 


Plt Count    (140-440)  K/mm3


 


PT  17.5 H   (12.2-14.9)  Sec.


 


INR  1.47 H   (0.87-1.13)  


 


Sodium    (137-145)  mmol/L


 


Potassium    (3.6-5.0)  mmol/L


 


Chloride    ()  mmol/L


 


BUN    (9-20)  mg/dL


 


Creatinine    (0.8-1.5)  mg/dL


 


Glucose    ()  mg/dL


 


Lactic Acid    (0.7-2.0)  mmol/L


 


Direct Bilirubin   0.3 H  (0-0.2)  mg/dL


 


Albumin   3.7 L  (3.9-5)  g/dL








                                 Diabetes panel











  06/11/20 06/11/20 06/11/20 Range/Units





  15:45 16:31 16:31 


 


Sodium  TNR  135 L   


 


Potassium  TNR  3.0 L   


 


Chloride  TNR  93.8 L   


 


Carbon Dioxide  TNR  24   


 


BUN  TNR  46 H   


 


Creatinine  TNR  2.3 H   


 


Glucose  TNR  107 H   


 


Calcium  TNR  8.6   


 


AST  TNR   19  


 


ALT  TNR   12  


 


Alkaline Phosphatase  TNR   63  


 


Total Protein  TNR   6.9  


 


Albumin  TNR   3.7 L  








                                  Calcium panel











  06/11/20 06/11/20 06/11/20 Range/Units





  15:45 16:31 16:31 


 


Calcium  TNR  8.6   


 


Albumin  TNR   3.7 L  








                                 Pituitary panel











  06/11/20 06/11/20 Range/Units





  15:45 16:31 


 


Sodium  TNR  135 L  


 


Potassium  TNR  3.0 L  


 


Chloride  TNR  93.8 L  


 


Carbon Dioxide  TNR  24  


 


BUN  TNR  46 H  


 


Creatinine  TNR  2.3 H  


 


Glucose  TNR  107 H  


 


Calcium  TNR  8.6  








                                  Adrenal panel











  06/11/20 06/11/20 06/11/20 Range/Units





  15:45 16:31 16:31 


 


Sodium  TNR  135 L   


 


Potassium  TNR  3.0 L   


 


Chloride  TNR  93.8 L   


 


Carbon Dioxide  TNR  24   


 


BUN  TNR  46 H   


 


Creatinine  TNR  2.3 H   


 


Glucose  TNR  107 H   


 


Calcium  TNR  8.6   


 


Total Bilirubin  TNR   0.90  


 


AST  TNR   19  


 


ALT  TNR   12  


 


Alkaline Phosphatase  TNR   63  


 


Total Protein  TNR   6.9  


 


Albumin  TNR   3.7 L  














- Imaging


CT scan - abdomen: report reviewed, image reviewed


CT scan - pelvis: report reviewed, image reviewed





Assessment and Plan





- Patient Problems


(1) SBO (small bowel obstruction)


Current Visit: Yes   Status: Acute   


Plan to address problem: 


Pt stable.  He is currently asymptomatic.  Vital signs are normal.  The CT does 

have a concerning appearance, but his exam is benign at this time.  Therefore, 

we will take the opportunity to resuscitate him (BUN/Cr elevated compared to 

last check in 2018) and decompress the GI tract with the NG tube.  I advised him

 to let us know if he has worsening abdominal pain as this may necessitate us 

going for surgery.





Recommendations:





1. NG tube decompression and n.p.o.


2.  IV fluid resuscitation


3.  Abdominal x-ray in the morning


4.  Serial abdominal exams





We will follow along.  Please call with any questions.





Time=30min

## 2023-04-18 ENCOUNTER — TELEPHONE (OUTPATIENT)
Dept: CASE MANAGEMENT | Age: 73
End: 2023-04-18

## 2023-04-18 DIAGNOSIS — Z01.00 ENCOUNTER FOR COMPLETE EYE EXAM: Primary | ICD-10-CM

## 2023-04-18 NOTE — TELEPHONE ENCOUNTER
Dr. Carlos Manuel Barnhart,     The patient is requesting a referral for an eye exam for glasses. Pended referral please review diagnosis and sign off if you agree. Thank you.   Juan Shea

## 2023-07-26 ENCOUNTER — OFFICE VISIT (OUTPATIENT)
Dept: FAMILY MEDICINE CLINIC | Facility: CLINIC | Age: 73
End: 2023-07-26

## 2023-07-26 ENCOUNTER — TELEPHONE (OUTPATIENT)
Dept: ORTHOPEDICS CLINIC | Facility: CLINIC | Age: 73
End: 2023-07-26

## 2023-07-26 VITALS
BODY MASS INDEX: 26.07 KG/M2 | WEIGHT: 176 LBS | HEIGHT: 69 IN | DIASTOLIC BLOOD PRESSURE: 72 MMHG | SYSTOLIC BLOOD PRESSURE: 144 MMHG | HEART RATE: 74 BPM

## 2023-07-26 DIAGNOSIS — R05.1 ACUTE COUGH: Primary | ICD-10-CM

## 2023-07-26 DIAGNOSIS — J02.9 SORE THROAT: ICD-10-CM

## 2023-07-26 LAB
CONTROL LINE PRESENT WITH A CLEAR BACKGROUND (YES/NO): YES YES/NO
KIT LOT #: 6812 NUMERIC
STREP GRP A CUL-SCR: NEGATIVE

## 2023-07-26 PROCEDURE — 99213 OFFICE O/P EST LOW 20 MIN: CPT | Performed by: PHYSICIAN ASSISTANT

## 2023-07-26 PROCEDURE — 1126F AMNT PAIN NOTED NONE PRSNT: CPT | Performed by: PHYSICIAN ASSISTANT

## 2023-07-26 PROCEDURE — 3008F BODY MASS INDEX DOCD: CPT | Performed by: PHYSICIAN ASSISTANT

## 2023-07-26 PROCEDURE — 1159F MED LIST DOCD IN RCRD: CPT | Performed by: PHYSICIAN ASSISTANT

## 2023-07-26 PROCEDURE — 3078F DIAST BP <80 MM HG: CPT | Performed by: PHYSICIAN ASSISTANT

## 2023-07-26 PROCEDURE — 87880 STREP A ASSAY W/OPTIC: CPT | Performed by: PHYSICIAN ASSISTANT

## 2023-07-26 PROCEDURE — 3077F SYST BP >= 140 MM HG: CPT | Performed by: PHYSICIAN ASSISTANT

## 2023-07-26 RX ORDER — CODEINE PHOSPHATE AND GUAIFENESIN 10; 100 MG/5ML; MG/5ML
5 SOLUTION ORAL EVERY 6 HOURS PRN
Qty: 120 ML | Refills: 0 | Status: SHIPPED | OUTPATIENT
Start: 2023-07-26

## 2023-07-26 NOTE — TELEPHONE ENCOUNTER
Patient would like to be contacted regarding possibly scheduling sx due to his RT trigger finger. Patient last ov was 11/22/22, patient stated Dr. Cynthia Salcedo did advise not more injects to this finger. Please be advised.

## 2023-08-21 DIAGNOSIS — I10 ESSENTIAL HYPERTENSION: ICD-10-CM

## 2023-08-23 RX ORDER — AMLODIPINE AND BENAZEPRIL HYDROCHLORIDE 10; 40 MG/1; MG/1
1 CAPSULE ORAL DAILY
Qty: 90 CAPSULE | Refills: 3 | Status: SHIPPED | OUTPATIENT
Start: 2023-08-23

## 2023-08-23 NOTE — TELEPHONE ENCOUNTER
Please review; protocol failed/no protocol. Requested Prescriptions   Pending Prescriptions Disp Refills    AMLODIPINE BESY-BENAZEPRIL HCL 10-40 MG Oral Cap [Pharmacy Med Name: AMLODIPINE-BENAZ 10/40MG CAPSULES] 90 capsule 0     Sig: Take 1 capsule by mouth daily.        Hypertensive Medications Protocol Failed - 8/21/2023 12:31 PM        Failed - Last BP reading less than 140/90     BP Readings from Last 1 Encounters:  07/26/23 : 144/72              Passed - In person appointment in the past 12 or next 3 months     Recent Outpatient Visits              3 weeks ago Acute cough    [facility], [place], [place]    Office Visit    5 months ago Sensorineural hearing loss, bilateral    [facility], [address] [place] [address] Rd,[address], Madhu Ferreira    Office Visit    5 months ago Tinnitus, unspecified laterality    [facility], [address] [place] [address],[address], Mesha Kelly MD    Office Visit    5 months ago Encounter for annual health examination    [facility], Mu Saunders MD    Office Visit    8 months ago Anal abscess    [facility], Jalyn Mayorga MD    Office Visit          Future Appointments         Provider Department Appt Notes    In 3 weeks Camryn Hawkins MD Darla Miss, [address], [place] [facility] middle trigger finger    In 4 weeks Millie Page MD [facility], [address] [place] [address],[address], [facility] NP EE               Passed - CMP or BMP in past 6 months     Recent Results (from the past 4392 hour(s))   COMP METABOLIC PANEL (14)    Collection Time: 03/13/23  8:56 AM   Result Value Ref Range    Glucose 96 70 - 99 mg/dL    Sodium 142 136 - 145 mmol/L    Potassium 4.3 3.5 - 5.1 mmol/L    Chloride 107 98 - 112 mmol/L    CO2 28.0 21.0 - 32.0 mmol/L    Anion Gap 7 0 - 18 mmol/L    BUN 16 7 - 18 mg/dL    Creatinine 1.06 0.70 - 1.30 mg/dL BUN/CREA Ratio 15.1 10.0 - 20.0    Calcium, Total 9.1 8.5 - 10.1 mg/dL    Calculated Osmolality 295 275 - 295 mOsm/kg    eGFR-Cr 75 >=60 mL/min/1.73m2    ALT 38 16 - 61 U/L    AST 30 15 - 37 U/L    Alkaline Phosphatase 53 45 - 117 U/L    Bilirubin, Total 0.5 0.1 - 2.0 mg/dL    Total Protein 7.8 6.4 - 8.2 g/dL    Albumin 3.9 3.4 - 5.0 g/dL    Globulin  3.9 2.8 - 4.4 g/dL    A/G Ratio 1.0 1.0 - 2.0    Patient Fasting for CMP? Yes      *Note: Due to a large number of results and/or encounters for the requested time period, some results have not been displayed. A complete set of results can be found in Results Review.                Passed - In person appointment or virtual visit in the past 6 months     Recent Outpatient Visits              3 weeks ago Acute cough    [facility], [address], [place], [place]    Office Visit    5 months ago Sensorineural hearing loss, bilateral    [facility], [address], [place]    Office Visit    5 months ago Tinnitus, unspecified laterality    [facility], [address], Dave Eugene MD    Office Visit    5 months ago Encounter for annual health examination    [facility], Karol Padilla MD    Office Visit    8 months ago Anal abscess    [facility], [address], Natalya Mcdonough MD    Office Visit          Future Appointments         Provider Department Appt Notes    In 3 weeks MD Veronica Serna [facility], [address], [place] [facility] middle trigger finger    In 4 weeks MD [facility], [facility], Schuyler Boggs NP EE               Passed - [facility] or GFRNAA > 50     GFR Evaluation  EGFRCR: 75 , resulted on 3/13/2023                Recent Outpatient Visits              3 weeks ago Acute cough    [facility], [address], [place], [place] Office Visit    5 months ago Sensorineural hearing loss, bilateral    [facility], [address], [place] Madhu Alarcon    Office Visit    5 months ago Tinnitus, unspecified laterality    [address], [address], Miguel Sheets MD    Office Visit    5 months ago Encounter for annual health examination    [address], [address], Myah Smith MD    Office Visit    8 months ago Anal abscess    [facility], Steve Kerr MD    Office Visit             Future Appointments         Provider Department Appt Notes    In 3 weeks Ness Wilkins MD [address], [address], [place] [facility] middle trigger finger    In 4 weeks Cristina Gaming MD [address], [address], Samy TOUSSAINT

## 2023-08-31 ENCOUNTER — OFFICE VISIT (OUTPATIENT)
Dept: FAMILY MEDICINE CLINIC | Facility: CLINIC | Age: 73
End: 2023-08-31

## 2023-08-31 ENCOUNTER — HOSPITAL ENCOUNTER (OUTPATIENT)
Dept: GENERAL RADIOLOGY | Age: 73
Discharge: HOME | End: 2023-08-31
Attending: PHYSICIAN ASSISTANT
Payer: MEDICARE

## 2023-08-31 VITALS
SYSTOLIC BLOOD PRESSURE: 125 MMHG | WEIGHT: 170 LBS | BODY MASS INDEX: 25.18 KG/M2 | HEART RATE: 73 BPM | HEIGHT: 69 IN | DIASTOLIC BLOOD PRESSURE: 67 MMHG

## 2023-08-31 DIAGNOSIS — R05.1 ACUTE COUGH: Primary | ICD-10-CM

## 2023-08-31 DIAGNOSIS — R05.1 ACUTE COUGH: ICD-10-CM

## 2023-08-31 PROCEDURE — 1159F MED LIST DOCD IN RCRD: CPT | Performed by: PHYSICIAN ASSISTANT

## 2023-08-31 PROCEDURE — 3078F DIAST BP <80 MM HG: CPT | Performed by: PHYSICIAN ASSISTANT

## 2023-08-31 PROCEDURE — 99213 OFFICE O/P EST LOW 20 MIN: CPT | Performed by: PHYSICIAN ASSISTANT

## 2023-08-31 PROCEDURE — 3074F SYST BP LT 130 MM HG: CPT | Performed by: PHYSICIAN ASSISTANT

## 2023-08-31 PROCEDURE — 1126F AMNT PAIN NOTED NONE PRSNT: CPT | Performed by: PHYSICIAN ASSISTANT

## 2023-08-31 PROCEDURE — 71046 X-RAY EXAM CHEST 2 VIEWS: CPT | Performed by: PHYSICIAN ASSISTANT

## 2023-08-31 PROCEDURE — 3008F BODY MASS INDEX DOCD: CPT | Performed by: PHYSICIAN ASSISTANT

## 2023-08-31 RX ORDER — MONTELUKAST SODIUM 10 MG/1
10 TABLET ORAL DAILY
Qty: 90 TABLET | Refills: 0 | Status: SHIPPED | OUTPATIENT
Start: 2023-08-31 | End: 2023-11-29

## 2023-08-31 RX ORDER — BENZONATATE 200 MG/1
200 CAPSULE ORAL 3 TIMES DAILY PRN
Qty: 30 CAPSULE | Refills: 0 | Status: SHIPPED | OUTPATIENT
Start: 2023-08-31

## 2023-09-12 ENCOUNTER — OFFICE VISIT (OUTPATIENT)
Dept: ORTHOPEDICS CLINIC | Facility: CLINIC | Age: 73
End: 2023-09-12
Payer: MEDICARE

## 2023-09-12 ENCOUNTER — TELEPHONE (OUTPATIENT)
Dept: ORTHOPEDICS CLINIC | Facility: CLINIC | Age: 73
End: 2023-09-12

## 2023-09-12 VITALS — BODY MASS INDEX: 25.18 KG/M2 | WEIGHT: 170 LBS | HEIGHT: 69 IN

## 2023-09-12 DIAGNOSIS — M65.331 TRIGGER MIDDLE FINGER OF RIGHT HAND: Primary | ICD-10-CM

## 2023-09-12 PROCEDURE — 3008F BODY MASS INDEX DOCD: CPT | Performed by: ORTHOPAEDIC SURGERY

## 2023-09-12 PROCEDURE — 99214 OFFICE O/P EST MOD 30 MIN: CPT | Performed by: ORTHOPAEDIC SURGERY

## 2023-09-12 PROCEDURE — 1159F MED LIST DOCD IN RCRD: CPT | Performed by: ORTHOPAEDIC SURGERY

## 2023-09-12 PROCEDURE — 1160F RVW MEDS BY RX/DR IN RCRD: CPT | Performed by: ORTHOPAEDIC SURGERY

## 2023-09-12 PROCEDURE — 1126F AMNT PAIN NOTED NONE PRSNT: CPT | Performed by: ORTHOPAEDIC SURGERY

## 2023-09-12 NOTE — TELEPHONE ENCOUNTER
Date of Surgery: 23       Post Op Appt:  23 @ 0840    Case ID: [id number]    Notes:       [address] [place] SHEET   Name: Rosa Elena Lloyd  MRN: [id number]   : 1950     Surgical Date:    10/5/23   Surgical Consent:    A1 pulley release of right middle finger    Diagnosis:     (M65.331) Trigger middle finger of right hand  (primary encounter diagnosis)    Procedure Codes:    Trigger Finger Release (66170)   Disposition:    Outpatient   Operative Time:    15 mins   Antibiotics:    None   Anesthesia Type:    Local Only   Clearance:     NONE   Equipment:    TFR  OCTR (Local Only): Lead Hand, Drums Blade, 1% lidocaine w/ epi + 0.5% marcaine (off the field), 4-0 nylon, Bacitracin, Adaptic   Assistant:    Rosy Harris Assistant: Carolyn Manzo PA-C   Follow Up:    12-14 days post op with Theoplis Thien   Pain Medication:    OTC   Therapy:    None

## 2023-09-20 ENCOUNTER — OFFICE VISIT (OUTPATIENT)
Dept: OPHTHALMOLOGY | Facility: CLINIC | Age: 73
End: 2023-09-20

## 2023-09-20 DIAGNOSIS — H43.393 VITREOUS FLOATERS OF BOTH EYES: ICD-10-CM

## 2023-09-20 DIAGNOSIS — H25.13 AGE-RELATED NUCLEAR CATARACT OF BOTH EYES: Primary | ICD-10-CM

## 2023-09-20 PROCEDURE — 92004 COMPRE OPH EXAM NEW PT 1/>: CPT | Performed by: OPHTHALMOLOGY

## 2023-09-20 PROCEDURE — 1160F RVW MEDS BY RX/DR IN RCRD: CPT | Performed by: OPHTHALMOLOGY

## 2023-09-20 PROCEDURE — 92015 DETERMINE REFRACTIVE STATE: CPT | Performed by: OPHTHALMOLOGY

## 2023-09-20 PROCEDURE — 1159F MED LIST DOCD IN RCRD: CPT | Performed by: OPHTHALMOLOGY

## 2023-09-20 PROCEDURE — 1126F AMNT PAIN NOTED NONE PRSNT: CPT | Performed by: OPHTHALMOLOGY

## 2023-09-20 NOTE — PROGRESS NOTES
Malena Shetty is a 67year old male. HPI:     HPI    NP. Pt in today for a complete eye exam. Pt's last eye exam was about a year ago at [facility] and was prescribed glasses (forgot to bring them today). Pt denies any surgeries done to the eyes. Pt is pre-diabetic- diet controlled. Last HA1C was 5.6 on 3/13/23    Consult: per CLOVIS Alvarez Caller   Last edited by Joey Gallardo OT on 2023  2:23 PM.        Patient History:  Past Medical History:   Diagnosis Date    Basal cell carcinoma 07/15/2008    Right Lateral Leg    Blepharitis     Bilateral    Cataract      Bilateral    Essential hypertension     Herpes zoster without complication 6833    High blood pressure     High cholesterol     Hyperlipidemia     Meibomian gland dysfunction     Bilateral    Presbyopia     Bilateral    Pyogenic granuloma 1998    Per next gen \"RLL\"    Vitreous floaters        Surgical History: Malena Shetty has a past surgical history that includes ir epidural steriod injection () (epidural steroid injection to back x 1); skin surgery () (Leg, Skin cancer, Surgically removed- no othe treatment ); other surgical history (Arthrocentesis of the right hip trochanteric bursa); colonoscopy (N/A, 2017) (Procedure: COLONOSCOPY;  Surgeon: Irma Cortez MD;  Location: [facility] ENDOSCOPY); and colonoscopy (N/A, 2022) (Procedure: COLONOSCOPY;  Surgeon: Irma Cortez MD;  Location: [facility]). Family History   Problem Relation Age of Onset    Hypertension Mother     Thyroid disease Mother     Heart Disease Mother 80            Cancer Sister         uterine cancer.     Other (Other) Father         good health    Diabetes Neg     Glaucoma Neg     Macular degeneration Neg        Social History:   Social History     Socioeconomic History    Marital status:    Tobacco Use    Smoking status: Never    Smokeless tobacco: Never   Vaping Use    Vaping Use: Never used   Substance and Sexual Activity    Alcohol use: Yes     Alcohol/week: 0.0 standard drinks of alcohol     Comment: rarely    Drug use: No   Other Topics Concern    Caffeine Concern Yes     Comment: Daily; one cup, coffee    Reaction to local anesthetic No       Medications:  Current Outpatient Medications   Medication Sig Dispense Refill    benzonatate 200 MG Oral Cap Take 1 capsule (200 mg total) by mouth 3 (three) times daily as needed for cough. 30 capsule 0    montelukast (SINGULAIR) 10 MG Oral Tab Take 1 tablet (10 mg total) by mouth daily. 90 tablet 0    amLODIPine Besy-Benazepril HCl 10-40 MG Oral Cap Take 1 capsule by mouth daily. 90 capsule 3    simvastatin 20 MG Oral Tab Take 1 tablet (20 mg total) by mouth nightly. 90 tablet 3    ibuprofen 800 MG Oral Tab Take 1 tablet (800 mg total) by mouth every 8 (eight) hours as needed for Pain. 90 tablet 1    lidocaine 4 % External Patch Place 1 patch onto the skin daily. 15 patch 0    lidocaine 5 % External Patch Place 1 patch onto the skin daily. 15 patch 0    levocetirizine 5 MG Oral Tab Take 1 tablet (5 mg total) by mouth every evening. 30 tablet 0    carbamide peroxide 6.5 % Otic Solution Place 5 drops into the left ear 2 (two) times daily. 15 mL 0    diazePAM (VALIUM) 2 MG Oral Tab Take 1 tablet (2 mg total) by mouth nightly as needed for Anxiety or Sleep. 10 tablet 0    Lidocaine HCl 0.5 % External Gel Apply 1 Application topically daily. 237 g 0    hydrocortisone 2.5 % External Cream Apply 1 Application topically 2 (two) times daily. 28 g 1    clotrimazole-betamethasone 1-0.05 % External Cream Apply 1 Application topically 2 (two) times daily. 45 g 0    cyanocobalamine 100 MCG Oral Tab Take 2.5 tablets (250 mcg total) by mouth daily. Multiple Vitamins-Minerals (CENTRUM VITAMINTS OR) Take 1 tablet by mouth daily.          Allergies:  No Known Allergies    ROS:     ROS    Positive for: Eyes  Negative for: Constitutional, Gastrointestinal, Neurological, Skin, Genitourinary, Musculoskeletal, HENT, Endocrine, Cardiovascular, Respiratory, Psychiatric, Allergic/Imm, Heme/Lymph  Last edited by Dimas Manzo OT on 9/20/2023  2:03 PM.          PHYSICAL EXAM:     Base Eye Exam       Visual Acuity (Snellen - Linear)         Right Left    Dist sc 20/40 +1 20/25 +2    Dist ph sc 20/20     Near sc 20/50- 20/40-              Tonometry (Icare, 2:47 PM)         Right Left    Pressure 17 17              Pupils         Pupils    Right PERRL    Left PERRL              Visual Fields         Left Right     Full Full              Extraocular Movement         Right Left     Full, Ortho Full, Ortho              Dilation       Both eyes: 1.0% Mydriacyl and 2.5% Reinaldo Synephrine @ 2:47 PM              Dilation #2       Both eyes: 1.0% Mydriacyl and 2.5% Reinaldo Synephrine @ 2:50 PM                  Slit Lamp and Fundus Exam       Slit Lamp Exam         Right Left    Lids/Lashes Dermatochalasis, Meibomian gland dysfunction Dermatochalasis, Meibomian gland dysfunction    Conjunctiva/Sclera Nasal/temp pinguecula Nasal pinguecula    Cornea Clear 2 old scars at 5 o'clock    Anterior Chamber Deep and quiet Deep and quiet    Iris Normal Normal    Lens 1+ Nuclear sclerosis 1+ Nuclear sclerosis    Vitreous Vitreous floaters Vitreous floaters              Fundus Exam         Right Left    Disc Good rim, Temporal crescent Good rim, Temporal crescent    C/D Ratio 0.35 0.35    Macula Normal Normal    Vessels Normal Normal    Periphery Normal Normal                  Refraction       Wearing Rx       Type: Forgot glasses              Manifest Refraction (Auto)         Sphere Cylinder [place] Dist [place] Add Near [place]    Right +1.25 +0.50 145       Left +0.75 +0.25 155                 Manifest Refraction #2         Sphere Cylinder [place] Dist VA Add Near [place]    Right +1.25 Sphere  20/20 +2.50 20/20    Left +1.00 Sphere  20/20 +2.50 20/20              Final Rx         Sphere Cylinder Dist VA Near VA    Right +1.25 Sphere 20/20     Left +1.00 Sphere 20/20       Type: Distance only              Final Rx #2         Sphere Cylinder Dist VA Near VA    Right +3.75 Sphere  20/20    Left +3.50 Sphere  20/20      Type: Reading only                     ASSESSMENT/PLAN:     Diagnoses and Plan:     Age-related nuclear cataract of both eyes  Discussed early cataracts with patient. No treatment recommended at this time. New glasses Rx given today for distance only and reading only    Will see patient in 1 year for a complete exam.      Vitreous floaters of both eyes   There is no evidence of retinal pathology. All signs and symptoms of retinal detachment/tears explained in detail. Patient instructed to call the office if they experience increase in floaters, increase in flashes of light, loss of vision or curtain or veil effect. No orders of the defined types were placed in this encounter.       Meds This Visit:  Requested Prescriptions      No prescriptions requested or ordered in this encounter        Follow up instructions:  Return in about 1 year (around 9/20/2024) for complete exam.    9/20/2023  Scribed by: Zonia Mendez MD

## 2023-09-20 NOTE — ASSESSMENT & PLAN NOTE
Discussed early cataracts with patient. No treatment recommended at this time.      New glasses Rx given today for distance only and reading only    Will see patient in 1 year for a complete exam.

## 2023-09-20 NOTE — PATIENT INSTRUCTIONS
Age-related nuclear cataract of both eyes  Discussed early cataracts with patient. No treatment recommended at this time. New glasses Rx given today for distance only and reading only    Will see patient in 1 year for a complete exam.      Vitreous floaters of both eyes   There is no evidence of retinal pathology. All signs and symptoms of retinal detachment/tears explained in detail. Patient instructed to call the office if they experience increase in floaters, increase in flashes of light, loss of vision or curtain or veil effect.

## 2023-09-22 NOTE — TELEPHONE ENCOUNTER
CALLED USING AN  TO SPEAK WITH MENDY TO DISCUSS THE SURGICAL PROTOCOL AND POST OP APPT. WAS UNABLE TO REACH MENDY BUT LEFT A MESSAGE ASKING HIM TO GIVE THE OFFICE A CALL BACK.

## 2023-11-13 ENCOUNTER — OFFICE VISIT (OUTPATIENT)
Dept: ORTHOPEDICS CLINIC | Facility: CLINIC | Age: 73
End: 2023-11-13
Payer: MEDICARE

## 2023-11-13 VITALS — WEIGHT: 170 LBS | BODY MASS INDEX: 25.18 KG/M2 | HEIGHT: 69 IN

## 2023-11-13 DIAGNOSIS — M65.331 TRIGGER MIDDLE FINGER OF RIGHT HAND: Primary | ICD-10-CM

## 2023-11-13 PROCEDURE — 1160F RVW MEDS BY RX/DR IN RCRD: CPT | Performed by: PHYSICIAN ASSISTANT

## 2023-11-13 PROCEDURE — 3008F BODY MASS INDEX DOCD: CPT | Performed by: PHYSICIAN ASSISTANT

## 2023-11-13 PROCEDURE — 1126F AMNT PAIN NOTED NONE PRSNT: CPT | Performed by: PHYSICIAN ASSISTANT

## 2023-11-13 PROCEDURE — 1159F MED LIST DOCD IN RCRD: CPT | Performed by: PHYSICIAN ASSISTANT

## 2023-11-13 PROCEDURE — 99024 POSTOP FOLLOW-UP VISIT: CPT | Performed by: PHYSICIAN ASSISTANT

## 2023-12-06 NOTE — TELEPHONE ENCOUNTER
Please review. Protocol failed / No protocol.     Requested Prescriptions   Pending Prescriptions Disp Refills    CLOTRIMAZOLE-BETAMETHASONE 1-0.05 % External Cream [Pharmacy Med Name: Merry Godoy 15GM] 60 g 0     Sig: APPLY TOPICALLY TO THE AFFECTED AREA TWICE DAILY AS NEEDED       There is no refill protocol information for this order          Future Appointments         Provider Department Appt Notes    In 2 weeks SER[facility], [address], [place] [address]LE FINGER RELEASE; DOS 10/31/23            Recent Outpatient Visits              3 weeks ago Trigger middle finger of right hand    [facility] [employer]    Office Visit    2 months ago Age-related nuclear cataract of both eyes    Mary Douglas, [address],[address], [place], Suzanne Daniel MD    Office Visit    2 months ago Trigger middle finger of right hand    [facility]    Office Visit    3 months ago Acute cough    [facility], [address], [place], [place]    Office Visit    4 months ago Acute cough    [facility], [address], [place], [place]    Office Visit

## 2023-12-07 RX ORDER — CLOTRIMAZOLE AND BETAMETHASONE DIPROPIONATE 10; .64 MG/G; MG/G
1 CREAM TOPICAL 2 TIMES DAILY PRN
Qty: 60 G | Refills: 0 | Status: SHIPPED | OUTPATIENT
Start: 2023-12-07

## 2023-12-20 ENCOUNTER — OFFICE VISIT (OUTPATIENT)
Dept: ORTHOPEDICS CLINIC | Facility: CLINIC | Age: 73
End: 2023-12-20
Payer: MEDICARE

## 2023-12-20 VITALS — HEIGHT: 69 IN | WEIGHT: 170 LBS | BODY MASS INDEX: 25.18 KG/M2

## 2023-12-20 DIAGNOSIS — M65.331 TRIGGER MIDDLE FINGER OF RIGHT HAND: Primary | ICD-10-CM

## 2023-12-20 PROCEDURE — 1159F MED LIST DOCD IN RCRD: CPT | Performed by: PHYSICIAN ASSISTANT

## 2023-12-20 PROCEDURE — 1160F RVW MEDS BY RX/DR IN RCRD: CPT | Performed by: PHYSICIAN ASSISTANT

## 2023-12-20 PROCEDURE — 3008F BODY MASS INDEX DOCD: CPT | Performed by: PHYSICIAN ASSISTANT

## 2023-12-20 PROCEDURE — 1126F AMNT PAIN NOTED NONE PRSNT: CPT | Performed by: PHYSICIAN ASSISTANT

## 2023-12-20 PROCEDURE — 99024 POSTOP FOLLOW-UP VISIT: CPT | Performed by: PHYSICIAN ASSISTANT

## 2023-12-28 ENCOUNTER — TELEPHONE (OUTPATIENT)
Dept: FAMILY MEDICINE CLINIC | Facility: CLINIC | Age: 73
End: 2023-12-28

## 2023-12-28 DIAGNOSIS — M65.331 TRIGGER MIDDLE FINGER OF RIGHT HAND: Primary | ICD-10-CM

## 2023-12-28 NOTE — TELEPHONE ENCOUNTER
Huyen Segovia from [facility] AT [facility] needs a referral from Dr. Mahendra Calhoun to be sent to the patient's insurance company in order to be seen by Dr. Narendra Talavera

## 2024-02-26 ENCOUNTER — OFFICE VISIT (OUTPATIENT)
Dept: FAMILY MEDICINE CLINIC | Facility: CLINIC | Age: 74
End: 2024-02-26

## 2024-02-26 ENCOUNTER — LAB ENCOUNTER (OUTPATIENT)
Dept: LAB | Age: 74
End: 2024-02-26
Attending: FAMILY MEDICINE
Payer: MEDICARE

## 2024-02-26 VITALS
DIASTOLIC BLOOD PRESSURE: 77 MMHG | BODY MASS INDEX: 25 KG/M2 | SYSTOLIC BLOOD PRESSURE: 124 MMHG | WEIGHT: 172 LBS | HEART RATE: 78 BPM

## 2024-02-26 DIAGNOSIS — H61.22 IMPACTED CERUMEN OF LEFT EAR: ICD-10-CM

## 2024-02-26 DIAGNOSIS — E79.0 HYPERURICEMIA: ICD-10-CM

## 2024-02-26 DIAGNOSIS — Z00.00 ENCOUNTER FOR ANNUAL HEALTH EXAMINATION: Primary | ICD-10-CM

## 2024-02-26 DIAGNOSIS — H43.393 VITREOUS FLOATERS OF BOTH EYES: ICD-10-CM

## 2024-02-26 DIAGNOSIS — N52.9 ERECTILE DYSFUNCTION, UNSPECIFIED ERECTILE DYSFUNCTION TYPE: ICD-10-CM

## 2024-02-26 DIAGNOSIS — I10 ESSENTIAL HYPERTENSION: Chronic | ICD-10-CM

## 2024-02-26 DIAGNOSIS — H25.13 AGE-RELATED NUCLEAR CATARACT OF BOTH EYES: ICD-10-CM

## 2024-02-26 DIAGNOSIS — G47.30 SLEEP APNEA, UNSPECIFIED TYPE: ICD-10-CM

## 2024-02-26 DIAGNOSIS — M48.061 SPINAL STENOSIS OF LUMBAR REGION, UNSPECIFIED WHETHER NEUROGENIC CLAUDICATION PRESENT: ICD-10-CM

## 2024-02-26 DIAGNOSIS — Z85.828 HISTORY OF BASAL CELL CARCINOMA (BCC): ICD-10-CM

## 2024-02-26 DIAGNOSIS — Z97.3 WEARS GLASSES: ICD-10-CM

## 2024-02-26 DIAGNOSIS — Z87.39 HISTORY OF GOUT: ICD-10-CM

## 2024-02-26 DIAGNOSIS — E78.2 MIXED HYPERLIPIDEMIA: ICD-10-CM

## 2024-02-26 DIAGNOSIS — H93.13 TINNITUS OF BOTH EARS: ICD-10-CM

## 2024-02-26 DIAGNOSIS — R73.03 PREDIABETES: ICD-10-CM

## 2024-02-26 DIAGNOSIS — H90.3 SENSORINEURAL HEARING LOSS, BILATERAL: ICD-10-CM

## 2024-02-26 DIAGNOSIS — C44.701: ICD-10-CM

## 2024-02-26 DIAGNOSIS — M46.96 UNSPECIFIED INFLAMMATORY SPONDYLOPATHY, LUMBAR REGION (HCC): ICD-10-CM

## 2024-02-26 PROBLEM — M65.331 TRIGGER MIDDLE FINGER OF RIGHT HAND: Status: RESOLVED | Noted: 2020-07-13 | Resolved: 2024-02-26

## 2024-02-26 PROBLEM — M47.816 FACET ARTHRITIS OF LUMBAR REGION: Status: RESOLVED | Noted: 2018-05-30 | Resolved: 2024-02-26

## 2024-02-26 LAB
ALBUMIN SERPL-MCNC: 4.4 G/DL (ref 3.2–4.8)
ALBUMIN/GLOB SERPL: 1.4 {RATIO} (ref 1–2)
ALP LIVER SERPL-CCNC: 46 U/L
ALT SERPL-CCNC: 28 U/L
ANION GAP SERPL CALC-SCNC: 6 MMOL/L (ref 0–18)
AST SERPL-CCNC: 30 U/L (ref ?–34)
BASOPHILS # BLD AUTO: 0.06 X10(3) UL (ref 0–0.2)
BASOPHILS NFR BLD AUTO: 0.8 %
BILIRUB SERPL-MCNC: 0.7 MG/DL (ref 0.2–1.1)
BUN BLD-MCNC: 17 MG/DL (ref 9–23)
BUN/CREAT SERPL: 18.3 (ref 10–20)
CALCIUM BLD-MCNC: 9.3 MG/DL (ref 8.7–10.4)
CHLORIDE SERPL-SCNC: 108 MMOL/L (ref 98–112)
CHOLEST SERPL-MCNC: 165 MG/DL (ref ?–200)
CO2 SERPL-SCNC: 28 MMOL/L (ref 21–32)
CREAT BLD-MCNC: 0.93 MG/DL
DEPRECATED RDW RBC AUTO: 44.3 FL (ref 35.1–46.3)
EGFRCR SERPLBLD CKD-EPI 2021: 87 ML/MIN/1.73M2 (ref 60–?)
EOSINOPHIL # BLD AUTO: 0.21 X10(3) UL (ref 0–0.7)
EOSINOPHIL NFR BLD AUTO: 2.7 %
ERYTHROCYTE [DISTWIDTH] IN BLOOD BY AUTOMATED COUNT: 13.2 % (ref 11–15)
EST. AVERAGE GLUCOSE BLD GHB EST-MCNC: 114 MG/DL (ref 68–126)
FASTING PATIENT LIPID ANSWER: NO
FASTING STATUS PATIENT QL REPORTED: NO
GLOBULIN PLAS-MCNC: 3.1 G/DL (ref 2.8–4.4)
GLUCOSE BLD-MCNC: 94 MG/DL (ref 70–99)
HBA1C MFR BLD: 5.6 % (ref ?–5.7)
HCT VFR BLD AUTO: 42.3 %
HDLC SERPL-MCNC: 50 MG/DL (ref 40–59)
HGB BLD-MCNC: 13.8 G/DL
IMM GRANULOCYTES # BLD AUTO: 0.04 X10(3) UL (ref 0–1)
IMM GRANULOCYTES NFR BLD: 0.5 %
LDLC SERPL CALC-MCNC: 88 MG/DL (ref ?–100)
LYMPHOCYTES # BLD AUTO: 2.69 X10(3) UL (ref 1–4)
LYMPHOCYTES NFR BLD AUTO: 34.9 %
MCH RBC QN AUTO: 30 PG (ref 26–34)
MCHC RBC AUTO-ENTMCNC: 32.6 G/DL (ref 31–37)
MCV RBC AUTO: 92 FL
MONOCYTES # BLD AUTO: 0.63 X10(3) UL (ref 0.1–1)
MONOCYTES NFR BLD AUTO: 8.2 %
NEUTROPHILS # BLD AUTO: 4.07 X10 (3) UL (ref 1.5–7.7)
NEUTROPHILS # BLD AUTO: 4.07 X10(3) UL (ref 1.5–7.7)
NEUTROPHILS NFR BLD AUTO: 52.9 %
NONHDLC SERPL-MCNC: 115 MG/DL (ref ?–130)
OSMOLALITY SERPL CALC.SUM OF ELEC: 295 MOSM/KG (ref 275–295)
PLATELET # BLD AUTO: 270 10(3)UL (ref 150–450)
POTASSIUM SERPL-SCNC: 4.4 MMOL/L (ref 3.5–5.1)
PROT SERPL-MCNC: 7.5 G/DL (ref 5.7–8.2)
RBC # BLD AUTO: 4.6 X10(6)UL
SODIUM SERPL-SCNC: 142 MMOL/L (ref 136–145)
TRIGL SERPL-MCNC: 156 MG/DL (ref 30–149)
TSI SER-ACNC: 1.23 MIU/ML (ref 0.55–4.78)
VLDLC SERPL CALC-MCNC: 25 MG/DL (ref 0–30)
WBC # BLD AUTO: 7.7 X10(3) UL (ref 4–11)

## 2024-02-26 PROCEDURE — 36415 COLL VENOUS BLD VENIPUNCTURE: CPT | Performed by: FAMILY MEDICINE

## 2024-02-26 PROCEDURE — 83036 HEMOGLOBIN GLYCOSYLATED A1C: CPT | Performed by: FAMILY MEDICINE

## 2024-02-26 PROCEDURE — 84443 ASSAY THYROID STIM HORMONE: CPT | Performed by: FAMILY MEDICINE

## 2024-02-26 PROCEDURE — 85025 COMPLETE CBC W/AUTO DIFF WBC: CPT | Performed by: FAMILY MEDICINE

## 2024-02-26 PROCEDURE — 80053 COMPREHEN METABOLIC PANEL: CPT | Performed by: FAMILY MEDICINE

## 2024-02-26 PROCEDURE — 80061 LIPID PANEL: CPT | Performed by: FAMILY MEDICINE

## 2024-02-26 RX ORDER — SILDENAFIL 100 MG/1
100 TABLET, FILM COATED ORAL AS NEEDED
Qty: 10 TABLET | Refills: 1 | Status: SHIPPED | OUTPATIENT
Start: 2024-02-26

## 2024-02-26 NOTE — PROGRESS NOTES
Subjective:   Dean Daily is a 73 year old male who presents for a Medicare Subsequent Annual Wellness visit (Pt already had Initial Annual Wellness) and scheduled follow up of multiple significant but stable problems.       History/Other:   Fall Risk Assessment:   He has been screened for Falls and is low risk.      Cognitive Assessment:   He had a completely normal cognitive assessment - see flowsheet entries     Functional Ability/Status:   Dean Daily has some abnormal functions as listed below:  He has Hearing problems based on screening of functional status.      Depression Screening (PHQ-2/PHQ-9): PHQ-2 SCORE: 0  , done 2/26/2024             Advanced Directives:   He does NOT have a Living Will. [Do you have a living will?: No]  He does NOT have a Power of  for Health Care. [Do you have a healthcare power of ?: No]  Not discussed      Patient Active Problem List   Diagnosis    Essential hypertension    Cancer of skin of leg    Sleep apnea    Spinal stenosis of lumbar region    Prediabetes    Hyperuricemia    Unspecified inflammatory spondylopathy, lumbar region (HCC)    Sensorineural hearing loss, bilateral    History of gout    History of basal cell carcinoma (BCC)    Wears glasses    Tinnitus of both ears    Impacted cerumen of left ear    Vision blurring    Age-related nuclear cataract of both eyes    Vitreous floaters of both eyes     Allergies:  He has No Known Allergies.    Current Medications:  Outpatient Medications Marked as Taking for the 2/26/24 encounter (Office Visit) with Cristal Mckeon MD   Medication Sig    Sildenafil Citrate (VIAGRA) 100 MG Oral Tab Take 1 tablet (100 mg total) by mouth as needed for Erectile Dysfunction.    clotrimazole-betamethasone 1-0.05 % External Cream Apply 1 Application topically 2 (two) times daily as needed.    melatonin 5 MG Oral Cap Take 1 capsule (5 mg total) by mouth as needed.    amLODIPine Besy-Benazepril HCl 10-40 MG Oral Cap Take 1  capsule by mouth daily.    simvastatin 20 MG Oral Tab Take 1 tablet (20 mg total) by mouth nightly.    ibuprofen 800 MG Oral Tab Take 1 tablet (800 mg total) by mouth every 8 (eight) hours as needed for Pain.    levocetirizine 5 MG Oral Tab Take 1 tablet (5 mg total) by mouth every evening.    hydrocortisone 2.5 % External Cream Apply 1 Application topically 2 (two) times daily.    cyanocobalamine 100 MCG Oral Tab Take 2.5 tablets (250 mcg total) by mouth daily.    Multiple Vitamins-Minerals (CENTRUM VITAMINTS OR) Take 1 tablet by mouth daily.       Medical History:  He  has a past medical history of Basal cell carcinoma (07/15/2008), Blepharitis (1998), Cataract (2010), Essential hypertension, Herpes zoster without complication (8/15/2020), High blood pressure, High cholesterol, Hyperlipidemia, Meibomian gland dysfunction (1998), Presbyopia (1998), Pyogenic granuloma (1998), and Vitreous floaters (2010).  Surgical History:  He  has a past surgical history that includes ir epidural steriod injection (2009); skin surgery (2007); other surgical history; colonoscopy (N/A, 5/19/2017); and colonoscopy (N/A, 8/22/2022).   Family History:  His family history includes Cancer in his sister; Heart Disease (age of onset: 95) in his mother; Hypertension in his mother; Other in his father; Thyroid disease in his mother.  Social History:  He  reports that he has never smoked. He has never been exposed to tobacco smoke. He has never used smokeless tobacco. He reports current alcohol use. He reports that he does not use drugs.    Tobacco:  He has never smoked tobacco.    CAGE Alcohol Screen:   CAGE screening score of 0 on 2/26/2024, showing low risk of alcohol abuse.      Patient Care Team:  Cristal Mckeon MD as PCP - General (Family Medicine)  Kush Rodriguez MD (Anesthesiology)  Armando Ramírez MD (SURGERY, ORTHOPEDIC)    Review of Systems     Negative except per hpi     Objective:   Physical Exam  General appearance:  alert  HEENT: Normocephalic, without obvious abnormality, atraumatic. PERRL, EOMI, pink conjunctiva.   Neck: supple, symmetrical, trachea midline, no adenopathy, no JVD and thyroid not enlarged,  Lungs: respirations unlabored, clear to auscultation bilaterally  Heart: regular rate and rhythm, S1, S2 normal, no murmur  Abdomen: normoactive bowel sounds x4; soft, non-distended; nontender; no masses  Extremities: extremities normal, atraumatic, no cyanosis or edema; no erythema or tenderness in calves bilaterally  Neurologic: alert, oriented x3    /77   Pulse 78   Wt 172 lb (78 kg)   BMI 25.40 kg/m²  Estimated body mass index is 25.4 kg/m² as calculated from the following:    Height as of 12/20/23: 5' 9\" (1.753 m).    Weight as of this encounter: 172 lb (78 kg).    Medicare Hearing Assessment:   Hearing Screening    Time taken: 2/26/2024  8:41 AM  Screening Method: Questionnaire  I have a problem hearing over the telephone: No I have trouble following the conversations when two or more people are talking at the same time: No   I have trouble understanding things on the TV: No I have to strain to understand conversations: No   I have to worry about missing the telephone ring or doorbell: No I have trouble hearing conversations in a noisy background such as a crowded room or restaurant: No   I get confused about where sounds come from: No I misunderstand some words in a sentence and need to ask people to repeat themselves: No   I especially have trouble understanding the speech of women and children: No I have trouble understanding the speaker in a large room such as at a meeting or place of Buddhism: No   Many people I talk to seem to mumble (or don't speak clearly): No People get annoyed because I misunderstand what they say: No   I misunderstand what others are saying and make inappropriate responses: No I avoid social activities because I cannot hear well and fear I will reply improperly: No   Family members  and friends have told me they think I may have hearing loss: No               Vision testing not required for subsequent Medicare annual exam      Assessment & Plan:   Dean Daily is a 73 year old male who presents for a Medicare Assessment.     1. Encounter for annual health examination  -Medical, surgical and social history, as well as medications and allergies were reviewed with patient.  - CBC With Differential With Platelet  - Comp Metabolic Panel (14)  - Hemoglobin A1C  - Lipid Panel  - TSH W Reflex To Free T4    2. Erectile dysfunction, unspecified erectile dysfunction type  - Sildenafil Citrate (VIAGRA) 100 MG Oral Tab; Take 1 tablet (100 mg total) by mouth as needed for Erectile Dysfunction.  Dispense: 10 tablet; Refill: 1    3. Unspecified inflammatory spondylopathy, lumbar region (HCC)  - Stable condition, continue present management.      4. Essential hypertension  - Stable condition, continue present management.     5. Mixed hyperlipidemia  - Stable condition, continue present management.     6. Prediabetes  - Stable condition, continue present management.     7. Spinal stenosis of lumbar region, unspecified whether neurogenic claudication present  - Stable condition, continue present management.     8. Hyperuricemia  - Stable condition, continue present management.     9. History of gout  - Stable condition, continue present management.     10. History of basal cell carcinoma (BCC)  - Stable condition, continue present management.     11. Malignant neoplasm of skin of lower extremity, unspecified laterality  - Stable condition, continue present management.     12. Tinnitus of both ears  - Stable condition, continue present management.     13. Sensorineural hearing loss, bilateral  - Stable condition, continue present management.     14. Impacted cerumen of left ear  - Stable condition, continue present management.     15. Wears glasses  - Stable condition, continue present management.     16.  Vitreous floaters of both eyes  - Stable condition, continue present management.     17. Sleep apnea, unspecified type  - Stable condition, continue present management.     18. Age-related nuclear cataract of both eyes  - Stable condition, continue present management.     The patient indicates understanding of these issues and agrees to the plan.  Reinforced healthy diet, lifestyle, and exercise.      No follow-ups on file.     Cristal Mckeon MD, 2/26/2024     Supplementary Documentation:   General Health:  In the past six months, have you lost more than 10 pounds without trying?: 2 - No  Has your appetite been poor?: No  Type of Diet: Balanced  How does the patient maintain a good energy level?: Appropriate Exercise  How would you describe your daily physical activity?: Moderate  How would you describe your current health state?: Good  How do you maintain positive mental well-being?: Games  On a scale of 0 to 10, with 0 being no pain and 10 being severe pain, what is your pain level?: 0 - (None)  In the past six months, have you experienced urine leakage?: 0-No  At any time do you feel concerned for the safety/well-being of yourself and/or your children, in your home or elsewhere?: No  Have you had any immunizations at another office such as Influenza, Hepatitis B, Tetanus, or Pneumococcal?: No        Dean Daily's SCREENING SCHEDULE   Tests on this list are recommended by your physician but may not be covered, or covered at this frequency, by your insurer.   Please check with your insurance carrier before scheduling to verify coverage.   PREVENTATIVE SERVICES FREQUENCY &  COVERAGE DETAILS LAST COMPLETION DATE   Diabetes Screening    Fasting Blood Sugar / Glucose    One screening every 12 months if never tested or if previously tested but not diagnosed with pre-diabetes   One screening every 6 months if diagnosed with pre-diabetes Lab Results   Component Value Date    GLU 96 03/13/2023        Cardiovascular  Disease Screening    Lipid Panel  Cholesterol  Lipoprotein (HDL)  Triglycerides Covered every 5 years for all Medicare beneficiaries without apparent signs or symptoms of cardiovascular disease Lab Results   Component Value Date    CHOLEST 153 03/13/2023    HDL 57 03/13/2023    LDL 70 03/13/2023    TRIG 151 (H) 03/13/2023         Electrocardiogram (EKG)   Covered if needed at Welcome to Medicare, and non-screening if indicated for medical reasons -      Ultrasound Screening for Abdominal Aortic Aneurysm (AAA) Covered once in a lifetime for one of the following risk factors    Men who are 65-75 years old and have ever smoked    Anyone with a family history -     Colorectal Cancer Screening  Covered for ages 50-85; only need ONE of the following:    Colonoscopy   Covered every 10 years    Covered every 2 years if patient is at high risk or previous colonoscopy was abnormal 08/22/2022    Health Maintenance   Topic Date Due    Colorectal Cancer Screening  08/22/2029       Flexible Sigmoidoscopy   Covered every 4 years -    Fecal Occult Blood Test Covered annually -   Prostate Cancer Screening    Prostate-Specific Antigen (PSA) Annually Lab Results   Component Value Date    PSA 2.6 06/09/2018     Health Maintenance   Topic Date Due    PSA  03/13/2025      Immunizations    Influenza Covered once per flu season  Please get every year 09/20/2023  No recommendations at this time    Pneumococcal Each vaccine ([id number] & Broyzwkbj32) covered once after 65 Prevnar 13: 07/21/2017    Ujfgirmst17: 02/22/2022     No recommendations at this time    Hepatitis B One screening covered for patients with certain risk factors   -  No recommendations at this time    Tetanus Toxoid Not covered by Medicare Part B unless medically necessary (cut with metal); may be covered with your pharmacy prescription benefits -    Tetanus, Diptheria and Pertusis TD and TDaP Not covered by Medicare Part B -  No recommendations at this time    Zoster Not  covered by Medicare Part B; may be covered with your pharmacy  prescription benefits -  No recommendations at this time     Annual Monitoring of Persistent Medications (ACE/ARB, digoxin diuretics, anticonvulsants)    Potassium Annually Lab Results   Component Value Date    K 4.3 03/13/2023         Creatinine   Annually Lab Results   Component Value Date    CREATSERUM 1.06 03/13/2023         BUN Annually Lab Results   Component Value Date    BUN 16 03/13/2023       Drug Serum Conc Annually No results found for: \"DIGOXIN\", \"DIG\", \"VALP\"

## 2024-03-11 ENCOUNTER — OFFICE VISIT (OUTPATIENT)
Dept: FAMILY MEDICINE CLINIC | Facility: CLINIC | Age: 74
End: 2024-03-11

## 2024-03-11 VITALS
SYSTOLIC BLOOD PRESSURE: 131 MMHG | HEART RATE: 74 BPM | BODY MASS INDEX: 25 KG/M2 | WEIGHT: 169 LBS | DIASTOLIC BLOOD PRESSURE: 76 MMHG

## 2024-03-11 DIAGNOSIS — I83.90 VARICOSE VEINS: Primary | ICD-10-CM

## 2024-03-11 PROCEDURE — 3078F DIAST BP <80 MM HG: CPT | Performed by: FAMILY MEDICINE

## 2024-03-11 PROCEDURE — 1126F AMNT PAIN NOTED NONE PRSNT: CPT | Performed by: FAMILY MEDICINE

## 2024-03-11 PROCEDURE — 3075F SYST BP GE 130 - 139MM HG: CPT | Performed by: FAMILY MEDICINE

## 2024-03-11 PROCEDURE — 1160F RVW MEDS BY RX/DR IN RCRD: CPT | Performed by: FAMILY MEDICINE

## 2024-03-11 PROCEDURE — 1159F MED LIST DOCD IN RCRD: CPT | Performed by: FAMILY MEDICINE

## 2024-03-11 PROCEDURE — 99213 OFFICE O/P EST LOW 20 MIN: CPT | Performed by: FAMILY MEDICINE

## 2024-03-11 NOTE — PROGRESS NOTES
Chief Complaint   Patient presents with    Varicose Veins     C/o right leg feeling warm, itchy and painful     HPI:   Dean Daily is a 73 year old male who presents to clinic with complaints of R inner leg/ medial knee warmth, itching and discomfort for years with acute worsening.   Has varicose veins affecting the area. Compression stockings not helping.   Ibuprofen helps but does not want to take too frequently.    REVIEW OF SYSTEMS:   Negative, except per HPI.     EXAM:   /72 (BP Location: Left arm, Patient Position: Sitting, Cuff Size: adult)   Pulse 74   Wt 169 lb (76.7 kg)   BMI 24.96 kg/m²   Body mass index is 24.96 kg/m².  GENERAL: well developed, well nourished, in no apparent distress  SKIN: no rashes, no suspicious lesions  HEENT: atraumatic, normocephalic  EYES: PERRLA, EOMI,conjunctiva are clear  NECK: supple, no adenopathy  RLE: + varicose and spider veins medial knee/ inner leg    ASSESSMENT AND PLAN:     Varicose veins  See vein specialist, compression stockings not helping.   - SPECIALTY (OTHER) - EXTERNAL  - diclofenac 1 % External Gel; Apply 2 g topically 4 (four) times daily.  Dispense: 100 g; Refill: 0     RTC if no improvement in symptoms. Red flags discussed to go to ER.     Cristal Mckeon MD  3/11/2024  9:12 AM

## 2024-04-02 ENCOUNTER — TELEPHONE (OUTPATIENT)
Dept: FAMILY MEDICINE CLINIC | Facility: CLINIC | Age: 74
End: 2024-04-02

## 2024-04-02 DIAGNOSIS — M65.331 TRIGGER MIDDLE FINGER OF RIGHT HAND: Primary | ICD-10-CM

## 2024-04-02 NOTE — TELEPHONE ENCOUNTER
Patient is requesting referral.     Name of specialist and specialty department : Dr. Lamonte Marsh, Cardio Surgeon   Reason for visit with the specialist: CPT code 10459 and 16736  Address of the specialist office: [facility]   Appointment date: 4/4         CSS informed patient the turnaround time for referral is 5-7 business days.  Patient was informed to check their Meetup account for referral status.     Treva with Dr. Marsh's office called to request the referral.     Treva is asking if referral can be faxed to the below:  Fax # [phone]

## 2024-04-02 NOTE — TELEPHONE ENCOUNTER
I rec'd this message from Tracy Nogueira, SWETHA :    \"Patient underwent surgery by Dr Armando Ramírez at [facility] on 10/31/23.  [id number]  DX M65.331   Auto referral for MD completed - can you please submit referral for facility - [facility]  NPI [id number]  [id number]. Thank you \"    I just copied and pasted but that is not my NPI   Please double check that its what they need.     Thank you!

## 2024-04-09 ENCOUNTER — MED REC SCAN ONLY (OUTPATIENT)
Dept: FAMILY MEDICINE CLINIC | Facility: CLINIC | Age: 74
End: 2024-04-09

## 2024-06-11 ENCOUNTER — TELEPHONE (OUTPATIENT)
Dept: FAMILY MEDICINE CLINIC | Facility: CLINIC | Age: 74
End: 2024-06-11

## 2024-06-11 DIAGNOSIS — H93.13 TINNITUS OF BOTH EARS: Primary | ICD-10-CM

## 2024-06-11 DIAGNOSIS — H90.3 SENSORINEURAL HEARING LOSS, BILATERAL: ICD-10-CM

## 2024-06-11 NOTE — TELEPHONE ENCOUNTER
With [employer]  Isaak#[id number] .  Patient denied requesting urine order request . See Lelia's note below.     Patient is requesting an ENT referral external.   He was seen by  Dr Ohara and the audiologist back in 2023 and was advised to get  a hearing aid which will costs him $500.  He contacted his insurance and was advised to get a referral for [facility], this will be free of charge.   He prefers to  the paper copy of the referral before he call and schedule with them .     [facility]  Audiology Dept .  [address] [place] [address]       OFFICE STAFF=please contact the patient once referral is ready for , thanks.       COPIED and PASTE 3/16/23  audiologist's note ;  Patient complaint of tinnitus.   Previous audiograms were completed at this office in 2016 and 2020.     Borderline normal hearing through 500Hz, sloping to a moderately severe HFSNHL bilaterally.   Word recognition score is good for the right ear (84%) and excellent for the left ear (92%),        Compared to 2020 audiogram, results are essentially stable, but a progression of loss is noted compared to 2016.      Follow up with Dr. Ohara.   Patient is considered to be a hearing aid candidate and a hearing aid evaluation is suggested to further explore amplification options.          No future appointments.

## 2024-06-11 NOTE — TELEPHONE ENCOUNTER
Patient called requesting an order for an order a urine test. Per patient he needs a paper copy of the order.

## 2024-06-12 NOTE — TELEPHONE ENCOUNTER
Calling patient with interpretor id#[id number] Tommy - patient will come by office and  a copy of referral.    Comments: [facility]  Audiology Dept .  [address] [place] [address]  Number of Visits:3

## 2024-06-14 ENCOUNTER — TELEPHONE (OUTPATIENT)
Dept: FAMILY MEDICINE CLINIC | Facility: CLINIC | Age: 74
End: 2024-06-14

## 2024-06-14 DIAGNOSIS — Z01.10 ENCOUNTER FOR HEARING EXAMINATION, UNSPECIFIED WHETHER ABNORMAL FINDINGS: ICD-10-CM

## 2024-06-14 DIAGNOSIS — H93.13 TINNITUS OF BOTH EARS: Primary | ICD-10-CM

## 2024-06-14 NOTE — TELEPHONE ENCOUNTER
Patient came in stating that he needs the following information on his referral for [facility]     \" Hearing evaluation to audiology dept\"  \"Tinnitus\"     Please call patient regarding his referral and the following comments thank you!       You can fax the referral to [phone]

## 2024-06-15 NOTE — TELEPHONE ENCOUNTER
CS - see other TE, patient to  from     1. Tinnitus of both ears    - Audiology Referral - In Network    2. Encounter for hearing examination, unspecified whether abnormal findings    - Audiology Referral - In Network

## 2024-06-19 NOTE — TELEPHONE ENCOUNTER
Patient calling to state already took referral to audiology office, and was told needed a script, or authorization signed by doctor, not a referral. Appointment is on 06/20/24. To be faxed to [phone]. Asking for call back.

## 2024-06-20 NOTE — TELEPHONE ENCOUNTER
Patient in office stating referral was not accepted by Audiologist. Patient was told they are not requesting a referral but a script signed stating need for hearing evaluation to audiology department and tinnitus. Informed patient previous referral canceled and marked as duplicate per [facility]. Attempted to contact audiology department [phone] to clarify what is needed, left message to call back.      please advise, patient is still requesting changes to referral: hearing evaluation to audiology department and tinnitus. Previously completed on canceled referral 6/15.

## 2024-06-20 NOTE — TELEPHONE ENCOUNTER
Mara from [facility] Aud[facility] Department is requesting the referral for the patient trying to schedule an appointment    FAX # [phone]  Phone     It is mentioned the referral received notes ENT and should only be the Audiology not ENTR

## 2024-07-21 DIAGNOSIS — E78.00 HIGH CHOLESTEROL: ICD-10-CM

## 2024-07-24 RX ORDER — SIMVASTATIN 20 MG
20 TABLET ORAL NIGHTLY
Qty: 90 TABLET | Refills: 3 | Status: SHIPPED | OUTPATIENT
Start: 2024-07-24

## 2024-07-24 NOTE — TELEPHONE ENCOUNTER
Refill Per Protocol     Requested Prescriptions   Pending Prescriptions Disp Refills    SIMVASTATIN 20 MG Oral Tab [Pharmacy Med Name: SIMVASTATIN 20MG TABLETS] 90 tablet 3     Sig: TAKE 1 TABLET(20 MG) BY MOUTH EVERY NIGHT       Cholesterol Medication Protocol Passed - 7/21/2024  6:04 AM        Passed - ALT < 80     Lab Results   Component Value Date    ALT 28 02/26/2024             Passed - ALT resulted within past year        Passed - Lipid panel within past 12 months     Lab Results   Component Value Date    CHOLEST 165 02/26/2024    TRIG 156 (H) 02/26/2024    HDL 50 02/26/2024    LDL 88 02/26/2024    VLDL 25 02/26/2024    TCHDLRATIO 2.7 02/25/2022    NONHDLC 115 02/26/2024             Passed - In person appointment or virtual visit in the past 12 mos or appointment in next 3 mos     Recent Outpatient Visits              4 months ago Varicose veins    [facility], Cristal Schneider MD    Office Visit    4 months ago Encounter for annual health examination    [facility]    [facility] Visit    7 months ago Trigger middle finger of right hand    [facility] [place], Ellen, [place]    Office Visit    8 months ago Trigger middle finger of right hand    [address] Vera Nevarez [place]    Office Visit    10 months ago Age-related nuclear cataract of both eyes    [facility], Shabbir Heller MD    Office Visit                               Recent Outpatient Visits              4 months ago Varicose veins    [facility]    Office Visit    4 months ago Encounter for annual health examination    [facility]    Office Visit    7 months ago Trigger middle finger of right hand    [facility]    Office Visit    8 months ago Trigger middle finger of right hand    [facility], [address] [place] [address], [place] Ar[place]a [place]    Office Visit    10 months ago Age-related nuclear cataract of both eyes    [facility], [place]    Office Visit

## 2024-08-23 ENCOUNTER — NURSE TRIAGE (OUTPATIENT)
Dept: FAMILY MEDICINE CLINIC | Facility: CLINIC | Age: 74
End: 2024-08-23

## 2024-08-23 NOTE — TELEPHONE ENCOUNTER
Action Requested: Summary for Provider     []  Critical Lab, Recommendations Needed  [] Need Additional Advice  []   FYI    []   Need Orders  [] Need Medications Sent to Pharmacy  []  Other     SUMMARY: patient requesting office visit for rash/skin discoloration    With assist of [employer] Line #[id number] spoke with patient.  Per patient has rash or sores on back on my neck.  Patient states he had discolorations in the past and had them removed for possible cancer and he is concerned these spots are the same.    Per patient spots on neck have been there for 6 months and on back for longer.    Patient denies: drainage from site, pain, fever    .Patient reports: \"sometimes mild itching\"    Patient asking for office visit and first opening is in October.  Patient requesting to be seen sooner.  Can patient be added to RES 24?  Please advise.         Reason for call: No chief complaint on file.  Onset: Data Unavailable                     Reason for Disposition   Localized rash present > 7 days    Protocols used: Rash or Redness - [id number]

## 2024-08-26 DIAGNOSIS — I10 ESSENTIAL HYPERTENSION: ICD-10-CM

## 2024-08-27 RX ORDER — AMLODIPINE AND BENAZEPRIL HYDROCHLORIDE 10; 40 MG/1; MG/1
1 CAPSULE ORAL DAILY
Qty: 90 CAPSULE | Refills: 3 | Status: SHIPPED | OUTPATIENT
Start: 2024-08-27

## 2024-08-27 NOTE — TELEPHONE ENCOUNTER
Patient calling, he did not hear from someone so he was calling back to check on status on when he can be seen.     Offered several appointments sooner but he declined, he wants to see Dr. Mckeon only.    Scheduled for below  Future Appointments   Date Time Provider Department Center   9/3/2024  9:00 AM Cristal Mckeon MD [facility]     Advised if symptoms worsen or he would like to be seen sooner to call us back and we can schedule him with partner sooner for eval.     Patient states understanding and agreeable.

## 2024-08-27 NOTE — TELEPHONE ENCOUNTER
Refill passed per [facility] protocol.  Requested Prescriptions   Pending Prescriptions Disp Refills    AMLODIPINE BESY-BENAZEPRIL HCL 10-40 MG Oral Cap [Pharmacy Med Name: AMLODIPINE-BENAZ 10/40MG CAPSULES] 90 capsule 3     Sig: Take 1 capsule by mouth daily.       Hypertension Medications Protocol Passed - 8/26/2024 11:52 AM        Passed - CMP or BMP in past 12 months        Passed - Last BP reading less than 140/90     BP Readings from Last 1 Encounters:   03/11/24 131/76               Passed - In person appointment or virtual visit in the past 12 mos or appointment in next 3 mos     Recent Outpatient Visits              5 months ago Varicose veins    [facility], Cristal Schneider MD    Office Visit    6 months ago Encounter for annual health examination    [facility], Cristal Schneider MD    Office Visit    8 months ago Trigger middle finger of right hand    [facility] [place] Kohlmann, Ellen, PA    Office Visit    9 months ago Trigger middle finger of right hand    [address] Vera Armenta PA    Office Visit    11 months ago Age-related nuclear cataract of both eyes    [facility] Shabbir Kevin MD    Office Visit          Future Appointments         Provider Department Appt Notes    In 1 week Cristal Mckeon MD [facility] ok per Md add on see TE                    Passed - EGFRCR or GFRNAA > 50     GFR Evaluation  EGFRCR: 87 , resulted on 2/26/2024            CLOTRIMAZOLE-BETAMETHASONE 1-0.05 % External Cream [Pharmacy Med Name: CLOTRIMAZOLE-BETAMETHASONE CRM 15GM] 60 g 0     Sig: APPLY TOPICALLY TO THE AFFECTED AREA TWICE DAILY AS NEEDED       There is no refill protocol information for this order        Recent Outpatient Visits              5 months ago  Varicose veins    [facility], Cristal Schneider MD    Office Visit    6 months ago Encounter for annual health examination    [facility], Cristal Schneider MD    Office Visit    8 months ago Trigger middle finger of right hand    [facility] [place] Kohlmann, Ellen, [place]    Office Visit    9 months ago Trigger middle finger of right hand    [address] Vera Armenta PA    Office Visit    11 months ago Age-related nuclear cataract of both eyes    [facility] Shabbir Kevin [facility]    Office Visit          Future Appointments         Provider Department Appt Notes    In 1 week Cristal Mckeon MD [facility], Jose ok per Md add on see TE

## 2024-08-27 NOTE — TELEPHONE ENCOUNTER
Please review; protocol failed/ has no protocol    Requested Prescriptions   Pending Prescriptions Disp Refills    clotrimazole-betamethasone 1-0.05 % External Cream [Pharmacy Med Name: CLOTRIMAZOLE-BETAMETHASONE CRM 15GM] 60 g 0     Sig: Apply 1 Application topically 2 (two) times daily as needed.       There is no refill protocol information for this order      Signed Prescriptions Disp Refills    amLODIPine Besy-Benazepril HCl 10-40 MG Oral Cap 90 capsule 3     Sig: Take 1 capsule by mouth daily.       Hypertension Medications Protocol Passed - 8/26/2024 11:52 AM        Passed - CMP or BMP in past 12 months        Passed - Last BP reading less than 140/90     BP Readings from Last 1 Encounters:   03/11/24 131/76               Passed - In person appointment or virtual visit in the past 12 mos or appointment in next 3 mos     Recent Outpatient Visits              5 months ago Varicose veins    [facility] Cristal Schneider MD    Office Visit    6 months ago Encounter for annual health examination    [facility] Cristal Schneider MD    Office Visit    8 months ago Trigger middle finger of right hand    [facility] [place] Kohl[place], Ellen, PA    Office Visit    9 months ago Trigger middle finger of right hand    [address] Vera Nevarez PA    Office Visit    11 months ago Age-related nuclear cataract of both eyes    [facility] Shabbir Heller MD    Office Visit          Future Appointments         Provider Department Appt Notes    In 1 week Cristal Mckeon MD [facility] ok per Md add on see TE                    Passed - EGFRCR or GFRNAA > 50     GFR Evaluation  EGFRCR: 87 , resulted on 2/26/2024             Recent Outpatient Visits              5 months ago [facility]icose veins     [facility]    [facility] Visit    6 months ago Encounter for annual health examination    [facility], Cristal Schneider MD    Office Visit    8 months ago Trigger middle finger of right hand    [facility] [place] Kohlmann, Ellen, [place]    Office Visit    9 months ago Trigger middle finger of right hand    [address]    Office Visit    11 months ago Age-related nuclear cataract of both eyes    [facility]    Office Visit          Future Appointments         Provider Department Appt Notes    In 1 week Cristal Mckeon MD [facility], [place] ok per Md add on see TE

## 2024-08-29 RX ORDER — CLOTRIMAZOLE AND BETAMETHASONE DIPROPIONATE 10; .64 MG/G; MG/G
1 CREAM TOPICAL 2 TIMES DAILY PRN
Qty: 60 G | Refills: 0 | Status: SHIPPED | OUTPATIENT
Start: 2024-08-29

## 2024-09-03 ENCOUNTER — OFFICE VISIT (OUTPATIENT)
Dept: FAMILY MEDICINE CLINIC | Facility: CLINIC | Age: 74
End: 2024-09-03

## 2024-09-03 VITALS
HEART RATE: 75 BPM | WEIGHT: 173 LBS | BODY MASS INDEX: 26 KG/M2 | SYSTOLIC BLOOD PRESSURE: 132 MMHG | DIASTOLIC BLOOD PRESSURE: 70 MMHG

## 2024-09-03 DIAGNOSIS — R03.0 ELEVATED BLOOD PRESSURE READING: ICD-10-CM

## 2024-09-03 DIAGNOSIS — D22.9 CHANGE IN MOLE: Primary | ICD-10-CM

## 2024-09-03 DIAGNOSIS — L82.1 SEBORRHEIC KERATOSES: ICD-10-CM

## 2024-09-03 PROCEDURE — 3078F DIAST BP <80 MM HG: CPT | Performed by: FAMILY MEDICINE

## 2024-09-03 PROCEDURE — 1159F MED LIST DOCD IN RCRD: CPT | Performed by: FAMILY MEDICINE

## 2024-09-03 PROCEDURE — 3075F SYST BP GE 130 - 139MM HG: CPT | Performed by: FAMILY MEDICINE

## 2024-09-03 PROCEDURE — 1126F AMNT PAIN NOTED NONE PRSNT: CPT | Performed by: FAMILY MEDICINE

## 2024-09-03 PROCEDURE — G2211 COMPLEX E/M VISIT ADD ON: HCPCS | Performed by: FAMILY MEDICINE

## 2024-09-03 PROCEDURE — 99214 OFFICE O/P EST MOD 30 MIN: CPT | Performed by: FAMILY MEDICINE

## 2024-09-03 PROCEDURE — 1160F RVW MEDS BY RX/DR IN RCRD: CPT | Performed by: FAMILY MEDICINE

## 2024-09-03 NOTE — PROGRESS NOTES
Chief Complaint   Patient presents with    Derm Problem     Concerned w/ itchy lesion on neck x 1 year that has not healed     HPI:   Dean Daily is a 73 year old male who presents to clinic with complaints of skin changes.   Patient has noticed more plaque-like lesions on his back as well as back itching.  Does not moisturize his skin.  Also has noticed a change in the mole on his left anterior neck.  It has been present for over a year and is often very itchy.    REVIEW OF SYSTEMS:   Negative, except per HPI.     EXAM:   /75 (BP Location: Right arm, Patient Position: Sitting, Cuff Size: adult)   Pulse 75   Wt 173 lb (78.5 kg)   BMI 25.55 kg/m²   Body mass index is 25.55 kg/m².  GENERAL: well developed, well nourished, in no apparent distress  SKIN: + Multiple velvety plaques scattered over patient's back, left anterior neck with brown mole with little white inclusion  HEENT: atraumatic, normocephalic  EYES: PERRLA, EOMI,conjunctiva are clear  NECK: supple, no adenopathy    ASSESSMENT AND PLAN:     1. Change in mole  - ant L side neck needs eval and biopsy as appropriate.  - DERM - INTERNAL  - Derm Referral - In Network    2. Seborrheic keratoses  - reassurance  - DERM - INTERNAL  - Derm Referral - In Network    3. Elevated blood pressure reading  Blood pressure elevated to 160/75 and improved upon retake.  Patient is consistent with taking his amlodipine benazepril     RTC if no improvement in symptoms. Red flags discussed to go to ER.     Cristal Mckeon MD  9/3/2024  9:01 AM

## 2024-09-06 ENCOUNTER — OFFICE VISIT (OUTPATIENT)
Dept: DERMATOLOGY CLINIC | Facility: CLINIC | Age: 74
End: 2024-09-06

## 2024-09-06 DIAGNOSIS — L82.1 SEBORRHEIC KERATOSIS: Primary | ICD-10-CM

## 2024-09-06 PROCEDURE — 99213 OFFICE O/P EST LOW 20 MIN: CPT | Performed by: PHYSICIAN ASSISTANT

## 2024-09-06 PROCEDURE — 17110 DESTRUCTION B9 LES UP TO 14: CPT | Performed by: PHYSICIAN ASSISTANT

## 2024-09-06 PROCEDURE — 1159F MED LIST DOCD IN RCRD: CPT | Performed by: PHYSICIAN ASSISTANT

## 2024-09-06 NOTE — PROGRESS NOTES
HPI:    Patient ID: Dean Daily is a 73 year old male.    Patient presents for a spot of concern for the past 2 years on the anterior neck and on the lower back that are recently irritated and itchy. No draining or tenderness noted. Personal hx of BCC (2008) right pretibilal lower leg. No allergies to medications noted.     Review of Systems   Constitutional:  Negative for chills and fever.   Musculoskeletal:  Negative for arthralgias and myalgias.   Skin:  Positive for rash. Negative for color change and wound.            Current Outpatient Medications   Medication Sig Dispense Refill   • clotrimazole-betamethasone 1-0.05 % External Cream Apply 1 Application topically 2 (two) times daily as needed. 60 g 0   • amLODIPine Besy-Benazepril HCl 10-40 MG Oral Cap Take 1 capsule by mouth daily. 90 capsule 3   • simvastatin 20 MG Oral Tab Take 1 tablet (20 mg total) by mouth nightly. 90 tablet 3   • diclofenac 1 % External Gel Apply 2 g topically 4 (four) times daily. 100 g 0   • Sildenafil Citrate (VIAGRA) 100 MG Oral Tab Take 1 tablet (100 mg total) by mouth as needed for Erectile Dysfunction. 10 tablet 1   • melatonin 5 MG Oral Cap Take 1 capsule (5 mg total) by mouth as needed.     • ibuprofen 800 MG Oral Tab Take 1 tablet (800 mg total) by mouth every 8 (eight) hours as needed for Pain. 90 tablet 1   • levocetirizine 5 MG Oral Tab Take 1 tablet (5 mg total) by mouth every evening. 30 tablet 0   • hydrocortisone 2.5 % External Cream Apply 1 Application topically 2 (two) times daily. 28 g 1   • cyanocobalamine 100 MCG Oral Tab Take 2.5 tablets (250 mcg total) by mouth daily.     • Multiple Vitamins-Minerals (CENTRUM VITAMINTS OR) Take 1 tablet by mouth daily.     • lidocaine 4 % External Patch Place 1 patch onto the skin daily. (Patient not taking: Reported on 9/6/2024) 15 patch 0   • lidocaine 5 % External Patch Place 1 patch onto the skin daily. (Patient not taking: Reported on 9/3/2024) 15 patch 0   • Lidocaine  HCl 0.5 % External Gel Apply 1 Application topically daily. (Patient not taking: Reported on 9/3/2024) 237 g 0     Allergies:No Known Allergies   There were no vitals taken for this visit.  There is no height or weight on file to calculate BMI.  PHYSICAL EXAM:   Physical Exam  Constitutional:       General: He is not in acute distress.     Appearance: Normal appearance.   Skin:     General: Skin is warm and dry.      Findings: Rash present.      Comments: Sks noted on the neck and back. Tan and brown lesions stuck on the back. No draining or tenderness noted.    Neurological:      Mental Status: He is alert and oriented to person, place, and time.              ASSESSMENT/PLAN:   1. Seborrheic keratosis  -After discussion with patient, advised the following:  - Cryosurgery of non-malignant lesion(s)  - Risks, benefits, alternatives and personnel required for cryosurgery reviewed with patient. Discussed the expected redness, as well as the risks of swelling, blistering, crusting, pigmentary changes, and permanent scarring. . Discussed that lesion may need additional treatments in future or may recur. Pt verbalizes understanding and wishes to proceed.   - Cryosurgery performed with Liquid Nitrogen via cryostat spray gun to Seborrheic Keratosis. 2 lesion(s) treated.   - Patient tolerated well and wound care discussed.   -Pt was agreeable to plan and will comply with discussion above.           No orders of the defined types were placed in this encounter.      Meds This Visit:  Requested Prescriptions      No prescriptions requested or ordered in this encounter       Imaging & Referrals:  None         ID#[id number]

## 2024-12-29 DIAGNOSIS — Z00.00 ENCOUNTER FOR ANNUAL HEALTH EXAMINATION: ICD-10-CM

## 2024-12-29 DIAGNOSIS — M48.061 SPINAL STENOSIS OF LUMBAR REGION, UNSPECIFIED WHETHER NEUROGENIC CLAUDICATION PRESENT: ICD-10-CM

## 2025-01-02 NOTE — PROGRESS NOTES
Subjective:   Dean Daily is a 74 year old male who presents for Urinary Frequency (Past 2 months,)   Patient is a pleasant 74-year-old male past medical Struve significant for hypertension, hyperlipidemia, basal cell carcinoma, bilateral cataracts, OA, sleep apnea, and prediabetes.  Patient presents to office today new to this provider for evaluation of urgency is passing quickly or unable to hold it in.  Patient states think he has a prostate issues. He has urgency to go to the bathroom. This has started about 2 months. He leaks at times and is incontinent. Stream is ok. Still normal. Gets up at night 2-3 times per night. UA in office negative. He has no issue initiating stream He feels like he is straining to complete. He reports unsure if he fully empties bladder. Has to void every 30 mins.         Past Medical History:    Basal cell carcinoma    Right Lateral Leg    Blepharitis    Bilateral    Cataract     Bilateral    Essential hypertension    Herpes zoster without complication    High blood pressure    High cholesterol    Hyperlipidemia    Meibomian gland dysfunction    Bilateral    Presbyopia    Bilateral    Pyogenic granuloma    Per next gen \"RLL\"    Vitreous floaters      Past Surgical History:   Procedure Laterality Date    Colonoscopy N/A 5/19/2017    Procedure: COLONOSCOPY;  Surgeon: Kevin Tompkins MD;  Location: [facility] ENDOSCOPY    Colonoscopy N/A 8/22/2022    Procedure: COLONOSCOPY;  Surgeon: Kevin Tompkins MD;  Location: [facility] ENDO    Ir epidural steriod injection  2009    epidural steroid injection to back x 1    Other surgical history      Arthrocentesis of the right hip trochanteric bursa    Skin surgery  2007    Leg, Skin cancer, Surgically removed- no othe treatment         History/Other:    Chief Complaint Reviewed and Verified  Nursing Notes Reviewed and   Verified  Tobacco Reviewed  Allergies Reviewed  Medications Reviewed    Problem List Reviewed  Medical History Reviewed   Surgical History   Reviewed  Family History Reviewed  Social History Reviewed         Tobacco:  He has never smoked tobacco.    Current Outpatient Medications   Medication Sig Dispense Refill    tamsulosin 0.4 MG Oral Cap Take 1 capsule (0.4 mg total) by mouth at bedtime. 30 capsule 0    clotrimazole-betamethasone 1-0.05 % External Cream Apply 1 Application topically 2 (two) times daily as needed. 60 g 0    amLODIPine Besy-Benazepril HCl 10-40 MG Oral Cap Take 1 capsule by mouth daily. 90 capsule 3    simvastatin 20 MG Oral Tab Take 1 tablet (20 mg total) by mouth nightly. 90 tablet 3    Sildenafil Citrate (VIAGRA) 100 MG Oral Tab Take 1 tablet (100 mg total) by mouth as needed for Erectile Dysfunction. 10 tablet 1    ibuprofen 800 MG Oral Tab Take 1 tablet (800 mg total) by mouth every 8 (eight) hours as needed for Pain. 90 tablet 1    hydrocortisone 2.5 % External Cream Apply 1 Application topically 2 (two) times daily. 28 g 1    Multiple Vitamins-Minerals (CENTRUM VITAMINTS OR) Take 1 tablet by mouth daily.      diclofenac 1 % External Gel Apply 2 g topically 4 (four) times daily. (Patient not taking: Reported on 1/3/2025) 100 g 0    melatonin 5 MG Oral Cap Take 1 capsule (5 mg total) by mouth as needed. (Patient not taking: Reported on 1/3/2025)      lidocaine 4 % External Patch Place 1 patch onto the skin daily. (Patient not taking: Reported on 1/3/2025) 15 patch 0    lidocaine 5 % External Patch Place 1 patch onto the skin daily. (Patient not taking: Reported on 1/3/2025) 15 patch 0    levocetirizine 5 MG Oral Tab Take 1 tablet (5 mg total) by mouth every evening. (Patient not taking: Reported on 1/3/2025) 30 tablet 0    Lidocaine HCl 0.5 % External Gel Apply 1 Application topically daily. (Patient not taking: Reported on 1/3/2025) 237 g 0    cyanocobalamine 100 MCG Oral Tab Take 2.5 tablets (250 mcg total) by mouth daily. (Patient not taking: Reported on 1/3/2025)           Review of Systems:  Review of  Systems   Constitutional:  Negative for chills and fever.   Genitourinary:  Positive for decreased urine volume, difficulty urinating, frequency and urgency. Negative for dysuria.   Musculoskeletal:  Negative for back pain.   All other systems reviewed and are negative.        Objective:   /68 (BP Location: Right arm, Patient Position: Sitting, Cuff Size: adult)   Pulse 79   Ht 5' 9\" (1.753 m)   Wt 175 lb 6.4 oz (79.6 kg)   SpO2 96%   BMI 25.90 kg/m²  Estimated body mass index is 25.9 kg/m² as calculated from the following:    Height as of this encounter: 5' 9\" (1.753 m).    Weight as of this encounter: 175 lb 6.4 oz (79.6 kg).  Physical Exam  Vitals and nursing note reviewed.   Constitutional:       Appearance: Normal appearance. He is normal weight.   Cardiovascular:      Rate and Rhythm: Normal rate and regular rhythm.      Pulses: Normal pulses.      Heart sounds: Normal heart sounds. No murmur heard.  Pulmonary:      Effort: Pulmonary effort is normal.      Breath sounds: Normal breath sounds.   Abdominal:      General: Abdomen is flat. There is no distension.      Palpations: Abdomen is soft. There is no mass.      Tenderness: There is no abdominal tenderness.   Skin:     Capillary Refill: Capillary refill takes less than 2 seconds.   Neurological:      Mental Status: He is alert.           Assessment & Plan:   1. Urinary frequency (Primary)  -     URINALYSIS, AUTO, W/O SCOPE  -     Urology Referral - In Network  -     PSA, Total W Reflex To Free  -     Tamsulosin HCl; Take 1 capsule (0.4 mg total) by mouth at bedtime.  Dispense: 30 capsule; Refill: 0  2. Urge incontinence of urine  -     URINALYSIS, AUTO, W/O SCOPE  -     Urology Referral - In Network    Urinary frequency and urgency with incontinence  UA negative  Trouble maintaining stream with nocturia  Not completely emptying bladder  Referral to uro for PVR  Start trial tamsulosin 0.4 once nightly  Psa normal 2023  Recheck today    Patient  aware of plan of care. All questions answered to satisfaction of the patient. Patient instructed to call office or reach out via [employer] if any issues arise. For urgent issues and/or reviewed red flags please proceed to the urgent care or ER.  Also, inform the nurse practitioner with any new symptoms or medication side effects.      Return for Annual physical.    Kevin Clarke, CLOVIS, 1/2/2025, 12:16 PM

## 2025-01-03 ENCOUNTER — LAB ENCOUNTER (OUTPATIENT)
Dept: LAB | Age: 75
End: 2025-01-03
Payer: MEDICARE

## 2025-01-03 ENCOUNTER — OFFICE VISIT (OUTPATIENT)
Dept: FAMILY MEDICINE CLINIC | Facility: CLINIC | Age: 75
End: 2025-01-03

## 2025-01-03 VITALS
OXYGEN SATURATION: 96 % | BODY MASS INDEX: 25.98 KG/M2 | WEIGHT: 175.38 LBS | HEART RATE: 76 BPM | DIASTOLIC BLOOD PRESSURE: 74 MMHG | HEIGHT: 69 IN | SYSTOLIC BLOOD PRESSURE: 131 MMHG

## 2025-01-03 DIAGNOSIS — R35.0 URINARY FREQUENCY: Primary | ICD-10-CM

## 2025-01-03 DIAGNOSIS — N39.41 URGE INCONTINENCE OF URINE: ICD-10-CM

## 2025-01-03 LAB
APPEARANCE: CLEAR
BILIRUBIN: NEGATIVE
GLUCOSE (URINE DIPSTICK): NEGATIVE MG/DL
KETONES (URINE DIPSTICK): NEGATIVE MG/DL
LEUKOCYTES: NEGATIVE
MULTISTIX LOT#: ABNORMAL NUMERIC
NITRITE, URINE: NEGATIVE
PH, URINE: 6 (ref 4.5–8)
PROTEIN (URINE DIPSTICK): NEGATIVE MG/DL
SPECIFIC GRAVITY: 1.03 (ref 1–1.03)
URINE-COLOR: YELLOW
UROBILINOGEN,SEMI-QN: 0.2 MG/DL (ref 0–1.9)

## 2025-01-03 PROCEDURE — 84154 ASSAY OF PSA FREE: CPT

## 2025-01-03 PROCEDURE — 84153 ASSAY OF PSA TOTAL: CPT

## 2025-01-03 PROCEDURE — 36415 COLL VENOUS BLD VENIPUNCTURE: CPT

## 2025-01-03 RX ORDER — TAMSULOSIN HYDROCHLORIDE 0.4 MG/1
0.4 CAPSULE ORAL NIGHTLY
Qty: 30 CAPSULE | Refills: 0 | Status: SHIPPED | OUTPATIENT
Start: 2025-01-03 | End: 2025-02-02

## 2025-01-03 RX ORDER — IBUPROFEN 800 MG/1
800 TABLET, FILM COATED ORAL EVERY 8 HOURS PRN
Qty: 90 TABLET | Refills: 1 | Status: SHIPPED | OUTPATIENT
Start: 2025-01-03

## 2025-01-03 NOTE — TELEPHONE ENCOUNTER
Refill passed per [facility] protocol.    Requested Prescriptions   Pending Prescriptions Disp Refills    IBUPROFEN 800 MG Oral Tab [Pharmacy Med Name: IBUPROFEN 800MG TABLETS] 90 tablet 1     Sig: TAKE 1 TABLET(800 MG) BY MOUTH EVERY 8 HOURS AS NEEDED FOR PAIN       Non-Narcotic Pain Medication Protocol Passed - 1/3/2025  3:08 PM        Passed - In person appointment or virtual visit in the past 6 mos or appointment in next 3 mos     Recent Outpatient Visits              Today Ur[facility]    Office Visit    3 months ago Seborrheic keratosis    [facility] Fab[facility] Wu PA-C    Office Visit    4 months ago Change in mole    [facility] [place] Anastasia, MD    Office Visit    9 months ago Varicose veins    [facility]    Office Visit    10 months ago Encounter for annual health examination    [facility]astasia[facility]    Office Visit          Future Appointments         Provider Department Appt Notes    In 1 month Cristal Mckeon MD [facility] [address], [place] medicare physical/last physical 2-26-24 policy informed to pt

## 2025-01-04 DIAGNOSIS — Z00.00 ENCOUNTER FOR ANNUAL HEALTH EXAMINATION: ICD-10-CM

## 2025-01-04 DIAGNOSIS — M48.061 SPINAL STENOSIS OF LUMBAR REGION, UNSPECIFIED WHETHER NEUROGENIC CLAUDICATION PRESENT: ICD-10-CM

## 2025-01-04 LAB
% FREE PSA: 14.8 %
% FREE PSA: 14.8 %
PROSTATE SPECIFIC AG: 3.3 NG/ML
PROSTATE SPECIFIC AG: 3.3 NG/ML
PSA, FREE: 0.49 NG/ML
PSA, FREE: 0.49 NG/ML

## 2025-01-04 NOTE — TELEPHONE ENCOUNTER
Pt is requesting refill for the following medication        IBUPROFEN 800 MG Oral Tab, TAKE 1 TABLET(800 MG) BY MOUTH EVERY 8 HOURS AS NEEDED FOR PAIN, Disp: 90 tablet, Rfl: 1

## 2025-01-06 NOTE — PROGRESS NOTES
Full Name & , verify w/ pt  Informed of below msg, pt understood and had no further questions     La prueba de próstata resultó normal.  No hay hallazgos preocupantes.  Continúe con la prueba de tamsulosina y realice un seguimiento con urología según sea necesario.    (Your prostate test came back normal.  No concerning findings.  Please continue with trial of tamsulosin and follow-up with urology as needed)

## 2025-01-08 RX ORDER — IBUPROFEN 800 MG/1
800 TABLET, FILM COATED ORAL EVERY 8 HOURS PRN
Qty: 90 TABLET | Refills: 1 | OUTPATIENT
Start: 2025-01-08

## 2025-01-22 NOTE — PROGRESS NOTES
[facility] Urology  Initial Office Consultation    HPI:   Dean Daily is a 74 year old male with PMHx of basal cell carcinoma, cataracts, HTN, BLAINE, and HLD here today for urinary frequency and nocturia.     Patient complains of nocturia. The patient also complains of erectile dysfunction. Was prescribed Sildenafil 100 mg PRN on 02/26/2024 by his PCP. He states it did not help at all and gives him headaches. States he has also tried Tadalafil in the past without benefit.  Nocturia x 4  Frequency q 1 hour  Duration:  6 months    denies symptoms of fever.  denies dysuria  denies hematuria.  complains of incontinence.   denies uti's    IPSS 20 (3/2/3/4/0/4/4) with QoL Score of 4. Bladder scan 133 mL, voiding approximately 30 minutes ago.    Water intake: 3-4 bottles of water  Caffeine intake:  1 cup of coffee per day, one tea per evening  Bowel function:  constipation    Prior Treatment:    Medications: Started on Tamsulosin 0.4 mg every day on 01/03/25. States it helped, however he stopped it because he was told to stop if symptoms improved.   Surgical therapies:  no    Family history   Prostate cancer:  no   Enlarged prostate:  no      PSA history:     PSA Screen   Latest Ref Rng <=4.00 ng/mL   12/08/2012 1.7   10/26/2013 1.9   10/25/2014 1.7   11/07/2015 2.1   11/29/2016 2.3   06/09/2018 2.6   6/23/2020 2.54    3/13/2023 2.91    01/03/2025 3.3          Past Medical History:    Basal cell carcinoma    Right Lateral Leg    Blepharitis    Bilateral    Cataract     Bilateral    Essential hypertension    Herpes zoster without complication    High blood pressure    High cholesterol    Hyperlipidemia    Meibomian gland dysfunction    Bilateral    Presbyopia    Bilateral    Pyogenic granuloma    Per next gen \"RLL\"    Vitreous floaters     Past Surgical History:   Procedure Laterality Date    Colonoscopy N/A 5/19/2017    Procedure: COLONOSCOPY;  Surgeon: Kevin Tompkins MD;  Location: [facility] ENDOSCOPY    Colonoscopy  N/A 2022    Procedure: COLONOSCOPY;  Surgeon: Kevin Tompkins MD;  Location: [facility] ENDO    Ir epidural steriod injection  2009    epidural steroid injection to back x 1    Other surgical history      Arthrocentesis of the right hip trochanteric bursa    Skin surgery      Leg, Skin cancer, Surgically removed- no othe treatment      Family History   Problem Relation Age of Onset    Hypertension Mother     Thyroid disease Mother     Heart Disease Mother 95            Cancer Sister         uterine cancer.    Other (Other) Father         good health    Diabetes Neg     Glaucoma Neg     Macular degeneration Neg      Social History     Socioeconomic History    Marital status:    Tobacco Use    Smoking status: Never     Passive exposure: Never    Smokeless tobacco: Never   Vaping Use    Vaping status: Never Used   Substance and Sexual Activity    Alcohol use: Yes     Alcohol/week: 0.0 standard drinks of alcohol     Comment: rarely    Drug use: No   Other Topics Concern    Caffeine Concern Yes     Comment: Daily; one cup, coffee    Reaction to local anesthetic No    Pt has a pacemaker No    Pt has a defibrillator No     Current Outpatient Medications   Medication Sig Dispense Refill    IBUPROFEN 800 MG Oral Tab TAKE 1 TABLET(800 MG) BY MOUTH EVERY 8 HOURS AS NEEDED FOR PAIN 90 tablet 1    tamsulosin 0.4 MG Oral Cap Take 1 capsule (0.4 mg total) by mouth at bedtime. 30 capsule 0    clotrimazole-betamethasone 1-0.05 % External Cream Apply 1 Application topically 2 (two) times daily as needed. 60 g 0    amLODIPine Besy-Benazepril HCl 10-40 MG Oral Cap Take 1 capsule by mouth daily. 90 capsule 3    simvastatin 20 MG Oral Tab Take 1 tablet (20 mg total) by mouth nightly. 90 tablet 3    diclofenac 1 % External Gel Apply 2 g topically 4 (four) times daily. (Patient not taking: Reported on 1/3/2025) 100 g 0    Sildenafil Citrate (VIAGRA) 100 MG Oral Tab Take 1 tablet (100 mg total) by mouth as needed for  Erectile Dysfunction. 10 tablet 1    melatonin 5 MG Oral Cap Take 1 capsule (5 mg total) by mouth as needed. (Patient not taking: Reported on 1/3/2025)      lidocaine 4 % External Patch Place 1 patch onto the skin daily. (Patient not taking: Reported on 1/3/2025) 15 patch 0    lidocaine 5 % External Patch Place 1 patch onto the skin daily. (Patient not taking: Reported on 1/3/2025) 15 patch 0    levocetirizine 5 MG Oral Tab Take 1 tablet (5 mg total) by mouth every evening. (Patient not taking: Reported on 1/3/2025) 30 tablet 0    Lidocaine HCl 0.5 % External Gel Apply 1 Application topically daily. (Patient not taking: Reported on 1/3/2025) 237 g 0    hydrocortisone 2.5 % External Cream Apply 1 Application topically 2 (two) times daily. 28 g 1    cyanocobalamine 100 MCG Oral Tab Take 2.5 tablets (250 mcg total) by mouth daily. (Patient not taking: Reported on 1/3/2025)      Multiple Vitamins-Minerals (CENTRUM VITAMINTS OR) Take 1 tablet by mouth daily.         Allergies: Patient has no known allergies.    REVIEW OF SYSTEMS:  Review of Systems   All other systems reviewed and are negative.        EXAM:  There were no vitals taken for this visit.    Physical Exam  Constitutional:       Appearance: Normal appearance.   HENT:      Head: Normocephalic and atraumatic.      Mouth/Throat:      Mouth: Mucous membranes are moist.   Pulmonary:      Effort: Pulmonary effort is normal.   Abdominal:      General: Abdomen is flat.      Palpations: Abdomen is soft.   Genitourinary:     Prostate: Enlarged. Not tender and no nodules present.   Skin:     General: Skin is warm and dry.   Neurological:      Mental Status: He is alert and oriented to person, place, and time.   Psychiatric:         Mood and Affect: Mood normal.         Thought Content: Thought content normal.          LABS:  Lab Results   Component Value Date    PSA 3.3 01/03/2025    TOTPSASCREEN 2.1 11/07/2015     Lab Results   Component Value Date    WBC 7.7 02/26/2024     RBC 4.60 02/26/2024    HGB 13.8 02/26/2024    HCT 42.3 02/26/2024    MCV 92.0 02/26/2024    MCH 30.0 02/26/2024    MCHC 32.6 02/26/2024    RDW 13.2 02/26/2024    .0 02/26/2024    MPV 8.2 06/09/2018     Lab Results   Component Value Date    GLU 94 02/26/2024    BUN 17 02/26/2024    BUNCREA 18.3 02/26/2024    CREATSERUM 0.93 02/26/2024    ANIONGAP 6 02/26/2024    GFRNAA 79 02/25/2022    GFRAA 92 02/25/2022    CA 9.3 02/26/2024     02/26/2024    K 4.4 02/26/2024     02/26/2024    CO2 28.0 02/26/2024     No results found for: \"PTP\", \"PT\", \"INR\"    IMAGING:  No results found.    IMPRESSION:  BPH with LUTS   Erectile Dyfunction    PLAN:  - Continue Tamsulosin 0.4 mg every day  - Start Tadalafil 10-20 mg every day PRN  - Bladder behaviors  - Follow-up in 8 weeks    CLOVIS Head  1/23/2025

## 2025-01-23 ENCOUNTER — OFFICE VISIT (OUTPATIENT)
Dept: SURGERY | Facility: CLINIC | Age: 75
End: 2025-01-23

## 2025-01-23 DIAGNOSIS — N40.1 BENIGN PROSTATIC HYPERPLASIA WITH LOWER URINARY TRACT SYMPTOMS, SYMPTOM DETAILS UNSPECIFIED: Primary | ICD-10-CM

## 2025-01-23 DIAGNOSIS — N52.9 ERECTILE DYSFUNCTION, UNSPECIFIED ERECTILE DYSFUNCTION TYPE: ICD-10-CM

## 2025-01-23 PROCEDURE — 1159F MED LIST DOCD IN RCRD: CPT

## 2025-01-23 PROCEDURE — 99214 OFFICE O/P EST MOD 30 MIN: CPT

## 2025-01-23 PROCEDURE — 1160F RVW MEDS BY RX/DR IN RCRD: CPT

## 2025-01-23 RX ORDER — TADALAFIL 20 MG/1
TABLET ORAL
Qty: 10 TABLET | Refills: 3 | Status: SHIPPED | OUTPATIENT
Start: 2025-01-23

## 2025-01-23 RX ORDER — TAMSULOSIN HYDROCHLORIDE 0.4 MG/1
0.4 CAPSULE ORAL DAILY
Qty: 90 CAPSULE | Refills: 3 | Status: SHIPPED | OUTPATIENT
Start: 2025-01-23 | End: 2026-01-18

## 2025-01-24 ENCOUNTER — TELEPHONE (OUTPATIENT)
Dept: SURGERY | Facility: CLINIC | Age: 75
End: 2025-01-24

## 2025-01-24 NOTE — TELEPHONE ENCOUNTER
Pt called.  Appointment 1-23-25.  Given two rx.  [place] only received one .  Please send and call pt.

## 2025-02-17 PROBLEM — Z87.39 HISTORY OF GOUT: Status: ACTIVE | Noted: 2020-07-06

## 2025-02-17 PROBLEM — H53.8 VISION BLURRING: Status: RESOLVED | Noted: 2023-03-13 | Resolved: 2025-02-17

## 2025-02-17 PROBLEM — H61.22 IMPACTED CERUMEN OF LEFT EAR: Status: RESOLVED | Noted: 2023-03-13 | Resolved: 2025-02-17

## 2025-02-17 NOTE — ASSESSMENT & PLAN NOTE
Blood pressure in office today  Encouraged healthy diet and exercise  Stable on amlodipine 10 benazepril 40 daily  Check CPM  CPM

## 2025-02-17 NOTE — PROGRESS NOTES
Subjective:   Dean Daily is a 74 year old male who presents for a MA AHA (Medicare Advantage Annual Health Assessment) and Subsequent Annual Wellness visit (Pt already had Initial Annual Wellness) and scheduled follow up of multiple significant but stable problems.     Patient is a pleasant 74-year-old male past medical Struve significant for hypertension, hyperlipidemia, basal cell carcinoma, bilateral cataracts, OA, sleep apnea, and prediabetes.  Patient presents to office today for medicare physical. Patient states his health via Ge #[id number]. Feels very good about his health.     Diet: Feels good. Mostly home cooked.   Exercise: Different things, does machines in basement. He walks and has rowing machines and has weights. He does this most days.   Sleep: good 7-8 hours per night. NO trouble falling asleep or staying asleep.   Stress: minimal. Likes to exercise when stressed.  Social: , 4 kids, 9 grandkids. [place]. Owns home.   Sexually Active: yes, has issues with ED, tadalafil 20 mg helps but not much.   Prophylaxis: N/A  Alcohol: none  Tobacco: none  Work: he is retired, used to work in .   Vaccinations: Flu received at 09/24 [employer].   Advanced directive: dicussed     Cataracts? Eye dr? Needs referral  Tinnitus? Saw specialist, recommended hearing aides. Unable to afford with insurance  Sleep apnea? Uses cpap every night  Having issues with constipation. He has had to use suppositories. 1-2 times per day Bms. Flax seed. Trial miralax.         History/Other:   Fall Risk Assessment:   He has been screened for Falls and is low risk.      Cognitive Assessment:   He had a completely normal cognitive assessment - see flowsheet entries       Functional Ability/Status:   Dean Daily has some abnormal functions as listed below:  He has Hearing problems based on screening of functional status.He has Vision problems based on screening of functional status.       Depression  Screening (PHQ):  PHQ-2 SCORE: 0  , done 2/18/2025   If you checked off any problems, how difficult have these problems made it for you to do your work, take care of things at home, or get along with other people?: Not difficult at all              Advanced Directives:   He does NOT have a Living Will. [Do you have a living will?: No]  He does NOT have a Power of  for Health Care. [Do you have a healthcare power of ?: No]  Discussed Advance Care Planning with patient (and family/surrogate if present). Standard forms made available to patient in After Visit Summary.      Patient Active Problem List   Diagnosis    Essential hypertension    Sleep apnea    Prediabetes    Unspecified inflammatory spondylopathy, lumbar region    Sensorineural hearing loss, bilateral    History of gout    History of basal cell carcinoma (BCC)    Wears glasses    Tinnitus of both ears    Age-related nuclear cataract of both eyes    Vitreous floaters of both eyes     Allergies:  He has No Known Allergies.    Current Medications:  Outpatient Medications Marked as Taking for the 2/18/25 encounter (Office Visit) with Kevin Clarke APRN   Medication Sig    tamsulosin 0.4 MG Oral Cap Take 1 capsule (0.4 mg total) by mouth daily. Take 1/2 hour following the same meal each day    Tadalafil 20 MG Oral Tab Take 0.5-1 tablets (10-20 mg total) by mouth daily as needed for Erectile Dysfunction.    IBUPROFEN 800 MG Oral Tab TAKE 1 TABLET(800 MG) BY MOUTH EVERY 8 HOURS AS NEEDED FOR PAIN    clotrimazole-betamethasone 1-0.05 % External Cream Apply 1 Application topically 2 (two) times daily as needed.    amLODIPine Besy-Benazepril HCl 10-40 MG Oral Cap Take 1 capsule by mouth daily.    simvastatin 20 MG Oral Tab Take 1 tablet (20 mg total) by mouth nightly.    hydrocortisone 2.5 % External Cream Apply 1 Application topically 2 (two) times daily.    Multiple Vitamins-Minerals (CENTRUM VITAMINTS OR) Take 1 tablet by mouth daily.        Medical History:  He  has a past medical history of Basal cell carcinoma (07/15/2008), Blepharitis (1998), Cataract (2010), Essential hypertension, Herpes zoster without complication (08/15/2020), High blood pressure, High cholesterol, Hyperlipidemia, Impacted cerumen of left ear (03/13/2023), Meibomian gland dysfunction (1998), Presbyopia (1998), Pyogenic granuloma (1998), and Vitreous floaters (2010).  Surgical History:  He  has a past surgical history that includes ir epidural steriod injection (2009); skin surgery (2007); other surgical history; colonoscopy (N/A, 5/19/2017); and colonoscopy (N/A, 8/22/2022).   Family History:  His family history includes Cancer in his sister; Heart Disease (age of onset: 95) in his mother; Hypertension in his mother; Other in his father; Thyroid disease in his mother.  Social History:  He  reports that he has never smoked. He has never been exposed to tobacco smoke. He has never used smokeless tobacco. He reports that he does not currently use alcohol. He reports that he does not use drugs.    Tobacco:  He has never smoked tobacco.    CAGE Alcohol Screen:   CAGE screening score of 0 on 2/18/2025, showing low risk of alcohol abuse.      Patient Care Team:  Cristal Mckeon MD as PCP - General (Family Medicine)  Kush Rodriguez MD (Anesthesiology)  Armando Ramírez MD (SURGERY, ORTHOPEDIC)    Review of Systems   Constitutional: Negative.  Negative for activity change, chills and fever.   HENT: Negative.  Negative for congestion, ear pain, postnasal drip, sinus pain, sore throat and trouble swallowing.    Respiratory: Negative.  Negative for cough, shortness of breath and wheezing.    Cardiovascular: Negative.  Negative for chest pain and leg swelling.   Gastrointestinal:  Positive for constipation. Negative for abdominal pain, blood in stool, diarrhea and vomiting.   Endocrine: Negative.    Genitourinary: Negative.  Negative for difficulty urinating, dysuria and flank pain.    Musculoskeletal: Negative.  Negative for arthralgias, back pain and neck stiffness.   Skin: Negative.  Negative for color change and rash.   Neurological: Negative.  Negative for dizziness and headaches.   Hematological:  Negative for adenopathy.         Objective:   Physical Exam  Vitals and nursing note reviewed.   Constitutional:       Appearance: Normal appearance. He is normal weight.   HENT:      Head: Normocephalic.      Right Ear: Tympanic membrane, ear canal and external ear normal. There is no impacted cerumen.      Left Ear: Tympanic membrane, ear canal and external ear normal. There is no impacted cerumen.      Nose: Nose normal. No congestion or rhinorrhea.      Mouth/Throat:      Mouth: Mucous membranes are moist.      Pharynx: No oropharyngeal exudate or posterior oropharyngeal erythema.   Eyes:      Extraocular Movements: Extraocular movements intact.      Pupils: Pupils are equal, round, and reactive to light.   Cardiovascular:      Rate and Rhythm: Normal rate and regular rhythm.      Pulses: Normal pulses.      Heart sounds: Normal heart sounds. No murmur heard.  Pulmonary:      Effort: Pulmonary effort is normal. No respiratory distress.      Breath sounds: Normal breath sounds. No wheezing.   Abdominal:      General: There is no distension.      Palpations: Abdomen is soft.      Tenderness: There is no abdominal tenderness.   Musculoskeletal:         General: Normal range of motion.      Cervical back: Normal range of motion and neck supple.      Right lower leg: No edema.      Left lower leg: No edema.   Lymphadenopathy:      Cervical: No cervical adenopathy.   Skin:     General: Skin is warm and dry.      Capillary Refill: Capillary refill takes less than 2 seconds.      Findings: No rash.   Neurological:      General: No focal deficit present.      Mental Status: He is alert and oriented to person, place, and time.   Psychiatric:         Mood and Affect: Mood normal.         Behavior:  Behavior normal.         /68 (BP Location: Right arm, Patient Position: Sitting, Cuff Size: large)   Pulse 82   Ht 5' 9\" (1.753 m)   Wt 170 lb 3.2 oz (77.2 kg)   SpO2 97%   BMI 25.13 kg/m²  Estimated body mass index is 25.13 kg/m² as calculated from the following:    Height as of this encounter: 5' 9\" (1.753 m).    Weight as of this encounter: 170 lb 3.2 oz (77.2 kg).    Medicare Hearing Assessment:   Hearing Screening    Time taken: 2/18/2025 10:19 AM  Screening Method: Finger Rub  Finger Rub Result: Pass         Visual Acuity:   Right Eye Visual Acuity: Corrected Right Eye Chart Acuity: 20/30   Left Eye Visual Acuity: Corrected Left Eye Chart Acuity: 20/30   Both Eyes Visual Acuity: Corrected Both Eyes Chart Acuity: 20/20   Able To Tolerate Visual Acuity: Yes        Assessment & Plan:   Dean Daily is a 74 year old male who presents for a Medicare Assessment.     1. Encounter for annual wellness exam in Medicare patient (Primary)  -     Uric Acid  -     Hemoglobin A1C  -     Comp Metabolic Panel (14)  -     CBC With Differential With Platelet  -     Lipid Panel  2. Unspecified inflammatory spondylopathy, lumbar region  Assessment & Plan:  Noted 03/23  Multimodal pain approach  No issues  CPM  Orders:  -     Comp Metabolic Panel (14)  -     CBC With Differential With Platelet  3. Essential hypertension  Assessment & Plan:  Blood pressure in office today  Encouraged healthy diet and exercise  Stable on amlodipine 10 benazepril 40 daily  Check CPM  CPM  Orders:  -     Comp Metabolic Panel (14)  -     CBC With Differential With Platelet  -     Lipid Panel  4. Prediabetes  Assessment & Plan:  Lab Results   Component Value Date    A1C 5.6 02/26/2024    A1C 5.6 03/13/2023    A1C 5.4 02/25/2022    A1C 5.7 (H) 04/12/2021    A1C 5.7 (H) 06/23/2020      Encouraged continued lifestyle management  Check A1c with wellness labs   Orders:  -     Hemoglobin A1C  -     Lipid Panel  5. Spinal stenosis of lumbar region,  unspecified whether neurogenic claudication present  6. History of gout  Assessment & Plan:  Hx of gout not on meds  Has been managed with diet  Check uric acid with wellness labs  Orders:  -     Uric Acid  7. History of basal cell carcinoma (BCC)  Assessment & Plan:  Diagnosed 2008  Resolved  No follow up with derm  8. Sensorineural hearing loss, bilateral  Assessment & Plan:  Stable CPM  9. Tinnitus of both ears  Assessment & Plan:  Stable CPM  10. Age-related nuclear cataract of both eyes  Assessment & Plan:  Noticed on routine exam 09/23, no intervention needed at this time.  Advised follow up in one year, encouraged to schedule.  Nakul Alexis  Referral provided to can  Orders:  -     Ophthalmology Referral - In Network  11. Vitreous floaters of both eyes  Assessment & Plan:  Evaled in 09/23, no retinal pathology  Advised follow up in one year, encouraged to schedule.  Orders:  -     Ophthalmology Referral - In Network  12. Wears glasses  Assessment & Plan:  Advised regular check ups with eye doctor  Due for appt  13. Sleep apnea, unspecified type  Assessment & Plan:  Stable condition, continue present management.  Uses cpap every night  Orders:  -     Lipid Panel    The patient indicates understanding of these issues and agrees to the plan.  Lab work ordered.  Reinforced healthy diet, lifestyle, and exercise.      Return in about 6 months (around 8/18/2025).     Kevin Clarke, APRN, 2/17/2025     Supplementary Documentation:   General Health:  In the past six months, have you lost more than 10 pounds without trying?: 2 - No  Has your appetite been poor?: No  Type of Diet: Low Carb  How does the patient maintain a good energy level?: Appropriate Exercise  How would you describe your daily physical activity?: Moderate  How would you describe your current health state?: Good  How do you maintain positive mental well-being?: Games  On a scale of 0 to 10, with 0 being no pain and 10 being severe pain, what is  your pain level?: 0 - (None)  In the past six months, have you experienced urine leakage?: 0-No  At any time do you feel concerned for the safety/well-being of yourself and/or your children, in your home or elsewhere?: No  Have you had any immunizations at another office such as Influenza, Hepatitis B, Tetanus, or Pneumococcal?: Yes    Health Maintenance   Topic Date Due    COVID-19 Vaccine (4 - 2024-25 season) 09/01/2024    Influenza Vaccine (1) 10/01/2024    Annual Well Visit  01/01/2025    PSA  01/03/2027    Colorectal Cancer Screening  08/22/2029    Annual Depression Screening  Completed    Fall Risk Screening (Annual)  Completed    Pneumococcal Vaccine: 50+ Years  Completed    Zoster Vaccines  Completed    Meningococcal B Vaccine  Aged Out

## 2025-02-17 NOTE — ASSESSMENT & PLAN NOTE
Lab Results   Component Value Date    A1C 5.6 02/26/2024    A1C 5.6 03/13/2023    A1C 5.4 02/25/2022    A1C 5.7 (H) 04/12/2021    A1C 5.7 (H) 06/23/2020      Encouraged continued lifestyle management  Check A1c with wellness labs

## 2025-02-18 ENCOUNTER — OFFICE VISIT (OUTPATIENT)
Dept: FAMILY MEDICINE CLINIC | Facility: CLINIC | Age: 75
End: 2025-02-18

## 2025-02-18 ENCOUNTER — LAB ENCOUNTER (OUTPATIENT)
Dept: LAB | Age: 75
End: 2025-02-18
Payer: MEDICARE

## 2025-02-18 VITALS
WEIGHT: 170.19 LBS | HEART RATE: 82 BPM | OXYGEN SATURATION: 97 % | SYSTOLIC BLOOD PRESSURE: 128 MMHG | DIASTOLIC BLOOD PRESSURE: 68 MMHG | HEIGHT: 69 IN | BODY MASS INDEX: 25.21 KG/M2

## 2025-02-18 DIAGNOSIS — H90.3 SENSORINEURAL HEARING LOSS, BILATERAL: ICD-10-CM

## 2025-02-18 DIAGNOSIS — Z85.828 HISTORY OF BASAL CELL CARCINOMA (BCC): ICD-10-CM

## 2025-02-18 DIAGNOSIS — Z00.00 ENCOUNTER FOR ANNUAL WELLNESS EXAM IN MEDICARE PATIENT: Primary | ICD-10-CM

## 2025-02-18 DIAGNOSIS — I10 ESSENTIAL HYPERTENSION: Chronic | ICD-10-CM

## 2025-02-18 DIAGNOSIS — H93.13 TINNITUS OF BOTH EARS: ICD-10-CM

## 2025-02-18 DIAGNOSIS — R73.03 PREDIABETES: ICD-10-CM

## 2025-02-18 DIAGNOSIS — M48.061 SPINAL STENOSIS OF LUMBAR REGION, UNSPECIFIED WHETHER NEUROGENIC CLAUDICATION PRESENT: ICD-10-CM

## 2025-02-18 DIAGNOSIS — G47.30 SLEEP APNEA, UNSPECIFIED TYPE: ICD-10-CM

## 2025-02-18 DIAGNOSIS — Z97.3 WEARS GLASSES: ICD-10-CM

## 2025-02-18 DIAGNOSIS — M46.96 UNSPECIFIED INFLAMMATORY SPONDYLOPATHY, LUMBAR REGION: ICD-10-CM

## 2025-02-18 DIAGNOSIS — H25.13 AGE-RELATED NUCLEAR CATARACT OF BOTH EYES: ICD-10-CM

## 2025-02-18 DIAGNOSIS — H43.393 VITREOUS FLOATERS OF BOTH EYES: ICD-10-CM

## 2025-02-18 DIAGNOSIS — Z87.39 HISTORY OF GOUT: ICD-10-CM

## 2025-02-18 LAB
ALBUMIN SERPL-MCNC: 4.9 G/DL (ref 3.2–4.8)
ALBUMIN/GLOB SERPL: 1.6 {RATIO} (ref 1–2)
ALP LIVER SERPL-CCNC: 52 U/L
ALT SERPL-CCNC: 35 U/L
ANION GAP SERPL CALC-SCNC: 9 MMOL/L (ref 0–18)
AST SERPL-CCNC: 36 U/L (ref ?–34)
BASOPHILS # BLD AUTO: 0.06 X10(3) UL (ref 0–0.2)
BASOPHILS NFR BLD AUTO: 0.8 %
BILIRUB SERPL-MCNC: 0.6 MG/DL (ref 0.2–1.1)
BUN BLD-MCNC: 17 MG/DL (ref 9–23)
BUN/CREAT SERPL: 16.7 (ref 10–20)
CALCIUM BLD-MCNC: 9.5 MG/DL (ref 8.7–10.4)
CHLORIDE SERPL-SCNC: 100 MMOL/L (ref 98–112)
CHOLEST SERPL-MCNC: 178 MG/DL (ref ?–200)
CO2 SERPL-SCNC: 29 MMOL/L (ref 21–32)
CREAT BLD-MCNC: 1.02 MG/DL
DEPRECATED RDW RBC AUTO: 43 FL (ref 35.1–46.3)
EGFRCR SERPLBLD CKD-EPI 2021: 77 ML/MIN/1.73M2 (ref 60–?)
EOSINOPHIL # BLD AUTO: 0.17 X10(3) UL (ref 0–0.7)
EOSINOPHIL NFR BLD AUTO: 2.3 %
ERYTHROCYTE [DISTWIDTH] IN BLOOD BY AUTOMATED COUNT: 12.9 % (ref 11–15)
EST. AVERAGE GLUCOSE BLD GHB EST-MCNC: 114 MG/DL (ref 68–126)
FASTING PATIENT LIPID ANSWER: YES
FASTING STATUS PATIENT QL REPORTED: YES
GLOBULIN PLAS-MCNC: 3 G/DL (ref 2–3.5)
GLUCOSE BLD-MCNC: 94 MG/DL (ref 70–99)
HBA1C MFR BLD: 5.6 % (ref ?–5.7)
HCT VFR BLD AUTO: 41.1 %
HDLC SERPL-MCNC: 55 MG/DL (ref 40–59)
HGB BLD-MCNC: 14.2 G/DL
IMM GRANULOCYTES # BLD AUTO: 0.04 X10(3) UL (ref 0–1)
IMM GRANULOCYTES NFR BLD: 0.5 %
LDLC SERPL CALC-MCNC: 101 MG/DL (ref ?–100)
LYMPHOCYTES # BLD AUTO: 2.64 X10(3) UL (ref 1–4)
LYMPHOCYTES NFR BLD AUTO: 35.9 %
MCH RBC QN AUTO: 31.3 PG (ref 26–34)
MCHC RBC AUTO-ENTMCNC: 34.5 G/DL (ref 31–37)
MCV RBC AUTO: 90.7 FL
MONOCYTES # BLD AUTO: 0.53 X10(3) UL (ref 0.1–1)
MONOCYTES NFR BLD AUTO: 7.2 %
NEUTROPHILS # BLD AUTO: 3.92 X10 (3) UL (ref 1.5–7.7)
NEUTROPHILS # BLD AUTO: 3.92 X10(3) UL (ref 1.5–7.7)
NEUTROPHILS NFR BLD AUTO: 53.3 %
NONHDLC SERPL-MCNC: 123 MG/DL (ref ?–130)
OSMOLALITY SERPL CALC.SUM OF ELEC: 287 MOSM/KG (ref 275–295)
PLATELET # BLD AUTO: 273 10(3)UL (ref 150–450)
POTASSIUM SERPL-SCNC: 4.7 MMOL/L (ref 3.5–5.1)
PROT SERPL-MCNC: 7.9 G/DL (ref 5.7–8.2)
RBC # BLD AUTO: 4.53 X10(6)UL
SODIUM SERPL-SCNC: 138 MMOL/L (ref 136–145)
TRIGL SERPL-MCNC: 123 MG/DL (ref 30–149)
URATE SERPL-MCNC: 7.1 MG/DL
VLDLC SERPL CALC-MCNC: 20 MG/DL (ref 0–30)
WBC # BLD AUTO: 7.4 X10(3) UL (ref 4–11)

## 2025-02-18 PROCEDURE — 83036 HEMOGLOBIN GLYCOSYLATED A1C: CPT

## 2025-02-18 PROCEDURE — 99499 UNLISTED E&M SERVICE: CPT

## 2025-02-18 PROCEDURE — 84550 ASSAY OF BLOOD/URIC ACID: CPT

## 2025-02-18 PROCEDURE — 3078F DIAST BP <80 MM HG: CPT

## 2025-02-18 PROCEDURE — 36415 COLL VENOUS BLD VENIPUNCTURE: CPT

## 2025-02-18 PROCEDURE — 1126F AMNT PAIN NOTED NONE PRSNT: CPT

## 2025-02-18 PROCEDURE — 1170F FXNL STATUS ASSESSED: CPT

## 2025-02-18 PROCEDURE — 1160F RVW MEDS BY RX/DR IN RCRD: CPT

## 2025-02-18 PROCEDURE — 85025 COMPLETE CBC W/AUTO DIFF WBC: CPT

## 2025-02-18 PROCEDURE — 80053 COMPREHEN METABOLIC PANEL: CPT

## 2025-02-18 PROCEDURE — 3074F SYST BP LT 130 MM HG: CPT

## 2025-02-18 PROCEDURE — 3008F BODY MASS INDEX DOCD: CPT

## 2025-02-18 PROCEDURE — 80061 LIPID PANEL: CPT

## 2025-02-18 PROCEDURE — 99397 PER PM REEVAL EST PAT 65+ YR: CPT

## 2025-02-18 PROCEDURE — 1159F MED LIST DOCD IN RCRD: CPT

## 2025-02-18 NOTE — ASSESSMENT & PLAN NOTE
Noticed on routine exam 09/23, no intervention needed at this time.  Advised follow up in one year, encouraged to schedule.  Saw Vanesa  Referral provided to can

## 2025-02-20 NOTE — PROGRESS NOTES
1st Attempt:  2/20 L/vm to call back also advised to check mychart for results    Marin hemograma completo resultó normal, sin problemas de anemia o infección.  Marin panel metabólico volvió a la normalidad, sin problemas con el hígado o los riñones.  Tu prueba de tiroides resultó normal.  Marin A1c o nivel de azúcar a devendra plazo para la diabetes volvió a la normalidad.  Marin colesterol se controla everardo con medicamentos para el colesterol, no es necesario realizar ningún cambio.    ( Your complete blood count came back normal, metabolic panel came back normal, thyroid test came back normal.  Your A1c or long-range sugar for diabetes came back normal.  Your cholesterol is well managed on cholesterol medication, no need for any changes.)

## 2025-03-26 ENCOUNTER — TELEPHONE (OUTPATIENT)
Dept: SURGERY | Facility: CLINIC | Age: 75
End: 2025-03-26

## 2025-03-31 DIAGNOSIS — Z00.00 ENCOUNTER FOR ANNUAL HEALTH EXAMINATION: ICD-10-CM

## 2025-03-31 DIAGNOSIS — M48.061 SPINAL STENOSIS OF LUMBAR REGION, UNSPECIFIED WHETHER NEUROGENIC CLAUDICATION PRESENT: ICD-10-CM

## 2025-03-31 NOTE — PROGRESS NOTES
[facility]  Follow Up Visit    HPI:   Dean Daily is a 74 year old male here today for follow-up. The patient was last seen on 2025.     Patient states he is doing very well. Has not been having to get up at night to urinate. At his previous visit, he was started on Tamsulosin 0.4 mg at bedtime and Tadalafil 10-20 mg every day PRN. He states it did not work, was not able to achieve an erection.    Previous IPSS 20 (3/2/3/4/0/4/4) with QoL Score of 4. Bladder scan 133 mL, voiding approximately 30 minutes ago.      PSA history:      PSA Screen   Latest Ref Rng <=4.00 ng/mL   2012 1.7   10/26/2013 1.9   10/25/2014 1.7   2015 2.1   2016 2.3   2018 2.6   2020 2.54    3/13/2023 2.91    2025 3.3         Past Medical History:    Basal cell carcinoma    Right Lateral Leg    Blepharitis    Bilateral    Cataract     Bilateral    Essential hypertension    Herpes zoster without complication    High blood pressure    High cholesterol    Hyperlipidemia    Impacted cerumen of left ear    Meibomian gland dysfunction    Bilateral    Presbyopia    Bilateral    Pyogenic granuloma    Per next gen \"RLL\"    Vitreous floaters     Past Surgical History:   Procedure Laterality Date    Colonoscopy N/A 2017    Procedure: COLONOSCOPY;  Surgeon: Kevin Tompkins MD;  Location: [facility] ENDOSCOPY    Colonoscopy N/A 2022    Procedure: COLONOSCOPY;  Surgeon: Kevin Tompkins MD;  Location: [facility]    Ir epidural steriod injection      epidural steroid injection to back x 1    Other surgical history      Arthrocentesis of the right hip trochanteric bursa    Skin surgery  2007    Leg, Skin cancer, Surgically removed- no othe treatment      Family History   Problem Relation Age of Onset    Hypertension Mother     Thyroid disease Mother     Heart Disease Mother 95            Cancer Sister         uterine cancer.    Other (Other) Father         good health    Diabetes Neg      Glaucoma Neg     Macular degeneration Neg      Social History     Socioeconomic History    Marital status:    Tobacco Use    Smoking status: Never     Passive exposure: Never    Smokeless tobacco: Never   Vaping Use    Vaping status: Never Used   Substance and Sexual Activity    Alcohol use: Not Currently     Comment: rarely    Drug use: No   Other Topics Concern    Caffeine Concern Yes     Comment: Daily; one cup, coffee    Reaction to local anesthetic No    Pt has a pacemaker No    Pt has a defibrillator No     Current Outpatient Medications   Medication Sig Dispense Refill    tamsulosin 0.4 MG Oral Cap Take 1 capsule (0.4 mg total) by mouth daily. Take 1/2 hour following the same meal each day 90 capsule 3    Tadalafil 20 MG Oral Tab Take 0.5-1 tablets (10-20 mg total) by mouth daily as needed for Erectile Dysfunction. 10 tablet 3    IBUPROFEN 800 MG Oral Tab TAKE 1 TABLET(800 MG) BY MOUTH EVERY 8 HOURS AS NEEDED FOR PAIN 90 tablet 1    clotrimazole-betamethasone 1-0.05 % External Cream Apply 1 Application topically 2 (two) times daily as needed. 60 g 0    amLODIPine Besy-Benazepril HCl 10-40 MG Oral Cap Take 1 capsule by mouth daily. 90 capsule 3    simvastatin 20 MG Oral Tab Take 1 tablet (20 mg total) by mouth nightly. 90 tablet 3    levocetirizine 5 MG Oral Tab Take 1 tablet (5 mg total) by mouth every evening. (Patient not taking: Reported on 2/18/2025) 30 tablet 0    hydrocortisone 2.5 % External Cream Apply 1 Application topically 2 (two) times daily. 28 g 1    Multiple Vitamins-Minerals (CENTRUM VITAMINTS OR) Take 1 tablet by mouth daily.         Allergies: Patient has no known allergies.    REVIEW OF SYSTEMS:  Review of Systems   All other systems reviewed and are negative.        EXAM:  There were no vitals taken for this visit.    Physical Exam  Constitutional:       Appearance: Normal appearance.   HENT:      Head: Normocephalic and atraumatic.      Mouth/Throat:      Mouth: Mucous membranes  are moist.   Pulmonary:      Effort: Pulmonary effort is normal.   Abdominal:      General: Abdomen is flat.      Palpations: Abdomen is soft.   Skin:     General: Skin is warm and dry.   Neurological:      Mental Status: He is alert and oriented to person, place, and time.   Psychiatric:         Mood and Affect: Mood normal.         Thought Content: Thought content normal.          LABS:  Lab Results   Component Value Date    PSA 3.3 01/03/2025    TOTPSASCREEN 2.1 11/07/2015     Lab Results   Component Value Date    WBC 7.4 02/18/2025    RBC 4.53 02/18/2025    HGB 14.2 02/18/2025    HCT 41.1 02/18/2025    MCV 90.7 02/18/2025    MCH 31.3 02/18/2025    MCHC 34.5 02/18/2025    RDW 12.9 02/18/2025    .0 02/18/2025    MPV 8.2 06/09/2018     Lab Results   Component Value Date    GLU 94 02/18/2025    BUN 17 02/18/2025    BUNCREA 16.7 02/18/2025    CREATSERUM 1.02 02/18/2025    ANIONGAP 9 02/18/2025    GFRNAA 79 02/25/2022    GFRAA 92 02/25/2022    CA 9.5 02/18/2025     02/18/2025    K 4.7 02/18/2025     02/18/2025    CO2 29.0 02/18/2025     No results found for: \"PTP\", \"PT\", \"INR\"    IMAGING:  No results found.    IMPRESSION:  BPH with LUTS   Erectile Dysfunction    I discussed the variety of different treatment options for erectile dysfunction including behavioral management with weight loss, diet, exercise, meditation, open lines of communication with his partner.  We discussed phosphodiesterase inhibitors including Viagra and Cialis.  I mentioned that these medication should be taken 1 to 2 hours prior to sexual activity.  Viagra should be taken in the absence of fatty foods.  The side effects of these medications are rhinorrhea, facial flushing, GI upset, with Cialis muscle fatigues/aches, and very rarely priapism.  I also discussed Muse therapy (intraurethral alprostadil) which has the side effect of being inconsistent and having urethral discomfort.  Intracavernosal injection was described as an  option for erectile dysfunction that is not responsive to medications.  I mention that he would require teaching and dose adjustment.  The biggest risk associated with intracavernosal injection is priapism.  I also discussed vacuum erection device and very briefly as a last resort penile prosthesis either a malleable or an inflatable device. I mentioned that penile prostheses is a last resort surgical treatment and should only be pursued when all other options have been exhausted.    PLAN:  - Continue Tamsulosin 0.4 mg at bedtime  - Order test dose of intraurethral alprostadil gel. Schedule patient for teaching when dose arrives  - Bladder behaviors  - Follow-up in 6 months     Lesley Hoffmann, CLOVIS  04/03/2025

## 2025-04-02 RX ORDER — IBUPROFEN 800 MG/1
800 TABLET, FILM COATED ORAL EVERY 8 HOURS PRN
Qty: 90 TABLET | Refills: 1 | Status: SHIPPED | OUTPATIENT
Start: 2025-04-02

## 2025-04-02 NOTE — TELEPHONE ENCOUNTER
Refill passed per [facility] protocol.    Requested Prescriptions   Pending Prescriptions Disp Refills    IBUPROFEN 800 MG Oral Tab [Pharmacy Med Name: IBUPROFEN 800MG TABLETS] 90 tablet 1     Sig: TAKE 1 TABLET(800 MG) BY MOUTH EVERY 8 HOURS AS NEEDED FOR PAIN       Non-Narcotic Pain Medication Protocol Passed - 4/2/2025 12:04 PM        Passed - In person appointment or virtual visit in the past 6 mos or appointment in next 3 mos     Recent Outpatient Visits              1 month ago Encounter for annual wellness exam in Medicare patient    [facility]    [facility] Visit    2 months ago Benign prostatic hyperplasia with lower urinary tract symptoms, symptom details unspecified    [facility]    [facility]    2 [facility]    [facility]    6 months ago Seborrheic keratosis    [facility], Fabiola Gomez PA-C    Office Visit    7 months ago Change in mole    [facility], Cristal Schneider MD    Office Visit          Future Appointments         Provider Department Appt Notes    Tomorrow [facility] Hoff[facility] my [facility]  to  get  ref  3/26/25 10 WEEK                    Passed - Medication is active on med list           Future Appointments         Provider Department Appt Notes    Tomorrow [facility]  to  get  ref  3/26/25 10 WEEK          Recent Outpatient Visits              1 month ago Encounter for annual wellness exam in Medicare patient    [facility]    Office Visit    2 months ago Benign prostatic  hyperplasia with lower urinary tract symptoms, symptom details unspecified    [facility], [facility], [place] Lesley Hoffmann APRN    Office Visit    2 months ago Urinary frequency    [facility], [facility], Kevin Ayoub APRN    Office Visit    6 months ago Seborrheic keratosis    [facility], [facility], [place] Fabiola Wu PA-C    Office Visit    7 months ago Change in mole    [facility], [facility], Cristal Schneidre MD    Office Visit

## 2025-04-03 ENCOUNTER — TELEPHONE (OUTPATIENT)
Dept: SURGERY | Facility: CLINIC | Age: 75
End: 2025-04-03

## 2025-04-03 ENCOUNTER — TELEPHONE (OUTPATIENT)
Dept: CASE MANAGEMENT | Age: 75
End: 2025-04-03

## 2025-04-03 ENCOUNTER — OFFICE VISIT (OUTPATIENT)
Dept: SURGERY | Facility: CLINIC | Age: 75
End: 2025-04-03

## 2025-04-03 DIAGNOSIS — N40.1 BENIGN PROSTATIC HYPERPLASIA WITH LOWER URINARY TRACT SYMPTOMS, SYMPTOM DETAILS UNSPECIFIED: Primary | ICD-10-CM

## 2025-04-03 DIAGNOSIS — R35.0 URINARY FREQUENCY: Primary | ICD-10-CM

## 2025-04-03 DIAGNOSIS — N52.9 ERECTILE DYSFUNCTION, UNSPECIFIED ERECTILE DYSFUNCTION TYPE: ICD-10-CM

## 2025-04-03 PROCEDURE — 1160F RVW MEDS BY RX/DR IN RCRD: CPT

## 2025-04-03 PROCEDURE — 1159F MED LIST DOCD IN RCRD: CPT

## 2025-04-03 PROCEDURE — 99214 OFFICE O/P EST MOD 30 MIN: CPT

## 2025-04-03 RX ORDER — TAMSULOSIN HYDROCHLORIDE 0.4 MG/1
0.4 CAPSULE ORAL DAILY
Qty: 90 CAPSULE | Refills: 3 | Status: SHIPPED | OUTPATIENT
Start: 2025-04-03 | End: 2026-03-29

## 2025-04-03 NOTE — TELEPHONE ENCOUNTER
Patient seen if office today, order for urethral gel filled out. Faxed to [facility]. Will send copy to scanning.

## 2025-04-03 NOTE — TELEPHONE ENCOUNTER
Dr Mckeon,     Patient called requesting referral to Lesley Hoffmann for appointment today 4/3/25.    Pended referral please review diagnosis and sign off if you agree.    Thank you.  Mily Jean  [facility]

## 2025-04-09 ENCOUNTER — TELEPHONE (OUTPATIENT)
Dept: SURGERY | Facility: CLINIC | Age: 75
End: 2025-04-09

## 2025-04-09 DIAGNOSIS — N52.9 ERECTILE DYSFUNCTION, UNSPECIFIED ERECTILE DYSFUNCTION TYPE: ICD-10-CM

## 2025-04-09 DIAGNOSIS — N13.8 BPH WITH OBSTRUCTION/LOWER URINARY TRACT SYMPTOMS: Primary | ICD-10-CM

## 2025-04-09 DIAGNOSIS — N40.1 BPH WITH OBSTRUCTION/LOWER URINARY TRACT SYMPTOMS: Primary | ICD-10-CM

## 2025-04-09 NOTE — PROGRESS NOTES
[facility]  Follow Up Visit    HPI:   Dean Daily is a 74 year old male here today for follow-up. The patient was last seen on 2025.     Patient states he is doing very well. Has not been having to get up at night to urinate. At a previous visit, he was started on Tamsulosin 0.4 mg at bedtime and Tadalafil 10-20 mg every day PRN. He states it did not work, was not able to achieve an erection.    The patient has opted to try intraurethral gel for treatment of ED and is presenting today for education on usage. Indications, usage, and possible side effects were discussed.       PSA history:      PSA Screen   Latest Ref Rng <=4.00 ng/mL   2012 1.7   10/26/2013 1.9   10/25/2014 1.7   2015 2.1   2016 2.3   2018 2.6   2020 2.54    3/13/2023 2.91    2025 3.3       Past Medical History:    Basal cell carcinoma    Right Lateral Leg    Blepharitis    Bilateral    Cataract     Bilateral    Essential hypertension    Herpes zoster without complication    High blood pressure    High cholesterol    Hyperlipidemia    Impacted cerumen of left ear    Meibomian gland dysfunction    Bilateral    Presbyopia    Bilateral    Pyogenic granuloma    Per next gen \"RLL\"    Vitreous floaters     Past Surgical History:   Procedure Laterality Date    Colonoscopy N/A 2017    Procedure: COLONOSCOPY;  Surgeon: Kevin Tompkins MD;  Location: [facility] ENDOSCOPY    Colonoscopy N/A 2022    Procedure: COLONOSCOPY;  Surgeon: Kevin Tompkins MD;  Location: [facility]    Ir epidural steriod injection      epidural steroid injection to back x 1    Other surgical history      Arthrocentesis of the right hip trochanteric bursa    Skin surgery  2007    Leg, Skin cancer, Surgically removed- no othe treatment      Family History   Problem Relation Age of Onset    Hypertension Mother     Thyroid disease Mother     Heart Disease Mother 95            Cancer Sister         uterine cancer.     Other (Other) Father         good health    Diabetes Neg     Glaucoma Neg     Macular degeneration Neg      Social History     Socioeconomic History    Marital status:    Tobacco Use    Smoking status: Never     Passive exposure: Never    Smokeless tobacco: Never   Vaping Use    Vaping status: Never Used   Substance and Sexual Activity    Alcohol use: Not Currently     Comment: rarely    Drug use: No   Other Topics Concern    Caffeine Concern Yes     Comment: Daily; one cup, coffee    Reaction to local anesthetic No    Pt has a pacemaker No    Pt has a defibrillator No     Current Outpatient Medications   Medication Sig Dispense Refill    tamsulosin 0.4 MG Oral Cap Take 1 capsule (0.4 mg total) by mouth daily. Take 1/2 hour following the same meal each day 90 capsule 3    ibuprofen 800 MG Oral Tab Take 1 tablet (800 mg total) by mouth every 8 (eight) hours as needed for Pain. 90 tablet 1    Tadalafil 20 MG Oral Tab Take 0.5-1 tablets (10-20 mg total) by mouth daily as needed for Erectile Dysfunction. 10 tablet 3    clotrimazole-betamethasone 1-0.05 % External Cream Apply 1 Application topically 2 (two) times daily as needed. 60 g 0    amLODIPine Besy-Benazepril HCl 10-40 MG Oral Cap Take 1 capsule by mouth daily. 90 capsule 3    simvastatin 20 MG Oral Tab Take 1 tablet (20 mg total) by mouth nightly. 90 tablet 3    levocetirizine 5 MG Oral Tab Take 1 tablet (5 mg total) by mouth every evening. (Patient not taking: Reported on 1/3/2025) 30 tablet 0    hydrocortisone 2.5 % External Cream Apply 1 Application topically 2 (two) times daily. 28 g 1    Multiple Vitamins-Minerals (CENTRUM VITAMINTS OR) Take 1 tablet by mouth daily.         Allergies: Patient has no known allergies.    REVIEW OF SYSTEMS:  Review of Systems   All other systems reviewed and are negative.        EXAM:  There were no vitals taken for this visit.    Physical Exam  Constitutional:       Appearance: Normal appearance.   HENT:      Head:  Normocephalic and atraumatic.      Mouth/Throat:      Mouth: Mucous membranes are moist.   Pulmonary:      Effort: Pulmonary effort is normal.   Abdominal:      General: Abdomen is flat.      Palpations: Abdomen is soft.   Skin:     General: Skin is warm and dry.   Neurological:      Mental Status: He is alert and oriented to person, place, and time.   Psychiatric:         Mood and Affect: Mood normal.         Thought Content: Thought content normal.          LABS:  Lab Results   Component Value Date    PSA 3.3 01/03/2025    TOTPSASCREEN 2.1 11/07/2015     Lab Results   Component Value Date    WBC 7.4 02/18/2025    RBC 4.53 02/18/2025    HGB 14.2 02/18/2025    HCT 41.1 02/18/2025    MCV 90.7 02/18/2025    MCH 31.3 02/18/2025    MCHC 34.5 02/18/2025    RDW 12.9 02/18/2025    .0 02/18/2025    MPV 8.2 06/09/2018     Lab Results   Component Value Date    GLU 94 02/18/2025    BUN 17 02/18/2025    BUNCREA 16.7 02/18/2025    CREATSERUM 1.02 02/18/2025    ANIONGAP 9 02/18/2025    GFRNAA 79 02/25/2022    GFRAA 92 02/25/2022    CA 9.5 02/18/2025     02/18/2025    K 4.7 02/18/2025     02/18/2025    CO2 29.0 02/18/2025     No results found for: \"PTP\", \"PT\", \"INR\"    IMAGING:  No results found.    IMPRESSION:  BPH with LUTS   Erectile Dysfunction    Trial of intraurethral gel for ED.  [facility]  Patient was instructed today on usage of the medication, including insertion into the meatus and to rub the penis to ensure proper absorption. Discussed beginning with low dose of 0.1mL, and may increase to 0.2mL tomorrow if lower dose does not provide satisfactory results. Discussed possible side effects, including penile pain/burning. Patient inserted 0.1mL into the tip of the penis, held a finger over the meatus to prevent leakage, and massaged the gel down the length of the urethra. After 15 minutes, the patient had 70% efficacy.    -Inject 0.1-0.2mL of gel into  the tip of the penis as needed for intercourse  -If erection persists more than two hours, take a dose of Sudafed  -If erection persists more than 4 hours, go to the emergency room      PLAN:  - Continue Tamsulosin 0.4 mg at bedtime  - Prescription for intraurethral alprostadil gel sent to pharmacy  - Bladder behaviors  - Follow-up in 6-8 weeks     CLOVIS Head  04/10/2025

## 2025-04-09 NOTE — TELEPHONE ENCOUNTER
Received Urethral gel from [facility]. Placed in fridge. I spoke with patient, and assisted in scheduling patient for teaching with Skip.     Patient states he will also need referral for visit, will send patient PCP's office.

## 2025-04-09 NOTE — TELEPHONE ENCOUNTER
Patient has appointment scheduled tomorrow with ZEE Head.     Referral pended, please advise.     Future Appointments   Date Time Provider Department Center   4/10/2025 10:00 AM Lesley Hoffmann APRN [facility]

## 2025-04-10 ENCOUNTER — TELEPHONE (OUTPATIENT)
Dept: SURGERY | Facility: CLINIC | Age: 75
End: 2025-04-10

## 2025-04-10 ENCOUNTER — OFFICE VISIT (OUTPATIENT)
Dept: SURGERY | Facility: CLINIC | Age: 75
End: 2025-04-10

## 2025-04-10 DIAGNOSIS — N52.9 ERECTILE DYSFUNCTION, UNSPECIFIED ERECTILE DYSFUNCTION TYPE: Primary | ICD-10-CM

## 2025-04-10 PROCEDURE — 1159F MED LIST DOCD IN RCRD: CPT

## 2025-04-10 PROCEDURE — 99212 OFFICE O/P EST SF 10 MIN: CPT

## 2025-04-10 PROCEDURE — 1160F RVW MEDS BY RX/DR IN RCRD: CPT

## 2025-04-10 NOTE — PATIENT INSTRUCTIONS
-Inject 0.1-0.2mL of gel into the tip of the penis as needed for intercourse   insertion into the meatus and to rub the penis to ensure proper absorption  -If erection persists more than two hours, take a dose of Sudafed  -If erection persists more than 4 hours, go to the emergency room    Follow-up in 6 weeks

## 2025-04-10 NOTE — TELEPHONE ENCOUNTER
1. BPH with obstruction/lower urinary tract symptoms    - Urology Referral - In Network    2. Erectile dysfunction, unspecified erectile dysfunction type    - Urology Referral - In Network

## 2025-04-10 NOTE — TELEPHONE ENCOUNTER
Received filled out urology prescription for urtheral gel filled out by Alessandra ESCUDERO faxed to [facility]. Will send copy to scanning.

## 2025-04-30 ENCOUNTER — TELEPHONE (OUTPATIENT)
Dept: FAMILY MEDICINE CLINIC | Facility: CLINIC | Age: 75
End: 2025-04-30

## 2025-04-30 NOTE — TELEPHONE ENCOUNTER
Patient calling to inquire about machine or procedure that he could have that would cure his hernias. Patient asked to provide a little more information. Patient states that his daughter has all the information and he would like an appointment to discuss with the provider the possibility of completing this. Appointment scheduled.    Future Appointments   Date Time Provider Department Center   5/1/2025  9:40 AM Kevin Clarke APRN [facility]

## 2025-05-01 ENCOUNTER — OFFICE VISIT (OUTPATIENT)
Dept: FAMILY MEDICINE CLINIC | Facility: CLINIC | Age: 75
End: 2025-05-01

## 2025-05-01 VITALS
DIASTOLIC BLOOD PRESSURE: 71 MMHG | WEIGHT: 174 LBS | BODY MASS INDEX: 25.77 KG/M2 | HEART RATE: 81 BPM | HEIGHT: 69 IN | TEMPERATURE: 97 F | SYSTOLIC BLOOD PRESSURE: 136 MMHG

## 2025-05-01 DIAGNOSIS — M79.641 BILATERAL HAND PAIN: ICD-10-CM

## 2025-05-01 DIAGNOSIS — M48.061 SPINAL STENOSIS OF LUMBAR REGION, UNSPECIFIED WHETHER NEUROGENIC CLAUDICATION PRESENT: Primary | ICD-10-CM

## 2025-05-01 DIAGNOSIS — M79.642 BILATERAL HAND PAIN: ICD-10-CM

## 2025-05-01 PROCEDURE — 3008F BODY MASS INDEX DOCD: CPT

## 2025-05-01 PROCEDURE — 99214 OFFICE O/P EST MOD 30 MIN: CPT

## 2025-05-01 PROCEDURE — 1159F MED LIST DOCD IN RCRD: CPT

## 2025-05-01 PROCEDURE — 3075F SYST BP GE 130 - 139MM HG: CPT

## 2025-05-01 PROCEDURE — 1126F AMNT PAIN NOTED NONE PRSNT: CPT

## 2025-05-01 PROCEDURE — 1160F RVW MEDS BY RX/DR IN RCRD: CPT

## 2025-05-01 PROCEDURE — 3078F DIAST BP <80 MM HG: CPT

## 2025-05-01 NOTE — PROGRESS NOTES
Subjective:   Dean Daily is a 74 year old male who presents for Consult (Patient is coming in for consult about a procedure its a machine for his back for his hernia/)   Patient is a pleasant 74-year-old male past medical history significant for hypertension, hyperlipidemia, basal cell carcinoma, bilateral cataracts, OA, sleep apnea, and prediabetes.  Patient presents to office today for reported discussion on planned prcoedure. Review of TE reveals      \"Patient calling to inquire about machine or procedure that he could have that would cure his hernias. Patient asked to provide a little more information. Patient states that his daughter has all the information and he would like an appointment to discuss with the provider the possibility of completing this. Appointment scheduled.\"    Patient states he has hx of lower back pain and hx of herniation. He has been in therapy. He has taken medications. He has seen neurosurgery jennieo in past. He saw an ad on the tv about natural therapies for chronic back pain. Curious if this is something he could try. He is going to a clinic in [place]. [facility] core clinic. Naturalistic. Non surgical spinal decompression with an inverse table. $ 105 dollars initial. Then $85 after. He was advised that inherent risk with any procedures. However, natural therapies are ok to participate in as long as does not interfere with current treatments. Advised of cost incurrence as well and multiple visits. States understanding. Wants to try once.     He also has issue with bilateral hands. He has been noticing more stiffness and pain at end of day especially after going to the gym. Looking for recommendations on this. Advised on OA and progressive nature. Will obtain baseline imaging. Will discuss OTC supplements to slow progression. Referral to physiatry as needed for steroid injection as needed.         Past Medical History[1]   Past Surgical History[2]     History/Other:    Chief  Complaint Reviewed and Verified  Nursing Notes Reviewed and   Verified  Tobacco Reviewed  Allergies Reviewed  Medications Reviewed    Problem List Reviewed  Medical History Reviewed  Surgical History   Reviewed  Family History Reviewed  Social History Reviewed         Tobacco:  He has never smoked tobacco.    Current Medications[3]      Review of Systems:  Review of Systems   Constitutional:  Negative for chills and fever.   Respiratory:  Negative for shortness of breath.    Cardiovascular:  Negative for chest pain.   Musculoskeletal:  Positive for arthralgias, back pain and joint swelling. Negative for myalgias.         Objective:   /71 (BP Location: Right arm, Patient Position: Sitting, Cuff Size: adult)   Pulse 81   Temp 97 °F (36.1 °C)   Ht 5' 9\" (1.753 m)   Wt 174 lb (78.9 kg)   BMI 25.70 kg/m²  Estimated body mass index is 25.7 kg/m² as calculated from the following:    Height as of this encounter: 5' 9\" (1.753 m).    Weight as of this encounter: 174 lb (78.9 kg).  Physical Exam  Vitals and nursing note reviewed.   Constitutional:       Appearance: Normal appearance.   Cardiovascular:      Rate and Rhythm: Normal rate and regular rhythm.      Pulses: Normal pulses.      Heart sounds: Normal heart sounds.   Pulmonary:      Effort: Pulmonary effort is normal.      Breath sounds: Normal breath sounds.   Musculoskeletal:         General: Swelling present. No tenderness, deformity or signs of injury.      Comments: Bilateral hands with herbedern and brouchard nodules  No catching  Mild crepitus   Skin:     Capillary Refill: Capillary refill takes less than 2 seconds.   Neurological:      Mental Status: He is alert and oriented to person, place, and time.           Assessment & Plan:   1. Spinal stenosis of lumbar region, unspecified whether neurogenic claudication present (Primary)  2. Bilateral hand pain  -     XR HAND (2 VIEWS), LEFT (CPT=73120); Future; Expected date: 05/01/2025  -     XR  HAND (2 VIEWS), RIGHT (CPT=73120); Future; Expected date: 05/01/2025  1. Spinal stenosis of lumbar region, unspecified whether neurogenic claudication present  Ok for natural approach  If pain or adverse effect recommend discontinuation  Advised on cost benefit concern  Follow up as needed    2. Bilateral hand pain  Exam consistent with OA  Obtain baseline imaging  Supportive measures reviewed  Physiatry referral as needed for steroid injections.   - XR HAND (2 VIEWS), LEFT (CPT=73120); Future  - XR HAND (2 VIEWS), RIGHT (CPT=73120); Future    Patient aware of plan of care. All questions answered to satisfaction of the patient. Patient instructed to call office or reach out via [employer] if any issues arise. For urgent issues and/or reviewed red flags please proceed to the urgent care or ER.  Also, inform the nurse practitioner with any new symptoms or medication side effects.        Return for Annual physical.    CLOVIS Turner, 5/1/2025, 7:47 AM          [1]   Past Medical History:   Basal cell carcinoma    Right Lateral Leg    Blepharitis    Bilateral    Cataract     Bilateral    Essential hypertension    Herpes zoster without complication    High blood pressure    High cholesterol    Hyperlipidemia    Impacted cerumen of left ear    Meibomian gland dysfunction    Bilateral    Presbyopia    Bilateral    Pyogenic granuloma    Per next gen \"RLL\"    Vitreous floaters   [2]   Past Surgical History:  Procedure Laterality Date    Colonoscopy N/A 5/19/2017    Procedure: COLONOSCOPY;  Surgeon: Kevin Tompkins MD;  Location: [facility] ENDOSCOPY    Colonoscopy N/A 8/22/2022    Procedure: COLONOSCOPY;  Surgeon: Kevin Tompkins MD;  Location: [facility] ENDO    Ir epidural steriod injection  2009    epidural steroid injection to back x 1    Other surgical history      Arthrocentesis of the right hip trochanteric bursa    Skin surgery  2007    Leg, Skin cancer, Surgically removed- no othe treatment    [3]   Current Outpatient  Medications   Medication Sig Dispense Refill    tamsulosin 0.4 MG Oral Cap Take 1 capsule (0.4 mg total) by mouth daily. Take 1/2 hour following the same meal each day 90 capsule 3    ibuprofen 800 MG Oral Tab Take 1 tablet (800 mg total) by mouth every 8 (eight) hours as needed for Pain. 90 tablet 1    Tadalafil 20 MG Oral Tab Take 0.5-1 tablets (10-20 mg total) by mouth daily as needed for Erectile Dysfunction. 10 tablet 3    clotrimazole-betamethasone 1-0.05 % External Cream Apply 1 Application topically 2 (two) times daily as needed. 60 g 0    amLODIPine Besy-Benazepril HCl 10-40 MG Oral Cap Take 1 capsule by mouth daily. 90 capsule 3    simvastatin 20 MG Oral Tab Take 1 tablet (20 mg total) by mouth nightly. 90 tablet 3    levocetirizine 5 MG Oral Tab Take 1 tablet (5 mg total) by mouth every evening. 30 tablet 0    hydrocortisone 2.5 % External Cream Apply 1 Application topically 2 (two) times daily. 28 g 1    Multiple Vitamins-Minerals (CENTRUM VITAMINTS OR) Take 1 tablet by mouth daily.

## 2025-05-24 ENCOUNTER — HOSPITAL ENCOUNTER (OUTPATIENT)
Dept: GENERAL RADIOLOGY | Age: 75
Discharge: HOME | End: 2025-05-24
Payer: MEDICARE

## 2025-05-24 DIAGNOSIS — M79.642 BILATERAL HAND PAIN: ICD-10-CM

## 2025-05-24 DIAGNOSIS — M79.641 BILATERAL HAND PAIN: ICD-10-CM

## 2025-05-24 PROCEDURE — 73130 X-RAY EXAM OF HAND: CPT

## 2025-05-29 DIAGNOSIS — M18.0 ARTHRITIS OF CARPOMETACARPAL (CMC) JOINT OF BOTH THUMBS: Primary | ICD-10-CM

## 2025-05-29 DIAGNOSIS — I10 ESSENTIAL HYPERTENSION: ICD-10-CM

## 2025-05-29 RX ORDER — AMLODIPINE AND BENAZEPRIL HYDROCHLORIDE 10; 40 MG/1; MG/1
1 CAPSULE ORAL DAILY
Qty: 90 CAPSULE | Refills: 3 | OUTPATIENT
Start: 2025-05-29

## 2025-05-29 NOTE — PROGRESS NOTES
Full Name & , verify w/ pt  Informed of below msg, pt understood and had no further questions    Las radiografías de arcadio bilaterales muestran artritis moderada bilateral.  Recomendaría continuar con la terapia de apoyo avis se discutió en el consultorio. La artritis es idalia enfermedad progresiva. Si desea recibir inyecciones de esteroides, le he remitido a fisiatría.  Por favor programe según thomas propios deseos.     (x-rays of bilateral hands show bilateral moderate arthritis. Would recommend continued supportive therapy as discussed in office. Arthritis is a progressive disease.  If you would like to pursue steroid injections I have placed a referral for physiatry.  Please schedule at your own desire.)      Physiatry Referral:     Alex Behar MD   [address] [address]  [facility] [address]   [phone]

## 2025-05-30 DIAGNOSIS — M48.061 SPINAL STENOSIS OF LUMBAR REGION, UNSPECIFIED WHETHER NEUROGENIC CLAUDICATION PRESENT: ICD-10-CM

## 2025-05-30 DIAGNOSIS — Z00.00 ENCOUNTER FOR ANNUAL HEALTH EXAMINATION: ICD-10-CM

## 2025-05-30 RX ORDER — IBUPROFEN 800 MG/1
800 TABLET, FILM COATED ORAL EVERY 8 HOURS PRN
Qty: 90 TABLET | Refills: 1 | Status: SHIPPED | OUTPATIENT
Start: 2025-05-30

## 2025-05-30 NOTE — TELEPHONE ENCOUNTER
Refill passes per [facility] protocol.    No future appointments with primary care medicine.

## 2025-06-02 ENCOUNTER — TELEPHONE (OUTPATIENT)
Dept: FAMILY MEDICINE CLINIC | Facility: CLINIC | Age: 75
End: 2025-06-02

## 2025-06-02 DIAGNOSIS — M79.642 BILATERAL HAND PAIN: ICD-10-CM

## 2025-06-02 DIAGNOSIS — M25.539 PAIN IN WRIST, UNSPECIFIED LATERALITY: Primary | ICD-10-CM

## 2025-06-02 DIAGNOSIS — M79.641 BILATERAL HAND PAIN: ICD-10-CM

## 2025-06-02 NOTE — TELEPHONE ENCOUNTER
Patient is requesting referral.     Name of specialist and specialty department : Dr.Tibor Sloan  Reason for visit with the specialist: consult - pain in waist   Address of the specialist office [address] [place], [address]   Appointment date: n/a          PSR informed patient the turnaround time for referral is 5-7 business days.  Patient was informed to check their [employer] account for referral status.

## 2025-06-03 ENCOUNTER — TELEPHONE (OUTPATIENT)
Dept: CASE MANAGEMENT | Age: 75
End: 2025-06-03

## 2025-06-03 DIAGNOSIS — M79.641 BILATERAL HAND PAIN: Primary | ICD-10-CM

## 2025-06-03 DIAGNOSIS — M79.642 BILATERAL HAND PAIN: Primary | ICD-10-CM

## 2025-06-03 DIAGNOSIS — M25.539 PAIN IN WRIST, UNSPECIFIED LATERALITY: ICD-10-CM

## 2025-06-03 NOTE — TELEPHONE ENCOUNTER
Kevin Clarke,     Patient called requesting referral to Dr. Sloan.     Pended referral please review diagnosis and sign off if you agree.    Thank you.  Mily Jean  [facility]

## 2025-06-03 NOTE — TELEPHONE ENCOUNTER
Referral was signed for MD Sloan.  However, this provider is a neurosurgeon and will not provide steroid injections to arthritic hands.  If he would like evaluation of neck this is appropriate. Thanks

## 2025-06-04 ENCOUNTER — TELEPHONE (OUTPATIENT)
Dept: CASE MANAGEMENT | Age: 75
End: 2025-06-04

## 2025-06-04 DIAGNOSIS — M54.9 BACK PAIN, UNSPECIFIED BACK LOCATION, UNSPECIFIED BACK PAIN LATERALITY, UNSPECIFIED CHRONICITY: Primary | ICD-10-CM

## 2025-06-04 NOTE — TELEPHONE ENCOUNTER
Dr. Clarke,     Patient needs referral to [facility].    Pended referral please review diagnosis and sign off if you agree.    Thank you.  Mily Jean  [facility]

## 2025-06-05 NOTE — TELEPHONE ENCOUNTER
Patient recently had hand x-rays completed.  Does he have pain in the wrist?  If so he does not need to see neurosurgery.  Please help triage

## 2025-06-05 NOTE — PROGRESS NOTES
[facility]  Follow Up Visit    HPI:   Dean Daily is a 74 year old male here today for follow-up. The patient was last seen on 04/10/2025.     Patient states his urinary symptoms are stable. The patient was previously started on Tamsulosin 0.4 mg at bedtime with good results.    The patient also tried Tadalafil 10-20 mg every day PRN. He felt the Tadalafil did not work, was not able to achieve an erection. He also failed management with Sildenafil. Currently using intraurethral gel without good results. Would like to discuss other options. States his wife is not interested in him using a LEIDA.    PSA history:      PSA Screen   Latest Ref Rng <=4.00 ng/mL   2012 1.7   10/26/2013 1.9   10/25/2014 1.7   2015 2.1   2016 2.3   2018 2.6   2020 2.54    3/13/2023 2.91    2025 3.3       Past Medical History:    Basal cell carcinoma    Right Lateral Leg    Blepharitis    Bilateral    Cataract     Bilateral    Essential hypertension    Herpes zoster without complication    High blood pressure    High cholesterol    Hyperlipidemia    Impacted cerumen of left ear    Meibomian gland dysfunction    Bilateral    Presbyopia    Bilateral    Pyogenic granuloma    Per next gen \"RLL\"    Vitreous floaters     Past Surgical History:   Procedure Laterality Date    Colonoscopy N/A 2017    Procedure: COLONOSCOPY;  Surgeon: Kevin Tompkins MD;  Location: [facility] ENDOSCOPY    Colonoscopy N/A 2022    Procedure: COLONOSCOPY;  Surgeon: Kevin Tompkins MD;  [facility]: [facility]    Ir epidural steriod injection      epidural steroid injection to back x 1    Other surgical history      Arthrocentesis of the right hip trochanteric bursa    Skin surgery      Leg, Skin cancer, Surgically removed- no othe treatment      Family History   Problem Relation Age of Onset    Hypertension Mother     Thyroid disease Mother     Heart Disease Mother 95            Cancer Sister         uterine  cancer.    Other (Other) Father         good health    Diabetes Neg     Glaucoma Neg     Macular degeneration Neg      Social History     Socioeconomic History    Marital status:    Tobacco Use    Smoking status: Never     Passive exposure: Never    Smokeless tobacco: Never   Vaping Use    Vaping status: Never Used   Substance and Sexual Activity    Alcohol use: Not Currently     Comment: rarely    Drug use: No   Other Topics Concern    Caffeine Concern Yes     Comment: Daily; one cup, coffee    Reaction to local anesthetic No    Pt has a pacemaker No    Pt has a defibrillator No     Current Outpatient Medications   Medication Sig Dispense Refill    ibuprofen 800 MG Oral Tab Take 1 tablet (800 mg total) by mouth every 8 (eight) hours as needed for Pain. 90 tablet 1    tamsulosin 0.4 MG Oral Cap Take 1 capsule (0.4 mg total) by mouth daily. Take 1/2 hour following the same meal each day 90 capsule 3    Tadalafil 20 MG Oral Tab Take 0.5-1 tablets (10-20 mg total) by mouth daily as needed for Erectile Dysfunction. 10 tablet 3    clotrimazole-betamethasone 1-0.05 % External Cream Apply 1 Application topically 2 (two) times daily as needed. 60 g 0    amLODIPine Besy-Benazepril HCl 10-40 MG Oral Cap Take 1 capsule by mouth daily. 90 capsule 3    simvastatin 20 MG Oral Tab Take 1 tablet (20 mg total) by mouth nightly. 90 tablet 3    levocetirizine 5 MG Oral Tab Take 1 tablet (5 mg total) by mouth every evening. 30 tablet 0    hydrocortisone 2.5 % External Cream Apply 1 Application topically 2 (two) times daily. 28 g 1    Multiple Vitamins-Minerals (CENTRUM VITAMINTS OR) Take 1 tablet by mouth daily.         Allergies: Patient has no known allergies.    REVIEW OF SYSTEMS:  Review of Systems   All other systems reviewed and are negative.        EXAM:  There were no vitals taken for this visit.    Physical Exam  Constitutional:       Appearance: Normal appearance.   HENT:      Head: Normocephalic and atraumatic.       Mouth/Throat:      Mouth: Mucous membranes are moist.   Pulmonary:      Effort: Pulmonary effort is normal.   Abdominal:      General: Abdomen is flat.      Palpations: Abdomen is soft.   Skin:     General: Skin is warm and dry.   Neurological:      Mental Status: He is alert and oriented to person, place, and time.   Psychiatric:         Mood and Affect: Mood normal.         Thought Content: Thought content normal.          LABS:  Lab Results   Component Value Date    PSA 3.3 01/03/2025    TOTPSASCREEN 2.1 11/07/2015     Lab Results   Component Value Date    WBC 7.4 02/18/2025    RBC 4.53 02/18/2025    HGB 14.2 02/18/2025    HCT 41.1 02/18/2025    MCV 90.7 02/18/2025    MCH 31.3 02/18/2025    MCHC 34.5 02/18/2025    RDW 12.9 02/18/2025    .0 02/18/2025    MPV 8.2 06/09/2018     Lab Results   Component Value Date    GLU 94 02/18/2025    BUN 17 02/18/2025    BUNCREA 16.7 02/18/2025    CREATSERUM 1.02 02/18/2025    ANIONGAP 9 02/18/2025    GFRNAA 79 02/25/2022    GFRAA 92 02/25/2022    CA 9.5 02/18/2025     02/18/2025    K 4.7 02/18/2025     02/18/2025    CO2 29.0 02/18/2025     No results found for: \"PTP\", \"PT\", \"INR\"    IMAGING:  No results found.    IMPRESSION:  BPH with LUTS   Erectile Dysfunction      PLAN:  - Continue Tamsulosin 0.4 mg at bedtime  - Stop intraurethral alprostadil gel every day PRN  - Prescription for test dose of Trimix sent to pharmacy    Lesley Hoffmann, APRN  06/06/2025

## 2025-06-06 ENCOUNTER — OFFICE VISIT (OUTPATIENT)
Dept: SURGERY | Facility: CLINIC | Age: 75
End: 2025-06-06

## 2025-06-06 DIAGNOSIS — N13.8 BPH WITH OBSTRUCTION/LOWER URINARY TRACT SYMPTOMS: Primary | ICD-10-CM

## 2025-06-06 DIAGNOSIS — N52.9 ERECTILE DYSFUNCTION, UNSPECIFIED ERECTILE DYSFUNCTION TYPE: ICD-10-CM

## 2025-06-06 DIAGNOSIS — N40.1 BPH WITH OBSTRUCTION/LOWER URINARY TRACT SYMPTOMS: Primary | ICD-10-CM

## 2025-06-06 PROCEDURE — 1160F RVW MEDS BY RX/DR IN RCRD: CPT

## 2025-06-06 PROCEDURE — 99213 OFFICE O/P EST LOW 20 MIN: CPT

## 2025-06-06 PROCEDURE — 1159F MED LIST DOCD IN RCRD: CPT

## 2025-06-06 RX ORDER — TADALAFIL 20 MG/1
TABLET ORAL
COMMUNITY
Start: 2025-01-27

## 2025-06-06 NOTE — PATIENT INSTRUCTIONS
Refill policies:    Allow 2-3 business days for refills; controlled substances may take longer.  Contact your pharmacy at least 5 days prior to running out of medication and have them send an electronic request or submit request through the “request refill” option in your [employer] account.  Refills are not addressed on weekends; covering physicians do not authorize routine medications on weekends.  No narcotics or controlled substances are refilled after noon on Fridays or by on call physicians.  By law, narcotics must be electronically prescribed.  A 30 day supply with no refills is the maximum allowed.  If your prescription is due for a refill, you may be due for a follow up appointment.  To best provide you care, patients receiving routine medications need to be seen at least once a year.  Patients receiving narcotic/controlled substance medications need to be seen at least once every 3 months.  In the event that your preferred pharmacy does not have the requested medication in stock (e.g. Backordered), it is your responsibility to find another pharmacy that has the requested medication available.  We will gladly send a new prescription to that pharmacy at your request.    Scheduling Tests:    If your physician has ordered radiology tests such as MRI or CT scans, please contact Central Scheduling at [phone] right away to schedule the test.  Once scheduled, the [facility] Centralized Referral Team will work with your insurance carrier to obtain pre-certification or prior authorization.  Depending on your insurance carrier, approval may take 3-10 days.  It is highly recommended patients assure they have received an authorization before having a test performed.  If test is done without insurance authorization, patient may be responsible for the entire amount billed.      Precertification and Prior Authorizations:  If your physician has recommended that you have a procedure or additional testing performed the [facility]  Centralized Referral Team will contact your insurance carrier to obtain pre-certification or prior authorization.    You are strongly encouraged to contact your insurance carrier to verify that your procedure/test has been approved and is a COVERED benefit.  Although the [facility] Centralized Referral Team does its due diligence, the insurance carrier gives the disclaimer that \"Although the procedure is authorized, this does not guarantee payment.\"    Ultimately the patient is responsible for payment.   Thank you for your understanding in this matter.  Paperwork Completion:  If you require FMLA or disability paperwork for your recovery, please make sure to either drop it off or have it faxed to our office at [phone]. Be sure the form has your name and date of birth on it.  The form will be faxed to our Forms Department and they will complete it for you.  There is a 25$ fee for all forms that need to be filled out.  Please be aware there is a 10-14 day turnaround time.  You will need to sign a release of information (JOVITA) form if your paperwork does not come with one.  You may call the Forms Department with any questions at [phone].  Their fax number is [phone].

## 2025-06-09 ENCOUNTER — OFFICE VISIT (OUTPATIENT)
Dept: SURGERY | Facility: CLINIC | Age: 75
End: 2025-06-09
Payer: MEDICARE

## 2025-06-09 VITALS
WEIGHT: 174 LBS | HEART RATE: 77 BPM | HEIGHT: 69 IN | OXYGEN SATURATION: 96 % | SYSTOLIC BLOOD PRESSURE: 151 MMHG | DIASTOLIC BLOOD PRESSURE: 65 MMHG | BODY MASS INDEX: 25.77 KG/M2

## 2025-06-09 DIAGNOSIS — G89.29 CHRONIC BILATERAL LOW BACK PAIN WITHOUT SCIATICA: Primary | ICD-10-CM

## 2025-06-09 DIAGNOSIS — M54.50 CHRONIC BILATERAL LOW BACK PAIN WITHOUT SCIATICA: Primary | ICD-10-CM

## 2025-06-09 NOTE — PROGRESS NOTES
New patient:  Reason for visit: New patient presents to clinic complaining of lower back pain. He has seen Dr. Sloan in 2021. He has OA of bilateral hands. Patient states he cannot complete any activities due to pain. Patient has lower back pain with prolonged sitting and walking. Patient cannot straighten back quickly from sitting position. Patient cannot bend without having pain.     Dx: Hx of disc herniation  Referred: Self     Estimated time of onset:  Last year.    Numeric Rating Scale:      Pain at Present:  5/10                                                                                                                       Distribution of Pain:  Bilateral lower back       Past Treatments for Current Pain Condition:    Surgery: No   Physical Therapy: Yes but no relief.   Injections:  Hx of injections in spine in 2021 - 2 total   Medications: Ibuprofen 800 mg     Prior diagnostic testing related to this condition:  No recent imaging.

## 2025-06-09 NOTE — PROGRESS NOTES
[facility]        Center for [facility]      [address]  [address]  [place] [address]    PHONE  [phone]          FAX  [phone]    [email or web address]      OFFICE CONSULTATION          Dean Daily  : 1950   MRN: [id number]    PCP: Cristal Mckeon MD  Referring Provider: Kevin Clarke     Insurance: [facility] / [facility]: [facility] / Product Type: Medicare /            Date of Consult:  2025    Reason for Consultation:  Our patient has been referred to our office for evaluation of:  Chronic low back pain    History of Present Illness:  Dean Daily is a a(n) 74 year old, male with past medical history of chronic low back pain presents to my office with a 2-year history of worsening low back discomfort.  He has recently been performing physical therapy which has been of no benefit.  He has also previously had epidural steroid injections in the low back.  He saw Dr. Sloan in  and did not recommend any surgical intervention at that time.  Patient reports the pain is bilateral in the lumbar spine without any radiation into the lower extremities.  He denies numbness, tingling, weakness or bowel or bladder changes.  He utilizes ibuprofen 800 mg as needed when the pain is bad enough.  He reports positional changes causes low back pain to become worse.  He has no difficulty walking any type of distance due to the lower extremity pain or weakness but primarily because of the low back pain which limits him. Patient also dealing with bilateral hand pain.  He had previous surgical decompression of the Dupuytren's contracture.  Patient denies numbness or tingling in the bilateral hands or neck pain.        History:  Past Medical History[1]   Past Surgical History[2]  Family History[3]   Social History     Socioeconomic History    Marital status:      Spouse name: Not on file    Number of children: Not  on file    Years of education: Not on file    Highest education level: Not on file   Occupational History    Not on file   Tobacco Use    Smoking status: Never     Passive exposure: Never    Smokeless tobacco: Never   Vaping Use    Vaping status: Never Used   Substance and Sexual Activity    Alcohol use: Not Currently     Comment: rarely    Drug use: No    Sexual activity: Not on file   Other Topics Concern     Service Not Asked    Blood Transfusions Not Asked    Caffeine Concern Yes     Comment: Daily; one cup, coffee    Occupational Exposure Not Asked    Hobby Hazards Not Asked    Sleep Concern Not Asked    Stress Concern Not Asked    Weight Concern Not Asked    Special Diet Not Asked    Back Care Not Asked    Exercise Not Asked    Bike Helmet Not Asked    Seat Belt Not Asked    Self-Exams Not Asked    Grew up on a farm Not Asked    History of tanning Not Asked    Outdoor occupation Not Asked    Reaction to local anesthetic No    Pt has a pacemaker No    Pt has a defibrillator No   Social History Narrative    Not on file     Social Drivers of Health     Food Insecurity: Not on file   Transportation Needs: Not on file   Stress: Not on file   Housing Stability: Not on file        Allergies:  Allergies[4]    Medications:  Current Medications[5]     Review of Systems:  A 10-point system was reviewed.  Pertinent positives and negatives are noted in HPI.      Physical Exam:  /65 (BP Location: Left arm, Patient Position: Sitting, Cuff Size: adult)   Pulse 77   Ht 69\"   Wt 174 lb (78.9 kg)   SpO2 96%   BMI 25.70 kg/m²        Neurological Exam:    Motor Strength:  Strength in the bilateral lower extremities is 5 out of 5 and symmetrical    Sensation to light touch:  Sensation to light touch is intact in bilateral lower extremities throughout    DTRs:  2+ out of 4 in bilateral lower extremities patella and Achilles    Long tract signs:  Negative richmond  Negative babinski  Negative clonus      Abdomen:   Soft, non-distended, non-tender, with no rebound or guarding.  No peritoneal signs.     Extremities:  Non-tender, no lower extremity edema noted.      Labs:  CBC:  Lab Results   Component Value Date    WBC 7.4 02/18/2025    HGB 14.2 02/18/2025    HCT 41.1 02/18/2025    MCV 90.7 02/18/2025    .0 02/18/2025      BMG:   Lab Results   Component Value Date     02/18/2025    K 4.7 02/18/2025    CO2 29.0 02/18/2025     02/18/2025    BUN 17 02/18/2025      INR:   No results found for: \"INR\", \"PROTIME\"     Diagnostics:  I was able to review a report from an MRI from 2020 of the lumbar spine.  This showed evidence of multifactorial L3-4 spinal canal stenosis as well as L5-S1 moderate left foraminal stenosis.     Diagnosis:  1. Chronic bilateral low back pain without sciatica        Assessment/Plan:  Dean Daily is a a(n) 74 year old, male, with a longstanding history of chronic low back pain without lower extremity involvement.  Patient provided me with report of an MRI completed in 2020 which did show some multilevel degenerative findings as well as multifactorial canal stenosis at L3-4 as well as neuroforaminal stenosis at L5-S1.  Patient's pain has gotten significantly worse since that time.  I would like to update an MRI of the lumbar spine as well as complete flexion-extension x-rays to rule out occult instability.  Will then have patient follow-up in the office after imaging is completed for review.    All questions and concerns were addressed. We appreciate the opportunity to participate in the care of this patient. Please do not hesitate to call our office ([phone]) with any issues.     This report will be submitted to the referring provider.    This note has been dictated utilizing voice recognition software. Unfortunately, this may lead to occasional typographical errors. If there are any questions regarding this, please do not hesitate to contact our office.         Herber Rose,  EMIGDIO    2025  1:12 PM       [1]   Past Medical History:   Basal cell carcinoma    Right Lateral Leg    Blepharitis    Bilateral    Cataract     Bilateral    Essential hypertension    Herpes zoster without complication    High blood pressure    High cholesterol    Hyperlipidemia    Impacted cerumen of left ear    Meibomian gland dysfunction    Bilateral    Presbyopia    Bilateral    Pyogenic granuloma    Per next gen \"RLL\"    Vitreous floaters   [2]   Past Surgical History:  Procedure Laterality Date    Colonoscopy N/A 2017    Procedure: COLONOSCOPY;  Surgeon: Kevin Tompkins MD;  Location: [facility] ENDOSCOPY    Colonoscopy N/A 2022    Procedure: COLONOSCOPY;  Surgeon: Kevin Tompkins MD;  [facility]: [facility] ENDO    Ir epidural steriod injection      epidural steroid injection to back x 1    Other surgical history      Arthrocentesis of the right hip trochanteric bursa    Skin surgery      Leg, Skin cancer, Surgically removed- no othe treatment    [3]   Family History  Problem Relation Age of Onset    Hypertension Mother     Thyroid disease Mother     Heart Disease Mother 95            Cancer Sister         uterine cancer.    Other (Other) Father         good health    Diabetes Neg     Glaucoma Neg     Macular degeneration Neg    [4] No Known Allergies  [5]   Current Outpatient Medications   Medication Sig Dispense Refill    Tadalafil, PAH, 20 MG Oral Tab       ibuprofen 800 MG Oral Tab Take 1 tablet (800 mg total) by mouth every 8 (eight) hours as needed for Pain. 90 tablet 1    tamsulosin 0.4 MG Oral Cap Take 1 capsule (0.4 mg total) by mouth daily. Take 1/2 hour following the same meal each day 90 capsule 3    Tadalafil 20 MG Oral Tab Take 0.5-1 tablets (10-20 mg total) by mouth daily as needed for Erectile Dysfunction. 10 tablet 3    clotrimazole-betamethasone 1-0.05 % External Cream Apply 1 Application topically 2 (two) times daily as needed. 60 g 0    amLODIPine Besy-Benazepril HCl 10-40 MG  Oral Cap Take 1 capsule by mouth daily. 90 capsule 3    simvastatin 20 MG Oral Tab Take 1 tablet (20 mg total) by mouth nightly. 90 tablet 3    levocetirizine 5 MG Oral Tab Take 1 tablet (5 mg total) by mouth every evening. 30 tablet 0    hydrocortisone 2.5 % External Cream Apply 1 Application topically 2 (two) times daily. 28 g 1    Multiple Vitamins-Minerals (CENTRUM VITAMINTS OR) Take 1 tablet by mouth daily.

## 2025-06-10 ENCOUNTER — TELEPHONE (OUTPATIENT)
Dept: SURGERY | Facility: CLINIC | Age: 75
End: 2025-06-10

## 2025-06-10 NOTE — TELEPHONE ENCOUNTER
I spoke to mother who states pt is not vomiting, not having unconsolable crying, I advised that pt should return to ER to continue management. Mother states she will follow up with PMD Dr. Webb now. return precautions advised. Received Trimix medication, placed in fridge. Spoke with patient, assisted in scheduling teaching appointment. He confirmed and verbalized understanding.     Future Appointments   Date Time Provider Department Center   6/12/2025 10:00 AM Lesley Hoffmann APRN [facility]

## 2025-06-11 NOTE — PROGRESS NOTES
Spoke with mom and she confirmed 12/14/23 at 11:30 will work.    [facility] Urology  Follow Up Visit    HPI:   Dean Daily is a 74 year old male here today for follow-up. The patient was last seen on 06/06/2025.     Patient states his urinary symptoms are stable. The patient was previously started on Tamsulosin 0.4 mg at bedtime with good results.    The patient also tried Tadalafil 10-20 mg every day PRN. He felt the Tadalafil did not work, was not able to achieve an erection. He also failed management with Sildenafil. Currently using intraurethral gel without good results. Would like to discuss other options. States his wife is not interested in him using a LEIDA.    Presents today for Trimix teaching.    PSA history:      PSA Screen   Latest Ref Rng <=4.00 ng/mL   12/08/2012 1.7   10/26/2013 1.9   10/25/2014 1.7   11/07/2015 2.1   11/29/2016 2.3   06/09/2018 2.6   6/23/2020 2.54    3/13/2023 2.91    01/03/2025 3.3       Past Medical History:    Basal cell carcinoma    Right Lateral Leg    Blepharitis    Bilateral    Cataract     Bilateral    Essential hypertension    Herpes zoster without complication    High blood pressure    High cholesterol    Hyperlipidemia    Impacted cerumen of left ear    Meibomian gland dysfunction    Bilateral    Presbyopia    Bilateral    Pyogenic granuloma    Per next gen \"RLL\"    Vitreous floaters     Past Surgical History:   Procedure Laterality Date    Colonoscopy N/A 5/19/2017    Procedure: COLONOSCOPY;  Surgeon: Kevin Tompkins MD;  Location: [facility] ENDOSCOPY    Colonoscopy N/A 8/22/2022    Procedure: COLONOSCOPY;  Surgeon: Kevin Tompkins MD;  Location: [facility] ENDO    Ir epidural steriod injection  2009    epidural steroid injection to back x 1    Other surgical history      Arthrocentesis of the right hip trochanteric bursa    Skin surgery  2007    Leg, Skin cancer, Surgically removed- no othe treatment      Family History   Problem Relation Age of Onset    Hypertension Mother     Thyroid disease Mother     Heart Disease Mother 95             Cancer Sister         uterine cancer.    Other (Other) Father         good health    Diabetes Neg     Glaucoma Neg     Macular degeneration Neg      Social History     Socioeconomic History    Marital status:    Tobacco Use    Smoking status: Never     Passive exposure: Never    Smokeless tobacco: Never   Vaping Use    Vaping status: Never Used   Substance and Sexual Activity    Alcohol use: Not Currently     Comment: rarely    Drug use: No   Other Topics Concern    Caffeine Concern Yes     Comment: Daily; one cup, coffee    Reaction to local anesthetic No    Pt has a pacemaker No    Pt has a defibrillator No     Current Outpatient Medications   Medication Sig Dispense Refill    Tadalafil, PAH, 20 MG Oral Tab       ibuprofen 800 MG Oral Tab Take 1 tablet (800 mg total) by mouth every 8 (eight) hours as needed for Pain. 90 tablet 1    tamsulosin 0.4 MG Oral Cap Take 1 capsule (0.4 mg total) by mouth daily. Take 1/2 hour following the same meal each day 90 capsule 3    Tadalafil 20 MG Oral Tab Take 0.5-1 tablets (10-20 mg total) by mouth daily as needed for Erectile Dysfunction. 10 tablet 3    clotrimazole-betamethasone 1-0.05 % External Cream Apply 1 Application topically 2 (two) times daily as needed. 60 g 0    amLODIPine Besy-Benazepril HCl 10-40 MG Oral Cap Take 1 capsule by mouth daily. 90 capsule 3    simvastatin 20 MG Oral Tab Take 1 tablet (20 mg total) by mouth nightly. 90 tablet 3    levocetirizine 5 MG Oral Tab Take 1 tablet (5 mg total) by mouth every evening. 30 tablet 0    hydrocortisone 2.5 % External Cream Apply 1 Application topically 2 (two) times daily. 28 g 1    Multiple Vitamins-Minerals (CENTRUM VITAMINTS OR) Take 1 tablet by mouth daily.         Allergies: Patient has no known allergies.    REVIEW OF SYSTEMS:  Review of Systems   All other systems reviewed and are negative.        EXAM:  There were no vitals taken for this visit.    Physical Exam  Constitutional:       Appearance:  Normal appearance.   HENT:      Head: Normocephalic and atraumatic.      Mouth/Throat:      Mouth: Mucous membranes are moist.   Pulmonary:      Effort: Pulmonary effort is normal.   Abdominal:      General: Abdomen is flat.      Palpations: Abdomen is soft.   Skin:     General: Skin is warm and dry.   Neurological:      Mental Status: He is alert and oriented to person, place, and time.   Psychiatric:         Mood and Affect: Mood normal.         Thought Content: Thought content normal.          LABS:  Lab Results   Component Value Date    PSA 3.3 01/03/2025    TOTPSASCREEN 2.1 11/07/2015     Lab Results   Component Value Date    WBC 7.4 02/18/2025    RBC 4.53 02/18/2025    HGB 14.2 02/18/2025    HCT 41.1 02/18/2025    MCV 90.7 02/18/2025    MCH 31.3 02/18/2025    MCHC 34.5 02/18/2025    RDW 12.9 02/18/2025    .0 02/18/2025    MPV 8.2 06/09/2018     Lab Results   Component Value Date    GLU 94 02/18/2025    BUN 17 02/18/2025    BUNCREA 16.7 02/18/2025    CREATSERUM 1.02 02/18/2025    ANIONGAP 9 02/18/2025    GFRNAA 79 02/25/2022    GFRAA 92 02/25/2022    CA 9.5 02/18/2025     02/18/2025    K 4.7 02/18/2025     02/18/2025    CO2 29.0 02/18/2025     No results found for: \"PTP\", \"PT\", \"INR\"    IMAGING:  No results found.    IMPRESSION:  BPH with LUTS   Erectile Dysfunction    Potential risks of injection including priapism, injection site pain, and fibrosis were discussed.  Discussed seeking medication attention for an erection lasting longer than 2 hours.       Discussed relevant anatomy and physiology.  Discussed proper injection technique, frequency of injections, and time expected to respond after injection.     He was injected with 0.2 ml of Trimix standard solution obtained from [facility] as a test dose.  After about 15 minutes he reported 70% rigidity, no additional adverse effects.  I recommended he inject 0.2 ml and titrate up to 0.5 ml based on his response.  If he has any  additional questions or concerns he will return to the clinic.  He was agreeable.          PLAN:  - Continue Tamsulosin 0.4 mg at bedtime  - Prescription for Trimix sent to pharmacy    CLOVIS Head  06/12/2025

## 2025-06-12 ENCOUNTER — OFFICE VISIT (OUTPATIENT)
Dept: SURGERY | Facility: CLINIC | Age: 75
End: 2025-06-12

## 2025-06-12 ENCOUNTER — TELEPHONE (OUTPATIENT)
Dept: SURGERY | Facility: CLINIC | Age: 75
End: 2025-06-12

## 2025-06-12 DIAGNOSIS — N52.9 ERECTILE DYSFUNCTION, UNSPECIFIED ERECTILE DYSFUNCTION TYPE: Primary | ICD-10-CM

## 2025-06-12 PROCEDURE — 99213 OFFICE O/P EST LOW 20 MIN: CPT

## 2025-06-12 PROCEDURE — 1160F RVW MEDS BY RX/DR IN RCRD: CPT

## 2025-06-12 PROCEDURE — 1159F MED LIST DOCD IN RCRD: CPT

## 2025-06-12 NOTE — PATIENT INSTRUCTIONS
Scrub the top of the vial with an alcohol swab. Draw up 0.2 mL into the syringe with the larger needle. Replace it with the smaller needle. Clean the area you are going to be injecting with an alcohol swab and then inject as seen below.    If response is not adequate, you can increase the dose the next time you use it to 0.25 mL (the line prison between 2 and 3 on the syringe). You can increase by 0.05 mL each time until 0.5 mL, which is the max dosage.    If you have an erection lasting more than 2 hours, you should go to the emergency room.

## 2025-07-27 DIAGNOSIS — M48.061 SPINAL STENOSIS OF LUMBAR REGION, UNSPECIFIED WHETHER NEUROGENIC CLAUDICATION PRESENT: ICD-10-CM

## 2025-07-27 DIAGNOSIS — Z00.00 ENCOUNTER FOR ANNUAL HEALTH EXAMINATION: ICD-10-CM

## 2025-07-27 RX ORDER — IBUPROFEN 800 MG/1
800 TABLET, FILM COATED ORAL EVERY 8 HOURS PRN
Qty: 90 TABLET | Refills: 1 | OUTPATIENT
Start: 2025-07-27

## 2025-08-15 ENCOUNTER — HOSPITAL ENCOUNTER (OUTPATIENT)
Dept: GENERAL RADIOLOGY | Age: 75
Discharge: HOME | End: 2025-08-15

## 2025-08-15 ENCOUNTER — HOSPITAL ENCOUNTER (OUTPATIENT)
Dept: MRI IMAGING | Age: 75
Discharge: HOME | End: 2025-08-15

## 2025-08-15 DIAGNOSIS — G89.29 CHRONIC BILATERAL LOW BACK PAIN WITHOUT SCIATICA: ICD-10-CM

## 2025-08-15 DIAGNOSIS — M54.50 CHRONIC BILATERAL LOW BACK PAIN WITHOUT SCIATICA: ICD-10-CM

## 2025-08-15 PROCEDURE — 72110 X-RAY EXAM L-2 SPINE 4/>VWS: CPT

## 2025-08-15 PROCEDURE — 72148 MRI LUMBAR SPINE W/O DYE: CPT

## 2025-08-19 RX ORDER — CLOTRIMAZOLE AND BETAMETHASONE DIPROPIONATE 10; .64 MG/G; MG/G
1 CREAM TOPICAL 2 TIMES DAILY PRN
Qty: 60 G | Refills: 0 | Status: SHIPPED | OUTPATIENT
Start: 2025-08-19

## 2025-08-22 ENCOUNTER — PATIENT OUTREACH (OUTPATIENT)
Dept: CASE MANAGEMENT | Age: 75
End: 2025-08-22

## 2025-08-26 DIAGNOSIS — E78.00 HIGH CHOLESTEROL: ICD-10-CM

## 2025-08-29 RX ORDER — SIMVASTATIN 20 MG
20 TABLET ORAL NIGHTLY
Qty: 90 TABLET | Refills: 3 | Status: SHIPPED | OUTPATIENT
Start: 2025-08-29

## (undated) DEVICE — LINE MNTR ADLT SET O2 INTMD

## (undated) DEVICE — MEDI-VAC NON-CONDUCTIVE SUCTION TUBING 6MM X 1.8M (6FT.) L: Brand: CARDINAL HEALTH

## (undated) DEVICE — Device: Brand: DEFENDO AIR/WATER/SUCTION AND BIOPSY VALVE

## (undated) DEVICE — KIT ENDO ORCAPOD 160/180/190

## (undated) DEVICE — ENDOSCOPY PACK - LOWER: Brand: MEDLINE INDUSTRIES, INC.

## (undated) DEVICE — FORCEP RADIAL JAW 4

## (undated) DEVICE — KIT CLEAN ENDOKIT 1.1OZ GOWNX2

## (undated) NOTE — LETTER
AUTHORIZATION FOR SURGICAL OPERATION OR OTHER PROCEDURE    1.  I hereby [facility]. Gurinder Hernandez, [facility] staff assigned to my case to perform the following operation and/or procedure at the [facility]:    ____________________________________ Time:  ________ A. M.  P.M.        Patient Name:  ______________________________________________________  (please print)      Patient signature:  ___________________________________________________             Relationship to Patient:

## (undated) NOTE — LETTER
05/20/21    Judi Saldivar  [address] [place] [address]  [id number] [facility] [address]      Dear Darrius Cartagena records indicate that you have outstanding lab work and or testing that was ordered for you and has not yet been completed:  Orders Placed This [address] [facility]

## (undated) NOTE — LETTER
6/9/2025    Dean Daily  [address] [facility] [address]         Estimado/a Shena Moran enviamos esta carta para informarle que nuestra oficina cerda intentado ponerse en contacto con usted por teléfono sin éxito. El motivo de la llamada era para informarle sobre idalia referencia.   Por favor, comuníquese con nuestra oficina llamando al número ubicado debajo para hablar sobre marti michelle y para realizar los cambios necesarios a grace información de contacto en nuestro sistema. Ammon por grace ayuda.    Sinceramente,    Cristal Mckeon MD  [address] [place] [address]  Ph: [phone]  Fax: [phone]         Document electronically generated by:  Reina POLANCO RN

## (undated) NOTE — MR AVS SNAPSHOT
[facility]  [address] [address]  [phone]               Thank you for choosing us for your health care visit with Tati Albert. Rah Young MD.  We are glad to serve you and happy to provide you with this summary of your visit. - Nifedipine Ointmnet 0.2% in [employer]ibase base            MyChart     Call the [employer] for assistance with your inactive [employer] account    If you have questions, you can call [phone] to talk to our [facility] Staff.  Remember, [employer] is NOT to

## (undated) NOTE — LETTER
January 28, 2019         Robby Martel DO  [address] [address]      Patient: Barrett Sanchez   YOB: 1950   Date of Visit: 1/28/2019       Dear Dr. Shirley Navarro,    I saw your patient, Barrett Sanchez, on 1/2

## (undated) NOTE — LETTER
4/1/2022    Goldie Diallo        [address] [place], [place] [address]e [facility]            Dear Goldie Diallo,      Our records indicate that you are due for an appointment for a Colonoscopy in May 2022, or sometime there after, with Marcus Morfin MD.    Please call our office to schedule this appointment. Your medical well-being is important to us. If your insurance requires a referral, please call your primary care office to request one.       Thank you,      The Physicians and Staff at [facility]

## (undated) NOTE — IP AVS SNAPSHOT
[facility] HOSP - [facility]    [address], [place] ~ [phone]                Discharge Summary   5/19/2017    Ghassan Rodriguez           Admission Information        Provider Department    5/19/2017 Oj Ghosh [address]  Elham Ervin MD [facility] Endo - Your referring physician will receive a full report of your examination.  - If you do not hear from your doctor's office within two weeks of your biopsy, please call them for your results.     Discharge References/Attachments     DIVERTICULOSIS AND DIVERT Call the [facility] for assistance with your inactive [employer] account    If you have questions, you can call [phone] to talk to our [facility] Staff. Remember, [employer] is NOT to be used for urgent needs. For medical emergencies, dial 911.     Vi

## (undated) NOTE — LETTER
AUTHORIZATION FOR SURGICAL OPERATION OR OTHER PROCEDURE    1.  I hereby authorize Dr. Paz Deal, and [facility] staff assigned to my case to perform the following operation and/or procedure at the [facility]:    ____________________________________ Time:  ________ A. M.  P.M.        Patient Name:  ______________________________________________________  (please print)      Patient signature:  ___________________________________________________             Relationship to Patient:

## (undated) NOTE — LETTER
2023      No Recipients     Patient: Madhuri Vazquez   YOB: 1950   Date of Visit: 2023       Dear Dr. Dara Schultz Recipients: Thank you for referring Madhuri Vazquez to me for evaluation. Here is my assessment and plan of care:    Madhuri Vazquez is a 67year old male. HPI:     HPI    NP. Pt in today for a complete eye exam. Pt's last eye exam was about a year ago at [facility] and was prescribed glasses (forgot to bring them today). Pt denies any surgeries done to the eyes. Pt is pre-diabetic- diet controlled. Last HA1C was 5.6 on 3/13/23    Consult: per CLOVIS Gomez   Last edited by Ene Wang OT on 2023  2:23 PM.        Patient History:  Past Medical History:   Diagnosis Date    Basal cell carcinoma 07/15/2008    Right Lateral Leg    Blepharitis 1998    Bilateral    Cataract      Bilateral    Essential hypertension     Herpes zoster without complication     High blood pressure     High cholesterol     Hyperlipidemia     Meibomian gland dysfunction     Bilateral    Presbyopia 1998    Bilateral    Pyogenic granuloma 1998    Per next gen \"RLL\"    Vitreous floaters        Surgical History: Madhuri Vazquez has a past surgical history that includes ir epidural steriod injection () (epidural steroid injection to back x 1); skin surgery () (Leg, Skin cancer, Surgically removed- no othe treatment ); other surgical history (Arthrocentesis of the right hip trochanteric bursa); colonoscopy (N/A, 2017) (Procedure: COLONOSCOPY;  Surgeon: Hemanth Miller MD;  Location: [facility] ENDOSCOPY); and colonoscopy (N/A, 2022) (Procedure: COLONOSCOPY;  Surgeon: Hemanth Miller MD;  Location: [facility]). Family History   Problem Relation Age of Onset    Hypertension Mother     Thyroid disease Mother     Heart Disease Mother 80            Cancer Sister         uterine cancer.     Other (Other) Father         good health    Diabetes Neg     Glaucoma Neg     Macular degeneration Neg        Social History:   Social History     Socioeconomic History    Marital status:    Tobacco Use    Smoking status: Never    Smokeless tobacco: Never   Vaping Use    Vaping Use: Never used   Substance and Sexual Activity    Alcohol use: Yes     Alcohol/week: 0.0 standard drinks of alcohol     Comment: rarely    Drug use: No   Other Topics Concern    Caffeine Concern Yes     Comment: Daily; one cup, coffee    Reaction to local anesthetic No       Medications:  Current Outpatient Medications   Medication Sig Dispense Refill    benzonatate 200 MG Oral Cap Take 1 capsule (200 mg total) by mouth 3 (three) times daily as needed for cough. 30 capsule 0    montelukast (SINGULAIR) 10 MG Oral Tab Take 1 tablet (10 mg total) by mouth daily. 90 tablet 0    amLODIPine Besy-Benazepril HCl 10-40 MG Oral Cap Take 1 capsule by mouth daily. 90 capsule 3    simvastatin 20 MG Oral Tab Take 1 tablet (20 mg total) by mouth nightly. 90 tablet 3    ibuprofen 800 MG Oral Tab Take 1 tablet (800 mg total) by mouth every 8 (eight) hours as needed for Pain. 90 tablet 1    lidocaine 4 % External Patch Place 1 patch onto the skin daily. 15 patch 0    lidocaine 5 % External Patch Place 1 patch onto the skin daily. 15 patch 0    levocetirizine 5 MG Oral Tab Take 1 tablet (5 mg total) by mouth every evening. 30 tablet 0    carbamide peroxide 6.5 % Otic Solution Place 5 drops into the left ear 2 (two) times daily. 15 mL 0    diazePAM (VALIUM) 2 MG Oral Tab Take 1 tablet (2 mg total) by mouth nightly as needed for Anxiety or Sleep. 10 tablet 0    Lidocaine HCl 0.5 % External Gel Apply 1 Application topically daily. 237 g 0    hydrocortisone 2.5 % External Cream Apply 1 Application topically 2 (two) times daily. 28 g 1    clotrimazole-betamethasone 1-0.05 % External Cream Apply 1 Application topically 2 (two) times daily.  45 g 0    cyanocobalamine 100 MCG Oral Tab Take 2.5 tablets (250 mcg total) by mouth daily. Multiple Vitamins-Minerals (CENTRUM VITAMINTS OR) Take 1 tablet by mouth daily.          Allergies:  No Known Allergies    ROS:     ROS    Positive for: Eyes  Negative for: Constitutional, Gastrointestinal, Neurological, Skin, Genitourinary, Musculoskeletal, HENT, Endocrine, Cardiovascular, Respiratory, Psychiatric, Allergic/Imm, Heme/Lymph  Last edited by Jessi Silver OT on 9/20/2023  2:03 PM.          PHYSICAL EXAM:     Base Eye Exam       Visual Acuity (Snellen - Linear)         Right Left    Dist sc 20/40 +1 20/25 +2    Dist ph sc 20/20     Near sc 20/50- 20/40-              Tonometry (Icare, 2:47 PM)         Right Left    Pressure 17 17              Pupils         Pupils    Right PERRL    Left PERRL              Visual Fields         Left Right     Full Full              Extraocular Movement         Right Left     Full, Ortho Full, Ortho              Dilation       Both eyes: 1.0% Mydriacyl and 2.5% Reinaldo Synephrine @ 2:47 PM              Dilation #2       Both eyes: 1.0% Mydriacyl and 2.5% Reinaldo Synephrine @ 2:50 PM                  Slit Lamp and Fundus Exam       Slit Lamp Exam         Right Left    Lids/Lashes Dermatochalasis, Meibomian gland dysfunction Dermatochalasis, Meibomian gland dysfunction    Conjunctiva/Sclera Nasal/temp pinguecula Nasal pinguecula    Cornea Clear 2 old scars at 5 o'clock    Anterior Chamber Deep and quiet Deep and quiet    Iris Normal Normal    Lens 1+ Nuclear sclerosis 1+ Nuclear sclerosis    Vitreous Vitreous floaters Vitreous floaters              Fundus Exam         Right Left    Disc Good rim, Temporal crescent Good rim, Temporal crescent    C/D Ratio 0.35 0.35    Macula Normal Normal    Vessels Normal Normal    Periphery Normal Normal                  Refraction       Wearing Rx       Type: Forgot glasses              Manifest Refraction (Auto)         Sphere Cylinder [place] Dist [place] Add Near [place]    Right +1.25 +0.50 145       Left +0.75 +0.25 155 Manifest Refraction #2         Sphere Cylinder [place] Dist VA Add Near [place]    Right +1.25 Sphere  20/20 +2.50 20/20    Left +1.00 Sphere  20/20 +2.50 20/20              Final Rx         Sphere Cylinder Dist VA Near VA    Right +1.25 Sphere 20/20     Left +1.00 Sphere 20/20       Type: Distance only              Final Rx #2         Sphere Cylinder Dist VA Near VA    Right +3.75 Sphere  20/20    Left +3.50 Sphere  20/20      Type: Reading only                     ASSESSMENT/PLAN:     Diagnoses and Plan:     Age-related nuclear cataract of both eyes  Discussed early cataracts with patient. No treatment recommended at this time. New glasses Rx given today for distance only and reading only    Will see patient in 1 year for a complete exam.      Vitreous floaters of both eyes   There is no evidence of retinal pathology. All signs and symptoms of retinal detachment/tears explained in detail. Patient instructed to call the office if they experience increase in floaters, increase in flashes of light, loss of vision or curtain or veil effect. No orders of the defined types were placed in this encounter. Meds This Visit:  Requested Prescriptions      No prescriptions requested or ordered in this encounter        Follow up instructions:  Return in about 1 year (around 9/20/2024) for complete exam.    9/20/2023  Scribed by: Brandon Lou MD        If you have questions, please do not hesitate to call me. I look forward to following Jade Campbell along with you.     Sincerely,        Brandon Lou MD        CC:   No Recipients    Document electronically generated by: Brandon Lou MD

## (undated) NOTE — LETTER
12/15/20        Daija Leon  [address] [place] [address]  [id number] [facility] [id number]      Dear Harry Young records indicate that you have outstanding lab work and or testing that was ordered for you and has not yet been completed:  Orders Placed This Encounter      Wai